# Patient Record
Sex: MALE | Race: WHITE | NOT HISPANIC OR LATINO | Employment: OTHER | URBAN - METROPOLITAN AREA
[De-identification: names, ages, dates, MRNs, and addresses within clinical notes are randomized per-mention and may not be internally consistent; named-entity substitution may affect disease eponyms.]

---

## 2017-01-02 ENCOUNTER — MYC REFILL (OUTPATIENT)
Dept: FAMILY MEDICINE | Facility: CLINIC | Age: 67
End: 2017-01-02

## 2017-01-02 DIAGNOSIS — I10 ESSENTIAL HYPERTENSION WITH GOAL BLOOD PRESSURE LESS THAN 140/90: ICD-10-CM

## 2017-01-02 DIAGNOSIS — I10 BENIGN ESSENTIAL HYPERTENSION: ICD-10-CM

## 2017-01-02 DIAGNOSIS — E78.5 HYPERLIPIDEMIA LDL GOAL <130: ICD-10-CM

## 2017-01-02 RX ORDER — AMLODIPINE BESYLATE 10 MG/1
10 TABLET ORAL DAILY
Qty: 90 TABLET | Refills: 1 | Status: SHIPPED | OUTPATIENT
Start: 2017-01-02 | End: 2017-08-20

## 2017-01-02 RX ORDER — TRIAMTERENE AND HYDROCHLOROTHIAZIDE 37.5; 25 MG/1; MG/1
1 CAPSULE ORAL DAILY
Qty: 90 CAPSULE | Refills: 1 | Status: SHIPPED | OUTPATIENT
Start: 2017-01-02 | End: 2017-06-29

## 2017-01-02 RX ORDER — SIMVASTATIN 40 MG
40 TABLET ORAL AT BEDTIME
Qty: 90 TABLET | Refills: 1 | Status: SHIPPED | OUTPATIENT
Start: 2017-01-02 | End: 2017-06-29

## 2017-01-02 RX ORDER — VALSARTAN 160 MG/1
160 TABLET ORAL DAILY
Qty: 90 TABLET | Refills: 1 | Status: SHIPPED | OUTPATIENT
Start: 2017-01-02 | End: 2017-06-29

## 2017-01-02 NOTE — TELEPHONE ENCOUNTER
Change of pharmacy.  Sent scripts to [facility] at patient's request.   Prescriptions approved per [facility] Refill Protocol.  Candis Mccormick RN

## 2017-01-02 NOTE — TELEPHONE ENCOUNTER
Message from MyChart:  Original authorizing provider: Angus Llanes MD, MD    Yoan Baltazar would like a refill of the following medications:  valsartan (DIOVAN) 160 MG tablet [Angus Llanes MD, MD]  amLODIPine (NORVASC) 10 MG tablet [Angus Llanes MD, MD]  triamterene-hydrochlorothiazide (DYAZIDE) 37.5-25 MG per capsule [Angus Llanes MD, MD]  simvastatin (ZOCOR) 40 MG tablet [Angus Llanes MD, MD]    Preferred pharmacy: [facility] - [place] [address] [facility]    Comment:  Refill through [facility] mail order ([phone])

## 2017-01-04 ENCOUNTER — TRANSFERRED RECORDS (OUTPATIENT)
Dept: HEALTH INFORMATION MANAGEMENT | Facility: CLINIC | Age: 67
End: 2017-01-04

## 2017-01-25 DIAGNOSIS — Z47.1 AFTERCARE FOLLOWING RIGHT KNEE JOINT REPLACEMENT SURGERY: Primary | ICD-10-CM

## 2017-01-25 DIAGNOSIS — Z96.651 AFTERCARE FOLLOWING RIGHT KNEE JOINT REPLACEMENT SURGERY: Primary | ICD-10-CM

## 2017-01-30 ENCOUNTER — OFFICE VISIT (OUTPATIENT)
Dept: ORTHOPEDICS | Facility: CLINIC | Age: 67
End: 2017-01-30

## 2017-01-30 ENCOUNTER — OFFICE VISIT (OUTPATIENT)
Dept: FAMILY MEDICINE | Facility: CLINIC | Age: 67
End: 2017-01-30
Payer: COMMERCIAL

## 2017-01-30 ENCOUNTER — RADIANT APPOINTMENT (OUTPATIENT)
Dept: GENERAL RADIOLOGY | Facility: CLINIC | Age: 67
End: 2017-01-30
Attending: INTERNAL MEDICINE
Payer: COMMERCIAL

## 2017-01-30 VITALS
HEART RATE: 72 BPM | BODY MASS INDEX: 29.47 KG/M2 | WEIGHT: 199 LBS | OXYGEN SATURATION: 97 % | DIASTOLIC BLOOD PRESSURE: 86 MMHG | TEMPERATURE: 97.4 F | SYSTOLIC BLOOD PRESSURE: 138 MMHG | HEIGHT: 69 IN

## 2017-01-30 DIAGNOSIS — R05.9 COUGH: Primary | ICD-10-CM

## 2017-01-30 DIAGNOSIS — J45.20 REACTIVE AIRWAY DISEASE, MILD INTERMITTENT, UNCOMPLICATED: ICD-10-CM

## 2017-01-30 DIAGNOSIS — R05.9 COUGH: ICD-10-CM

## 2017-01-30 DIAGNOSIS — M17.0 PRIMARY OSTEOARTHRITIS OF BOTH KNEES: Primary | ICD-10-CM

## 2017-01-30 DIAGNOSIS — I10 BENIGN ESSENTIAL HYPERTENSION: ICD-10-CM

## 2017-01-30 LAB
ANION GAP SERPL CALCULATED.3IONS-SCNC: 8 MMOL/L (ref 3–14)
BUN SERPL-MCNC: 19 MG/DL (ref 7–30)
CALCIUM SERPL-MCNC: 9.3 MG/DL (ref 8.5–10.1)
CHLORIDE SERPL-SCNC: 108 MMOL/L (ref 94–109)
CO2 SERPL-SCNC: 27 MMOL/L (ref 20–32)
CREAT SERPL-MCNC: 1.15 MG/DL (ref 0.66–1.25)
GFR SERPL CREATININE-BSD FRML MDRD: 64 ML/MIN/1.7M2
GLUCOSE SERPL-MCNC: 116 MG/DL (ref 70–99)
POTASSIUM SERPL-SCNC: 3.7 MMOL/L (ref 3.4–5.3)
SODIUM SERPL-SCNC: 143 MMOL/L (ref 133–144)

## 2017-01-30 PROCEDURE — 80048 BASIC METABOLIC PNL TOTAL CA: CPT | Performed by: INTERNAL MEDICINE

## 2017-01-30 PROCEDURE — 71020 XR CHEST 2 VW: CPT

## 2017-01-30 PROCEDURE — 36415 COLL VENOUS BLD VENIPUNCTURE: CPT | Performed by: INTERNAL MEDICINE

## 2017-01-30 PROCEDURE — 99213 OFFICE O/P EST LOW 20 MIN: CPT | Mod: 25 | Performed by: INTERNAL MEDICINE

## 2017-01-30 PROCEDURE — 90670 PCV13 VACCINE IM: CPT | Performed by: INTERNAL MEDICINE

## 2017-01-30 PROCEDURE — 90471 IMMUNIZATION ADMIN: CPT | Performed by: INTERNAL MEDICINE

## 2017-01-30 RX ORDER — PREDNISONE 20 MG/1
20 TABLET ORAL DAILY
Qty: 5 TABLET | Refills: 0 | Status: SHIPPED | OUTPATIENT
Start: 2017-01-30 | End: 2018-05-18

## 2017-01-30 RX ORDER — ALBUTEROL SULFATE 90 UG/1
2 AEROSOL, METERED RESPIRATORY (INHALATION) EVERY 6 HOURS PRN
Qty: 1 INHALER | Refills: 1 | Status: SHIPPED | OUTPATIENT
Start: 2017-01-30 | End: 2018-05-18

## 2017-01-30 RX ORDER — PREDNISONE 20 MG/1
20 TABLET ORAL 2 TIMES DAILY
Qty: 10 TABLET | Refills: 0 | Status: SHIPPED | OUTPATIENT
Start: 2017-01-30 | End: 2017-01-30

## 2017-01-30 NOTE — MR AVS SNAPSHOT
After Visit Summary   1/30/2017    Yoan Baltazar    MRN: [id number]           Patient Information     Date Of Birth          1950        Visit Information        Provider Department      1/30/2017 9:30 AM Angus Llanes MD [facility]        Today's Diagnoses     Cough    -  1     Reactive airway disease, mild intermittent, uncomplicated         Benign essential hypertension           Care Instructions    (R05) Cough  (primary encounter diagnosis)  Comment: chest x-ray today ordered.  I suspect that you have a viral respiratory infection and this should clear in time.  We will treat acute reactive airway disease with a burst of prednisone and also a trial of albuterol as needed.  OK for Prevnar today and recommend pnuemovax in 6 weeks.   Hold off on zoster vaccine until cough has resolved.  This can be given at any pharmacy.  Plan: XR Chest 2 Views, predniSONE (DELTASONE) 20 MG         tablet, PNEUMOCOCCAL CONJ VACCINE 13 VALENT IM         (PREVNAR 13)            (J45.20) Reactive airway disease, mild intermittent, uncomplicated  Comment: as above   Plan: albuterol (PROAIR HFA/PROVENTIL HFA/VENTOLIN         HFA) 108 (90 BASE) MCG/ACT Inhaler,         PNEUMOCOCCAL CONJ VACCINE 13 VALENT IM (PREVNAR        13)            (I10) Benign essential hypertension  Comment: check basic metabolic panel today   Plan: Basic metabolic panel                    Follow-ups after your visit        Your next 10 appointments already scheduled     Jan 30, 2017  3:30 PM   XR SIX FOOT STANDING EXTREMITIES with UCORTHXR1   [facility] ([facility])    [address] [place] [address] [phone]           Please bring a list of your current medicines to your exam. (Include vitamins, minerals and over-thecounter medicines.) Leave your valuables at home.  Tell your doctor if there is a chance you may be pregnant.  You do not need to do  anything special for this exam.            Jan 30, 2017  3:40 PM   XR KNEE BILATERAL 1/2 VIEWS with [id number]   [facility] ([facility])    [address] [id number] [phone]           Please bring a list of your current medicines to your exam. (Include vitamins, minerals and over-thecounter medicines.) Leave your valuables at home.  Tell your doctor if there is a chance you may be pregnant.  You do not need to do anything special for this exam.            Jan 30, 2017  3:45 PM   (Arrive by 3:30 PM)   RETURN KNEE with Yoan Oates MD   [facility] ([facility])    [address] [address] [phone]              Future tests that were ordered for you today     Open Future Orders        Priority Expected Expires Ordered    XR Chest 2 Views Routine 1/30/2017 1/30/2018 1/30/2017            Who to contact     If you have questions or need follow up information about today's clinic visit or your schedule please contact [facility] directly at [phone].  Normal or non-critical lab and imaging results will be communicated to you by MyChart, letter or phone within 4 business days after the clinic has received the results. If you do not hear from us within 7 days, please contact the clinic through [facility] or phone. If you have a critical or abnormal lab result, we will notify you by phone as soon as possible.  Submit refill requests through [email or web address] or call your pharmacy and they will forward the refill request to us. Please allow 3 business days for your refill to be completed.          Additional Information About Your Visit        [facility] Information     [email or web address] gives you secure access to your electronic health record. If you see a primary care provider, you can also send messages to your care team and make appointments. If you have questions,  "please call your primary care clinic.  If you do not have a primary care provider, please call [phone] and they will assist you.        Care EveryWhere ID     This is your Care EveryWhere ID. This could be used by other organizations to access your [place] medical records  [id number]        Your Vitals Were     Pulse Temperature Height BMI (Body Mass Index) Pulse Oximetry       72 97.4  F (36.3  C) (Tympanic) 5' 9\" (1.753 m) 29.37 kg/m2 97%        Blood Pressure from Last 3 Encounters:   01/30/17 138/86   08/12/16 138/86   08/14/15 138/82    Weight from Last 3 Encounters:   01/30/17 199 lb (90.266 kg)   08/12/16 200 lb (90.719 kg)   08/14/15 197 lb (89.359 kg)              We Performed the Following     Basic metabolic panel     PNEUMOCOCCAL CONJ VACCINE 13 VALENT IM (PREVNAR 13)          Today's Medication Changes          These changes are accurate as of: 1/30/17 10:08 AM.  If you have any questions, ask your nurse or doctor.               Start taking these medicines.        Dose/Directions    albuterol 108 (90 BASE) MCG/ACT Inhaler   Commonly known as:  PROAIR HFA/PROVENTIL HFA/VENTOLIN HFA   Used for:  Reactive airway disease, mild intermittent, uncomplicated   Started by:  Angus Llanes MD        Dose:  2 puff   Inhale 2 puffs into the lungs every 6 hours as needed for shortness of breath / dyspnea or wheezing   Quantity:  1 Inhaler   Refills:  1       predniSONE 20 MG tablet   Commonly known as:  DELTASONE   Used for:  Cough   Started by:  Angus Llanes MD        Dose:  20 mg   Take 1 tablet (20 mg) by mouth 2 times daily   Quantity:  10 tablet   Refills:  0            Where to get your medicines      These medications were sent to [facility] - [place], [place] - [address] [facility]  [address] [facility] [address]     Phone:  [phone]    - albuterol 108 (90 BASE) MCG/ACT Inhaler  - predniSONE 20 MG tablet             Primary Care Provider " Office Phone # Fax #    Angus Llanes [phone]       [facility] [address] [facility] [address]  [facility] [address]        Thank you!     Thank you for choosing [facility]  for your care. Our goal is always to provide you with excellent care. Hearing back from our patients is one way we can continue to improve our services. Please take a few minutes to complete the written survey that you may receive in the mail after your visit with us. Thank you!             Your Updated Medication List - Protect others around you: Learn how to safely use, store and throw away your medicines at www.disposemymeds.org.          This list is accurate as of: 1/30/17 10:08 AM.  Always use your most recent med list.                   Brand Name Dispense Instructions for use    albuterol 108 (90 BASE) MCG/ACT Inhaler    PROAIR HFA/PROVENTIL HFA/VENTOLIN HFA    1 Inhaler    Inhale 2 puffs into the lungs every 6 hours as needed for shortness of breath / dyspnea or wheezing       amLODIPine 10 MG tablet    NORVASC    90 tablet    Take 1 tablet (10 mg) by mouth daily       aspirin 81 MG tablet     30 tablet    Take 1 tablet (81 mg) by mouth daily       DAILY MULTIVITAMIN PO      Take 1 tablet by mouth daily.       GLUCOSAMINE CHONDR COMPLEX PO      Take 1 capsule by mouth 2 times daily. 1500 mg       omeprazole 20 MG tablet     90 tablet    Take 1 tablet (20 mg) by mouth daily Take 30-60 minutes before a meal.       predniSONE 20 MG tablet    DELTASONE    10 tablet    Take 1 tablet (20 mg) by mouth 2 times daily       simvastatin 40 MG tablet    ZOCOR    90 tablet    Take 1 tablet (40 mg) by mouth At Bedtime       tadalafil 10 MG tablet    CIALIS    12 tablet    Take 0.5-1 tablets (5-10 mg) by mouth daily as needed for erectile dysfunction Never use with nitroglycerin, terazosin or doxazosin.       triamterene-hydrochlorothiazide 37.5-25 MG per capsule    DYAZIDE    90 capsule    Take 1 capsule by  mouth daily       valsartan 160 MG tablet    DIOVAN    90 tablet    Take 1 tablet (160 mg) by mouth daily       VITAMIN B COMPLEX PO      Take 1 tablet by mouth daily.

## 2017-01-30 NOTE — PATIENT INSTRUCTIONS
(R05) Cough  (primary encounter diagnosis)  Comment: chest x-ray today ordered.  I suspect that you have a viral respiratory infection and this should clear in time.  We will treat acute reactive airway disease with a burst of prednisone and also a trial of albuterol as needed.  OK for Prevnar today and recommend pnuemovax in 6 weeks.   Hold off on zoster vaccine until cough has resolved.  This can be given at any pharmacy.  Plan: XR Chest 2 Views, predniSONE (DELTASONE) 20 MG         tablet, PNEUMOCOCCAL CONJ VACCINE 13 VALENT IM         (PREVNAR 13)            (J45.20) Reactive airway disease, mild intermittent, uncomplicated  Comment: as above   Plan: albuterol (PROAIR HFA/PROVENTIL HFA/VENTOLIN         HFA) 108 (90 BASE) MCG/ACT Inhaler,         PNEUMOCOCCAL CONJ VACCINE 13 VALENT IM (PREVNAR        13)            (I10) Benign essential hypertension  Comment: check basic metabolic panel today   Plan: Basic metabolic panel

## 2017-01-30 NOTE — LETTER
2017       RE: Carlos Baltazar  [address] [facility] [address]     Dear Colleague,    Thank you for referring your patient, Carlos Baltazar, to the [facility] at [facility]. Please see a copy of my visit note below.    HISTORY OF PRESENT ILLNESS:  Carlos is a year and a half status post right total knee arthroplasty with right ACL retaining design from [employer].  He reports marked improvement of the calf and hamstring discomfort but does note when he arises after seated for a long period of time, he gets some temporary symptoms behind the knee.  On the left side he notes a painless crepitus with deep knee flexion.  He is otherwise very pleased with his bilateral knee arthroplasties.      PHYSICAL EXAMINATION:  He is alert and oriented x3.  His affect is normal.  He walks with a normal gait pattern.  He has 0-120 degrees of flexion on the right and 0-130 on the left.  He has very slight valgus on the left side.  Standing AP x-ray of both lower extremities shows 3 degrees of valgus on the right and 0 degrees on the left.      ASSESSMENT:  Good progress following bilateral total knee arthroplasties.      TREATMENT:  He will progress his activities and continue his exercises and return for followup in 3 years or sooner if necessary.         CARLOS WASHBURN MD          D: 2017 17:19   T: 2017 16:57   MT: FRANKLYN      Name:     CARLOS BALTAZAR   MRN:      [id number]        Account:      [id number]   :      1950           Service Date: 2017      Document: [id number]

## 2017-01-30 NOTE — MR AVS SNAPSHOT
After Visit Summary   1/30/2017    Yoan Baltazar    MRN: [id number]           Patient Information     Date Of Birth          1950        Visit Information        Provider Department      1/30/2017 3:45 PM Yoan Oates MD [facility]        Today's Diagnoses     Primary osteoarthritis of both knees    -  1       Follow-ups after your visit        Who to contact     Please call your clinic at [phone] to:    Ask questions about your health    Make or cancel appointments    Discuss your medicines    Learn about your test results    Speak to your doctor   If you have compliments or concerns about an experience at your clinic, or if you wish to file a complaint, please contact [facility] Patient Relations at [phone] or email us at [email or web address]         Additional Information About Your Visit        MyChart Information     [employer] gives you secure access to your electronic health record. If you see a primary care provider, you can also send messages to your care team and make appointments. If you have questions, please call your primary care clinic.  If you do not have a primary care provider, please call [phone] and they will assist you.      [facility] is an electronic gateway that provides easy, online access to your medical records. With [facility], you can request a clinic appointment, read your test results, renew a prescription or communicate with your care team.     To access your existing account, please contact your [facility] or call [phone] for assistance.        Care EveryWhere ID     This is your Care EveryWhere ID. This could be used by other organizations to access your [place] medical records  [id number]         Blood Pressure from Last 3 Encounters:   01/30/17 138/86   08/12/16 138/86   08/14/15 138/82    Weight from Last 3 Encounters:   01/30/17 90.3 kg (199 lb)   08/12/16  90.7 kg (200 lb)   08/14/15 89.4 kg (197 lb)              Today, you had the following     No orders found for display         Today's Medication Changes          These changes are accurate as of: 1/30/17 11:59 PM.  If you have any questions, ask your nurse or doctor.               Start taking these medicines.        Dose/Directions    albuterol 108 (90 BASE) MCG/ACT Inhaler   Commonly known as:  PROAIR HFA/PROVENTIL HFA/VENTOLIN HFA   Used for:  Reactive airway disease, mild intermittent, uncomplicated   Started by:  Angus Llanes MD        Dose:  2 puff   Inhale 2 puffs into the lungs every 6 hours as needed for shortness of breath / dyspnea or wheezing   Quantity:  1 Inhaler   Refills:  1       predniSONE 20 MG tablet   Commonly known as:  DELTASONE   Used for:  Cough   Started by:  Angus Llanes MD        Dose:  20 mg   Take 1 tablet (20 mg) by mouth daily   Quantity:  5 tablet   Refills:  0            Where to get your medicines      These medications were sent to [facility] [address] [place] [address], [place] [address]  [address] [place] [address], [facility] [address]     Phone:  [phone]     predniSONE 20 MG tablet         [facility] [place] - [address] [place] [address] A  [address] [facility] [address]     Phone:  [phone]     albuterol 108 (90 BASE) MCG/ACT Inhaler                Primary Care Provider Office Phone # Fax #    Angus Llanes [phone]       [facility] [address] [address]        Thank you!     Thank you for choosing [facility]  for your care. Our goal is always to provide you with excellent care. Hearing back from our patients is one way we can continue to improve our services. Please take a few minutes to complete the written survey that you may receive in the mail after your visit with us. Thank  you!             Your Updated Medication List - Protect others around you: Learn how to safely use, store and throw away your medicines at www.disposemymeds.org.          This list is accurate as of: 1/30/17 11:59 PM.  Always use your most recent med list.                   Brand Name Dispense Instructions for use    albuterol 108 (90 BASE) MCG/ACT Inhaler    PROAIR HFA/PROVENTIL HFA/VENTOLIN HFA    1 Inhaler    Inhale 2 puffs into the lungs every 6 hours as needed for shortness of breath / dyspnea or wheezing       amLODIPine 10 MG tablet    NORVASC    90 tablet    Take 1 tablet (10 mg) by mouth daily       aspirin 81 MG tablet     30 tablet    Take 1 tablet (81 mg) by mouth daily       DAILY MULTIVITAMIN PO      Take 1 tablet by mouth daily.       GLUCOSAMINE CHONDR COMPLEX PO      Take 1 capsule by mouth 2 times daily. 1500 mg       omeprazole 20 MG tablet     90 tablet    Take 1 tablet (20 mg) by mouth daily Take 30-60 minutes before a meal.       predniSONE 20 MG tablet    DELTASONE    5 tablet    Take 1 tablet (20 mg) by mouth daily       simvastatin 40 MG tablet    ZOCOR    90 tablet    Take 1 tablet (40 mg) by mouth At Bedtime       tadalafil 10 MG tablet    CIALIS    12 tablet    Take 0.5-1 tablets (5-10 mg) by mouth daily as needed for erectile dysfunction Never use with nitroglycerin, terazosin or doxazosin.       triamterene-hydrochlorothiazide 37.5-25 MG per capsule    DYAZIDE    90 capsule    Take 1 capsule by mouth daily       valsartan 160 MG tablet    DIOVAN    90 tablet    Take 1 tablet (160 mg) by mouth daily       VITAMIN B COMPLEX PO      Take 1 tablet by mouth daily.

## 2017-01-30 NOTE — PROGRESS NOTES
"Chief Complaint   Patient presents with     Cough       Initial /86 mmHg  Pulse 72  Temp(Src) 97.4  F (36.3  C) (Tympanic)  Ht 5' 9\" (1.753 m)  Wt 199 lb (90.266 kg)  BMI 29.37 kg/m2  SpO2 97% Estimated body mass index is 29.37 kg/(m^2) as calculated from the following:    Height as of this encounter: 5' 9\" (1.753 m).    Weight as of this encounter: 199 lb (90.266 kg).  BP completed using cuff size: regular right arm   Debo Gardner- CMA    [facility]  CLINIC PROGRESS NOTE    Subjective:  Upper respiratory infection    Yoan Baltazar presents to the clinic today for a month long cough.  Has had some slight improvement.  He is noticing that the cough is present most days - may be triggered by food.  He is taking omeprazole 20 mg daily.  He is not taking lisinopril.  No sick contacts.  No fevers or chills.  Cough is more dry - non productive.  He notes that has had cough periodically in the early 2000's.      Past medical history, medications, allergies, social history, family history reviewed and updated in [facility] as of 1/30/2017 .    ROS  CONSTITUTIONAL: no fatigue, no unexpected change in weight  SKIN: no worrisome rashes, no worrisome moles, no worrisome lesions  EYES: no acute vision problems or changes  ENT: no ear problems, no mouth problems, no throat problems  RESP: as above   CV: no chest pain, no palpitations, no new or worsening peripheral edema  GI: no nausea, no vomiting, no constipation, no diarrhea  : no frequency, no dysuria, no hematuria  MS: no claudication, no myalgias, no joint aches  PSYCHIATRIC: no changes in mood or affect      Objective:  Vitals  /86 mmHg  Pulse 72  Temp(Src) 97.4  F (36.3  C) (Tympanic)  Ht 5' 9\" (1.753 m)  Wt 199 lb (90.266 kg)  BMI 29.37 kg/m2  SpO2 97%  GEN: Alert Oriented x3 NAD  HEENT: Atraumatic, normocephalic, neck supple, no thyromegaly, negative cervical adenopathy  TM: TM bilaterally pearly and grey with normal light reflex  CV: RRR " no murmurs or rubs  PULM: CTA no wheezes or crackles  ABD: Soft, nontender nondistended, no hepatosplenomegally  SKIN: No visible skin lesion or ulcerations  EXT:  , no edema bilateral lower extremities  NEURO: Gait and station deferred, No focal neurologic deficits  PSYCH: Mood good, affect mood congruent    No results found for this or any previous visit (from the past 24 hour(s)).    Assessment/Plan:  Patient Instructions   (R05) Cough  (primary encounter diagnosis)  Comment: chest x-ray today ordered.  I suspect that you have a viral respiratory infection and this should clear in time.  We will treat acute reactive airway disease with a burst of prednisone and also a trial of albuterol as needed.  OK for Prevnar today and recommend pnuemovax in 6 weeks.   Hold off on zoster vaccine until cough has resolved.  This can be given at any pharmacy.  Plan: XR Chest 2 Views, predniSONE (DELTASONE) 20 MG         tablet, PNEUMOCOCCAL CONJ VACCINE 13 VALENT IM         (PREVNAR 13)            (J45.20) Reactive airway disease, mild intermittent, uncomplicated  Comment: as above   Plan: albuterol (PROAIR HFA/PROVENTIL HFA/VENTOLIN         HFA) 108 (90 BASE) MCG/ACT Inhaler,         PNEUMOCOCCAL CONJ VACCINE 13 VALENT IM (PREVNAR        13)            (I10) Benign essential hypertension  Comment: check basic metabolic panel today   Plan: Basic metabolic panel                  Follow up in 2-4 weeks if not improving.    Disclaimer: This note consists of symbols derived from keyboarding, dictation and/or voice recognition software. As a result, there may be errors in the script that have gone undetected. Please consider this when interpreting information found in this chart.    Angus Llanes MD  [phone]

## 2017-01-30 NOTE — PROGRESS NOTES
Quick Note:    Conor Milton,    I have had the opportunity to review your recent results and an interpretation is as follows:  Your chest x-ray was clear - no acute infiltrate was seen     Sincerely,  Angus Llanes MD  ______

## 2017-01-31 NOTE — PROGRESS NOTES
HISTORY OF PRESENT ILLNESS:  Carlos is a year and a half status post right total knee arthroplasty with right ACL retaining design from [employer].  He reports marked improvement of the calf and hamstring discomfort but does note when he arises after seated for a long period of time, he gets some temporary symptoms behind the knee.  On the left side he notes a painless crepitus with deep knee flexion.  He is otherwise very pleased with his bilateral knee arthroplasties.      PHYSICAL EXAMINATION:  He is alert and oriented x3.  His affect is normal.  He walks with a normal gait pattern.  He has 0-120 degrees of flexion on the right and 0-130 on the left.  He has very slight valgus on the left side.  Standing AP x-ray of both lower extremities shows 3 degrees of valgus on the right and 0 degrees on the left.      ASSESSMENT:  Good progress following bilateral total knee arthroplasties.      TREATMENT:  He will progress his activities and continue his exercises and return for followup in 3 years or sooner if necessary.         CARLOS WASHBURN MD             D: 2017 17:19   T: 2017 16:57   MT: FRANKLYN      Name:     CARLOS STEVENSON   MRN:      [id number]        Account:      [id number]   :      1950           Service Date: 2017      Document: [id number]

## 2017-02-03 NOTE — PROGRESS NOTES
Quick Note:    Conor Milton,    I have had the opportunity to review your recent results and an interpretation is as follows:  Your follow up basic metabolic panel shows stable renal function and electrolytes     Sincerely,  Angus Llanes MD  ______

## 2017-03-02 ENCOUNTER — TELEPHONE (OUTPATIENT)
Dept: FAMILY MEDICINE | Facility: CLINIC | Age: 67
End: 2017-03-02

## 2017-03-02 DIAGNOSIS — E78.5 HYPERLIPIDEMIA LDL GOAL <130: Primary | ICD-10-CM

## 2017-03-10 ENCOUNTER — ALLIED HEALTH/NURSE VISIT (OUTPATIENT)
Dept: NURSING | Facility: CLINIC | Age: 67
End: 2017-03-10
Payer: COMMERCIAL

## 2017-03-10 DIAGNOSIS — Z11.59 NEED FOR HEPATITIS C SCREENING TEST: ICD-10-CM

## 2017-03-10 DIAGNOSIS — Z23 NEED FOR VACCINATION: Primary | ICD-10-CM

## 2017-03-10 DIAGNOSIS — E78.5 HYPERLIPIDEMIA LDL GOAL <130: ICD-10-CM

## 2017-03-10 DIAGNOSIS — I10 ESSENTIAL HYPERTENSION WITH GOAL BLOOD PRESSURE LESS THAN 140/90: ICD-10-CM

## 2017-03-10 LAB
CHOLEST SERPL-MCNC: 159 MG/DL
HCV AB SERPL QL IA: NORMAL
HDLC SERPL-MCNC: 36 MG/DL
LDLC SERPL CALC-MCNC: 87 MG/DL
NONHDLC SERPL-MCNC: 123 MG/DL
POTASSIUM SERPL-SCNC: 3.7 MMOL/L (ref 3.4–5.3)
TRIGL SERPL-MCNC: 178 MG/DL

## 2017-03-10 PROCEDURE — 36415 COLL VENOUS BLD VENIPUNCTURE: CPT | Performed by: INTERNAL MEDICINE

## 2017-03-10 PROCEDURE — 90471 IMMUNIZATION ADMIN: CPT

## 2017-03-10 PROCEDURE — 84132 ASSAY OF SERUM POTASSIUM: CPT | Performed by: INTERNAL MEDICINE

## 2017-03-10 PROCEDURE — 86803 HEPATITIS C AB TEST: CPT | Performed by: INTERNAL MEDICINE

## 2017-03-10 PROCEDURE — 99207 ZZC NO CHARGE NURSE ONLY: CPT | Mod: 25

## 2017-03-10 PROCEDURE — 80061 LIPID PANEL: CPT | Performed by: INTERNAL MEDICINE

## 2017-03-10 PROCEDURE — 90736 HZV VACCINE LIVE SUBQ: CPT

## 2017-03-10 NOTE — MR AVS SNAPSHOT
After Visit Summary   3/10/2017    Yoan Baltazar    MRN: [id number]           Patient Information     Date Of Birth          1950        Visit Information        Provider Department      3/10/2017 9:30 AM CS NURSE [facility]        Today's Diagnoses     Need for vaccination    -  1       Follow-ups after your visit        Your next 10 appointments already scheduled     Mar 10, 2017  9:15 AM CST   LAB with CS LAB   [facility]    [address] [id number] [phone]           Patient must bring picture ID.  Patient should be prepared to give a urine specimen  Please do not eat 10-12 hours before your appointment if you are coming in fasting for labs on lipids, cholesterol, or glucose (sugar).  Pregnant women should follow their Care Team instructions. Water with medications is okay. Do not drink coffee or other fluids.   If you have concerns about taking  your medications, please ask at office or if scheduling via Ostrov[facility], send a message by clicking on Secure Messaging, Message Your Care Team.            Mar 10, 2017  9:30 AM CST   Nurse Only with CS NURSE   [facility]    [address] [facility] [id number] [phone]              Who to contact     If you have questions or need follow up information about today's clinic visit or your schedule please contact [facility] directly at [phone].  Normal or non-critical lab and imaging results will be communicated to you by MyChart, letter or phone within 4 business days after the clinic has received the results. If you do not hear from us within 7 days, please contact the clinic through Beakerhart or phone. If you have a critical or abnormal lab result, we will notify you by phone as soon as possible.  Submit refill requests through Ostrovok or call your pharmacy and they will forward the refill request to us. Please allow 3  business days for your refill to be completed.          Additional Information About Your Visit        Twirl TVhart Information     M[employer] gives you secure access to your electronic health record. If you see a primary care provider, you can also send messages to your care team and make appointments. If you have questions, please call your primary care clinic.  If you do not have a primary care provider, please call [phone] and they will assist you.        Care EveryWhere ID     This is your Care EveryWhere ID. This could be used by other organizations to access your [place] medical records  [id number]         Blood Pressure from Last 3 Encounters:   01/30/17 138/86   08/12/16 138/86   08/14/15 138/82    Weight from Last 3 Encounters:   01/30/17 199 lb (90.3 kg)   08/12/16 200 lb (90.7 kg)   08/14/15 197 lb (89.4 kg)              We Performed the Following     1st  Administration  [[id number]]     ZOSTER VACC LIVE SUBQ NJX [[id number]]        Primary Care Provider Office Phone # Fax #    Angus Llanes [phone]       [facility] [address]  [place] [address]        Thank you!     Thank you for choosing [facility]  for your care. Our goal is always to provide you with excellent care. Hearing back from our patients is one way we can continue to improve our services. Please take a few minutes to complete the written survey that you may receive in the mail after your visit with us. Thank you!             Your Updated Medication List - Protect others around you: Learn how to safely use, store and throw away your medicines at www.disposemymeds.org.          This list is accurate as of: 3/10/17  9:10 AM.  Always use your most recent med list.                   Brand Name Dispense Instructions for use    albuterol 108 (90 BASE) MCG/ACT Inhaler    PROAIR HFA/PROVENTIL HFA/VENTOLIN HFA    1 Inhaler    Inhale 2 puffs into the lungs every 6 hours as needed for shortness  of breath / dyspnea or wheezing       amLODIPine 10 MG tablet    NORVASC    90 tablet    Take 1 tablet (10 mg) by mouth daily       aspirin 81 MG tablet     30 tablet    Take 1 tablet (81 mg) by mouth daily       DAILY MULTIVITAMIN PO      Take 1 tablet by mouth daily.       GLUCOSAMINE CHONDR COMPLEX PO      Take 1 capsule by mouth 2 times daily. 1500 mg       omeprazole 20 MG tablet     90 tablet    Take 1 tablet (20 mg) by mouth daily Take 30-60 minutes before a meal.       predniSONE 20 MG tablet    DELTASONE    5 tablet    Take 1 tablet (20 mg) by mouth daily       simvastatin 40 MG tablet    ZOCOR    90 tablet    Take 1 tablet (40 mg) by mouth At Bedtime       tadalafil 10 MG tablet    CIALIS    12 tablet    Take 0.5-1 tablets (5-10 mg) by mouth daily as needed for erectile dysfunction Never use with nitroglycerin, terazosin or doxazosin.       triamterene-hydrochlorothiazide 37.5-25 MG per capsule    DYAZIDE    90 capsule    Take 1 capsule by mouth daily       valsartan 160 MG tablet    DIOVAN    90 tablet    Take 1 tablet (160 mg) by mouth daily       VITAMIN B COMPLEX PO      Take 1 tablet by mouth daily.

## 2017-03-10 NOTE — NURSING NOTE
Screening Questionnaire for Adult Immunization    Are you sick today?   No   Do you have allergies to medications, food, a vaccine component or latex?   No   Have you ever had a serious reaction after receiving a vaccination?   No   Do you have a long-term health problem with heart disease, lung disease, asthma, kidney disease, metabolic disease (e.g. diabetes), anemia, or other blood disorder?   No   Do you have cancer, leukemia, HIV/AIDS, or any other immune system problem?   No   In the past 3 months, have you taken medications that affect  your immune system, such as prednisone, other steroids, or anticancer drugs; drugs for the treatment of rheumatoid arthritis, Crohn s disease, or psoriasis; or have you had radiation treatments?   No   Have you had a seizure, or a brain or other nervous system problem?   No   During the past year, have you received a transfusion of blood or blood     products, or been given immune (gamma) globulin or antiviral drug?   No   For women: Are you pregnant or is there a chance you could become        pregnant during the next month?   No   Have you received any vaccinations in the past 4 weeks?   No     Immunization questionnaire answers were all negative.      MNVFC doesn't apply on this patient    Per orders of Dr. Llanes, injection of zostavax given by Nakia Ramos. Patient instructed to remain in clinic for 20 minutes afterwards, and to report any adverse reaction to me immediately.       Screening performed by Nakia Ramos on 3/10/2017 at 9:10 AM.

## 2017-03-12 NOTE — PROGRESS NOTES
Conor Milton,    I had the opportunity to review your recent labs and a summary of your labs reads as follows:    Your potassium level returned back to within normal limits  Your hepatitis C level returned negative  Your fasting lipid panel show  - low HDL (good) cholesterol -as your goal is greater than 40  - low LDL (bad) cholesterol as your goal is less than 130  - elevated triglyceride levels      Sincerely,  Angus Llanes MD

## 2017-06-06 ENCOUNTER — TRANSFERRED RECORDS (OUTPATIENT)
Dept: HEALTH INFORMATION MANAGEMENT | Facility: CLINIC | Age: 67
End: 2017-06-06

## 2017-06-29 DIAGNOSIS — I10 ESSENTIAL HYPERTENSION WITH GOAL BLOOD PRESSURE LESS THAN 140/90: ICD-10-CM

## 2017-06-29 DIAGNOSIS — I10 BENIGN ESSENTIAL HYPERTENSION: ICD-10-CM

## 2017-06-29 DIAGNOSIS — E78.5 HYPERLIPIDEMIA LDL GOAL <130: ICD-10-CM

## 2017-06-29 RX ORDER — SIMVASTATIN 40 MG
40 TABLET ORAL AT BEDTIME
Qty: 90 TABLET | Refills: 1 | Status: SHIPPED | OUTPATIENT
Start: 2017-06-29 | End: 2017-12-31

## 2017-06-29 RX ORDER — VALSARTAN 160 MG/1
160 TABLET ORAL DAILY
Qty: 90 TABLET | Refills: 1 | Status: SHIPPED | OUTPATIENT
Start: 2017-06-29 | End: 2017-12-31

## 2017-06-29 RX ORDER — TRIAMTERENE AND HYDROCHLOROTHIAZIDE 37.5; 25 MG/1; MG/1
1 CAPSULE ORAL DAILY
Qty: 90 CAPSULE | Refills: 1 | Status: SHIPPED | OUTPATIENT
Start: 2017-06-29 | End: 2017-11-12

## 2017-08-20 DIAGNOSIS — I10 ESSENTIAL HYPERTENSION WITH GOAL BLOOD PRESSURE LESS THAN 140/90: ICD-10-CM

## 2017-08-21 NOTE — TELEPHONE ENCOUNTER
amLODIPine (NORVASC) 10 MG tablet        Last Written Prescription Date: 1/2/2017  Last Fill Quantity: 90, # refills: 1  Last Office Visit with G, [facility] or [facility] prescribing provider: 1/30/2017       Potassium   Date Value Ref Range Status   03/10/2017 3.7 3.4 - 5.3 mmol/L Final     Creatinine   Date Value Ref Range Status   01/30/2017 1.15 0.66 - 1.25 mg/dL Final     BP Readings from Last 3 Encounters:   01/30/17 138/86   08/12/16 138/86   08/14/15 138/82

## 2017-08-22 RX ORDER — AMLODIPINE BESYLATE 10 MG/1
TABLET ORAL
Qty: 90 TABLET | Refills: 1 | Status: SHIPPED | OUTPATIENT
Start: 2017-08-22 | End: 2018-03-04

## 2017-08-22 NOTE — TELEPHONE ENCOUNTER
Prescription approved per [facility] Refill Protocol.    Sue Fernández RN   [facility]

## 2017-10-20 ENCOUNTER — DOCUMENTATION ONLY (OUTPATIENT)
Dept: LAB | Facility: CLINIC | Age: 67
End: 2017-10-20

## 2017-10-20 DIAGNOSIS — Z00.00 ROUTINE GENERAL MEDICAL EXAMINATION AT A HEALTH CARE FACILITY: Primary | ICD-10-CM

## 2017-10-24 DIAGNOSIS — Z00.00 ROUTINE GENERAL MEDICAL EXAMINATION AT A HEALTH CARE FACILITY: ICD-10-CM

## 2017-10-24 LAB
ALBUMIN SERPL-MCNC: 4 G/DL (ref 3.4–5)
ALP SERPL-CCNC: 94 U/L (ref 40–150)
ALT SERPL W P-5'-P-CCNC: 32 U/L (ref 0–70)
ANION GAP SERPL CALCULATED.3IONS-SCNC: 10 MMOL/L (ref 3–14)
AST SERPL W P-5'-P-CCNC: 17 U/L (ref 0–45)
BILIRUB SERPL-MCNC: 0.4 MG/DL (ref 0.2–1.3)
BUN SERPL-MCNC: 17 MG/DL (ref 7–30)
CALCIUM SERPL-MCNC: 9.1 MG/DL (ref 8.5–10.1)
CHLORIDE SERPL-SCNC: 108 MMOL/L (ref 94–109)
CHOLEST SERPL-MCNC: 139 MG/DL
CO2 SERPL-SCNC: 24 MMOL/L (ref 20–32)
CREAT SERPL-MCNC: 1.27 MG/DL (ref 0.66–1.25)
ERYTHROCYTE [DISTWIDTH] IN BLOOD BY AUTOMATED COUNT: 13.9 % (ref 10–15)
GFR SERPL CREATININE-BSD FRML MDRD: 57 ML/MIN/1.7M2
GLUCOSE SERPL-MCNC: 108 MG/DL (ref 70–99)
HCT VFR BLD AUTO: 42.9 % (ref 40–53)
HDLC SERPL-MCNC: 37 MG/DL
HGB BLD-MCNC: 15 G/DL (ref 13.3–17.7)
LDLC SERPL CALC-MCNC: 68 MG/DL
MCH RBC QN AUTO: 30.4 PG (ref 26.5–33)
MCHC RBC AUTO-ENTMCNC: 35 G/DL (ref 31.5–36.5)
MCV RBC AUTO: 87 FL (ref 78–100)
NONHDLC SERPL-MCNC: 102 MG/DL
PLATELET # BLD AUTO: 244 10E9/L (ref 150–450)
POTASSIUM SERPL-SCNC: 3.7 MMOL/L (ref 3.4–5.3)
PROT SERPL-MCNC: 7.5 G/DL (ref 6.8–8.8)
RBC # BLD AUTO: 4.94 10E12/L (ref 4.4–5.9)
SODIUM SERPL-SCNC: 142 MMOL/L (ref 133–144)
TRIGL SERPL-MCNC: 170 MG/DL
WBC # BLD AUTO: 5.8 10E9/L (ref 4–11)

## 2017-10-24 PROCEDURE — 36415 COLL VENOUS BLD VENIPUNCTURE: CPT | Performed by: NURSE PRACTITIONER

## 2017-10-24 PROCEDURE — 85027 COMPLETE CBC AUTOMATED: CPT | Performed by: NURSE PRACTITIONER

## 2017-10-24 PROCEDURE — 80061 LIPID PANEL: CPT | Performed by: NURSE PRACTITIONER

## 2017-10-24 PROCEDURE — 80053 COMPREHEN METABOLIC PANEL: CPT | Performed by: NURSE PRACTITIONER

## 2017-10-30 ENCOUNTER — OFFICE VISIT (OUTPATIENT)
Dept: FAMILY MEDICINE | Facility: CLINIC | Age: 67
End: 2017-10-30
Payer: COMMERCIAL

## 2017-10-30 VITALS
SYSTOLIC BLOOD PRESSURE: 132 MMHG | DIASTOLIC BLOOD PRESSURE: 78 MMHG | HEART RATE: 72 BPM | HEIGHT: 69 IN | TEMPERATURE: 97.3 F | WEIGHT: 202 LBS | OXYGEN SATURATION: 97 % | BODY MASS INDEX: 29.92 KG/M2

## 2017-10-30 DIAGNOSIS — Z23 NEED FOR VACCINATION: ICD-10-CM

## 2017-10-30 DIAGNOSIS — Z91.81 AT RISK FOR FALLING: ICD-10-CM

## 2017-10-30 DIAGNOSIS — K22.70 BARRETT'S ESOPHAGUS WITHOUT DYSPLASIA: ICD-10-CM

## 2017-10-30 DIAGNOSIS — K21.9 GASTROESOPHAGEAL REFLUX DISEASE WITHOUT ESOPHAGITIS: ICD-10-CM

## 2017-10-30 DIAGNOSIS — Z80.0 FAMILY HISTORY OF PANCREATIC CANCER: ICD-10-CM

## 2017-10-30 DIAGNOSIS — I10 ESSENTIAL HYPERTENSION: ICD-10-CM

## 2017-10-30 DIAGNOSIS — Z23 NEED FOR PROPHYLACTIC VACCINATION AND INOCULATION AGAINST INFLUENZA: ICD-10-CM

## 2017-10-30 DIAGNOSIS — Z00.00 ROUTINE GENERAL MEDICAL EXAMINATION AT A HEALTH CARE FACILITY: Primary | ICD-10-CM

## 2017-10-30 DIAGNOSIS — N18.30 CKD (CHRONIC KIDNEY DISEASE) STAGE 3, GFR 30-59 ML/MIN (H): ICD-10-CM

## 2017-10-30 PROCEDURE — G0439 PPPS, SUBSEQ VISIT: HCPCS | Performed by: INTERNAL MEDICINE

## 2017-10-30 PROCEDURE — G0008 ADMIN INFLUENZA VIRUS VAC: HCPCS | Performed by: INTERNAL MEDICINE

## 2017-10-30 PROCEDURE — G0009 ADMIN PNEUMOCOCCAL VACCINE: HCPCS | Performed by: INTERNAL MEDICINE

## 2017-10-30 PROCEDURE — 90662 IIV NO PRSV INCREASED AG IM: CPT | Performed by: INTERNAL MEDICINE

## 2017-10-30 PROCEDURE — 90732 PPSV23 VACC 2 YRS+ SUBQ/IM: CPT | Performed by: INTERNAL MEDICINE

## 2017-10-30 NOTE — NURSING NOTE
Screening Questionnaire for Adult Immunization    Are you sick today?   No   Do you have allergies to medications, food, a vaccine component or latex?   No   Have you ever had a serious reaction after receiving a vaccination?   No   Do you have a long-term health problem with heart disease, lung disease, asthma, kidney disease, metabolic disease (e.g. diabetes), anemia, or other blood disorder?   No   Do you have cancer, leukemia, HIV/AIDS, or any other immune system problem?   No   In the past 3 months, have you taken medications that affect  your immune system, such as prednisone, other steroids, or anticancer drugs; drugs for the treatment of rheumatoid arthritis, Crohn s disease, or psoriasis; or have you had radiation treatments?   No   Have you had a seizure, or a brain or other nervous system problem?   No   During the past year, have you received a transfusion of blood or blood     products, or been given immune (gamma) globulin or antiviral drug?   No   For women: Are you pregnant or is there a chance you could become        pregnant during the next month?   No   Have you received any vaccinations in the past 4 weeks?   No     Immunization questionnaire answers were all negative.        Per orders of Dr. Llanes, injection of Pneumovax 23 given by Arleen Gruber. Patient instructed to remain in clinic for 15 minutes afterwards, and to report any adverse reaction to me immediately.  Prior to injection verified patient identity using patient's name and date of birth.       Screening performed by Arleen Gruber on 10/30/2017 at 10:42 AM.

## 2017-10-30 NOTE — PROGRESS NOTES
SUBJECTIVE:   Yoan Baltazar is a 66 year old male who presents for Preventive Visit.      Are you in the first 12 months of your Medicare Part B coverage?  Maybe   Vision    L: 20/20  R: 20/25  B:20/20    Healthy Habits:    Do you get at least three servings of calcium containing foods daily (dairy, green leafy vegetables, etc.)? yes    Amount of exercise or daily activities, outside of work: 4-5 day(s) per week    Problems taking medications regularly No    Medication side effects: No    Have you had an eye exam in the past two years? yes    Do you see a dentist twice per year? yes    Do you have sleep apnea, excessive snoring or daytime drowsiness?no    COGNITIVE SCREEN  1) Repeat 3 items (Banana, Sunrise, Chair)    2) Clock draw: NORMAL  3) 3 item recall: Recalls 3 objects  Results: 3 items recalled: COGNITIVE IMPAIRMENT LESS LIKELY    Mini-CogTM Copyright S Yfn. Licensed by the author for use in [facility]; reprinted with permission (zofia@[facility]). All rights reserved.      Low back pain    Has been drinking more fluids and stretching regularly and strengthening.  Occasionally will take ibuprofen twice per day for low back pain and playing more tennis.    Chronic kidney disease   Noted recent elevation of creatinine.      Reviewed and updated as needed this visit by clinical staff         Reviewed and updated as needed this visit by Provider      Social History   Substance Use Topics     Smoking status: Never Smoker     Smokeless tobacco: Never Used     Alcohol use 0.0 oz/week     0 Standard drinks or equivalent per week      Comment: maybe 1 glass of wine a month       The patient does not drink >3 drinks per day nor >7 drinks per week.    Today's PHQ-2 Score:   PHQ-2 ( 1999 Pfizer) 8/12/2016 3/3/2015   Q1: Little interest or pleasure in doing things 0 0   Q2: Feeling down, depressed or hopeless 0 0   PHQ-2 Score 0 0         Do you feel safe in your environment - Yes    Do you have a Health  "Care Directive?: No: Advance care planning reviewed with patient; information given to patient to review.      Current providers sharing in care for this patient include: Patient Care Team:  Angus Llanes MD as PCP - General (Internal Medicine)  Yoan Oates MD as MD (Orthopedics)      Hearing impairment: Yes, possible wax right ear    Ability to successfully perform activities of daily living: Yes, no assistance needed     Fall risk:  Fallen 2 or more times in the past year?: No  Any fall with injury in the past year?: No    Home safety:  none identified careful with ladder & stairs        The following health maintenance items are reviewed in Epic and correct as of today:  Health Maintenance   Topic Date Due     FALL RISK ASSESSMENT  12/03/2016     AORTIC ANEURYSM SCREENING (SYSTEM ASSIGNED)  12/03/2016     ADVANCE DIRECTIVE PLANNING Q5 YRS  07/30/2017     INFLUENZA VACCINE (SYSTEM ASSIGNED)  09/01/2017     PNEUMOCOCCAL (2 of 2 - PPSV23) 01/31/2018     BMP Q6 MOS  04/24/2018     LIPID MONITORING Q6 MO  04/24/2018     CMP Q1 YR  10/24/2018     CBC Q1 YR  10/24/2018     TETANUS IMMUNIZATION (SYSTEM ASSIGNED)  08/29/2022     COLON CANCER SCREEN (SYSTEM ASSIGNED)  12/01/2025     HEPATITIS C SCREENING  Completed     ROS:  Constitutional, HEENT, cardiovascular, pulmonary, GI, , musculoskeletal, neuro, skin, endocrine and psych systems are negative, except as otherwise noted.      OBJECTIVE:   /78 (Cuff Size: Adult Large)  Pulse 72  Temp 97.3  F (36.3  C) (Oral)  Ht 5' 9\" (1.753 m)  Wt 202 lb (91.6 kg)  SpO2 97%  BMI 29.83 kg/m2 Estimated body mass index is 29.83 kg/(m^2) as calculated from the following:    Height as of this encounter: 5' 9\" (1.753 m).    Weight as of this encounter: 202 lb (91.6 kg).  EXAM:   GENERAL: healthy, alert and no distress  EYES: Eyes grossly normal to inspection, PERRL and conjunctivae and sclerae normal  HENT: ear canals and TM's normal, nose and mouth without " ulcers or lesions  NECK: no adenopathy, no asymmetry, masses, or scars and thyroid normal to palpation  RESP: lungs clear to auscultation - no rales, rhonchi or wheezes  CV: regular rate and rhythm, normal S1 S2, no S3 or S4, no murmur, click or rub, no peripheral edema and peripheral pulses strong  ABDOMEN: soft, nontender, no hepatosplenomegaly, no masses and bowel sounds normal  MS: no gross musculoskeletal defects noted, no edema  SKIN: no suspicious lesions or rashes  NEURO: Normal strength and tone, mentation intact and speech normal  PSYCH: mentation appears normal, affect normal/bright    ASSESSMENT / PLAN:   (Z00.00) Routine general medical examination at a health care facility  (primary encounter diagnosis)  Comment: For routine exam, we reviewed labs as ordered, cholesterol, diabetes mellitus check, liver function, renal function, PSA.  We will also update vaccination history.  Plan:     (Z23) Need for prophylactic vaccination and inoculation against influenza  Comment: flu shot given  Plan:     (I10) Essential hypertension  Comment: blood pressure is stable on current blood pressure medications   Plan: US Renal Complete, FLU VACCINE, INCREASED         ANTIGEN, PRESV FREE            (N18.3) CKD (chronic kidney disease) stage 3, GFR 30-59 ml/min  Comment: We will request a renal ultrasound -[facility] phone #[phone] and continue current blood pressure medications  I would advise holding ibuprofen and all NSAIDs, but treat pain with tylenol as needed   Plan: US Renal Complete, FLU VACCINE, INCREASED         ANTIGEN, PRESV FREE            (K21.9) Gastroesophageal reflux disease without esophagitis  Comment: OK to use omeprazole daily  Plan:     (K22.70) Boogie's esophagus without dysplasia  Comment: as above - follow up upper endoscopy in January  Plan:            End of Life Planning:  Patient currently has an advanced directive: Yes.  Practitioner is supportive of  "decision.    COUNSELING:        Estimated body mass index is 29.39 kg/(m^2) as calculated from the following:    Height as of 1/30/17: 5' 9\" (1.753 m).    Weight as of 1/30/17: 199 lb (90.3 kg).     reports that he has never smoked. He has never used smokeless tobacco.        Appropriate preventive services were discussed with this patient, including applicable screening as appropriate for cardiovascular disease, diabetes, osteopenia/osteoporosis, and glaucoma.  As appropriate for age/gender, discussed screening for colorectal cancer, prostate cancer, breast cancer, and cervical cancer. Checklist reviewing preventive services available has been given to the patient.    Reviewed patients plan of care and provided an AVS. The Basic Care Plan (routine screening as documented in Health Maintenance) for Yoan meets the Care Plan requirement. This Care Plan has been established and reviewed with the Patient.    Counseling Resources:  ATP IV Guidelines  Pooled Cohorts Equation Calculator  Breast Cancer Risk Calculator  FRAX Risk Assessment  ICSI Preventive Guidelines  Dietary Guidelines for Americans, 2010  USDA's MyPlate  ASA Prophylaxis  Lung CA Screening    Angus Llanes MD, MD  [facility]  "

## 2017-10-30 NOTE — MR AVS SNAPSHOT
After Visit Summary   10/30/2017    Yoan Baltazar    MRN: [id number]           Patient Information     Date Of Birth          1950        Visit Information        Provider Department      10/30/2017 8:30 AM Angus Llanes MD [facility]        Today's Diagnoses     Routine general medical examination at a health care facility    -  1    At risk for falling        Need for prophylactic vaccination and inoculation against influenza        Essential hypertension        CKD (chronic kidney disease) stage 3, GFR 30-59 ml/min        Gastroesophageal reflux disease without esophagitis        Boogie's esophagus without dysplasia          Care Instructions      Preventive Health Recommendations:       Male Ages 65 and over    Yearly exam:             See your health care provider every year in order to  o   Review health changes.   o   Discuss preventive care.    o   Review your medicines if your doctor has prescribed any.    Talk with your health care provider about whether you should have a test to screen for prostate cancer (PSA).    Every 3 years, have a diabetes test (fasting glucose). If you are at risk for diabetes, you should have this test more often.    Every 5 years, have a cholesterol test. Have this test more often if you are at risk for high cholesterol or heart disease.     Every 10 years, have a colonoscopy. Or, have a yearly FIT test (stool test). These exams will check for colon cancer.    Talk to with your health care provider about screening for Abdominal Aortic Aneurysm if you have a family history of AAA or have a history of smoking.  Shots:     Get a flu shot each year.     Get a tetanus shot every 10 years.     Talk to your doctor about your pneumonia vaccines. There are now two you should receive - Pneumovax (PPSV 23) and Prevnar (PCV 13).    Talk to your doctor about a shingles vaccine.     Talk to your doctor about the hepatitis B vaccine.  Nutrition:     Eat  at least 5 servings of fruits and vegetables each day.     Eat whole-grain bread, whole-wheat pasta and brown rice instead of white grains and rice.     Talk to your doctor about Calcium and Vitamin D.   Lifestyle    Exercise for at least 150 minutes a week (30 minutes a day, 5 days a week). This will help you control your weight and prevent disease.     Limit alcohol to one drink per day.     No smoking.     Wear sunscreen to prevent skin cancer.     See your dentist every six months for an exam and cleaning.     See your eye doctor every 1 to 2 years to screen for conditions such as glaucoma, macular degeneration and cataracts.    (Z00.00) Routine general medical examination at a health care facility  (primary encounter diagnosis)  Comment: For routine exam, we reviewed labs as ordered, cholesterol, diabetes mellitus check, liver function, renal function, PSA.  We will also update vaccination history.  Plan:     (Z23) Need for prophylactic vaccination and inoculation against influenza  Comment: flu shot given  Plan:     (I10) Essential hypertension  Comment: blood pressure is stable on current blood pressure medications   Plan: US Renal Complete, FLU VACCINE, INCREASED         ANTIGEN, PRESV FREE            (N18.3) CKD (chronic kidney disease) stage 3, GFR 30-59 ml/min  Comment: We will request a renal ultrasound -[facility] phone #[phone] and continue current blood pressure medications  I would advise holding ibuprofen and all NSAIDs, but treat pain with tylenol as needed   Plan: US Renal Complete, FLU VACCINE, INCREASED         ANTIGEN, PRESV FREE            (K21.9) Gastroesophageal reflux disease without esophagitis  Comment: OK to use omeprazole daily  Plan:     (K22.70) Boogie's esophagus without dysplasia  Comment: as above - follow up upper endoscopy in January  Plan:              Follow-ups after your visit        Future tests that were ordered for you today     Open Future Orders         "Priority Expected Expires Ordered    US Renal Complete Routine  10/30/2018 10/30/2017            Who to contact     If you have questions or need follow up information about today's clinic visit or your schedule please contact [facility] directly at [phone].  Normal or non-critical lab and imaging results will be communicated to you by MyChart, letter or phone within 4 business days after the clinic has received the results. If you do not hear from us within 7 days, please contact the clinic through [email or web address] or phone. If you have a critical or abnormal lab result, we will notify you by phone as soon as possible.  Submit refill requests through [employer] or call your pharmacy and they will forward the refill request to us. Please allow 3 business days for your refill to be completed.          Additional Information About Your Visit        [email or web address] Information     [employer] gives you secure access to your electronic health record. If you see a primary care provider, you can also send messages to your care team and make appointments. If you have questions, please call your primary care clinic.  If you do not have a primary care provider, please call [phone] and they will assist you.        Care EveryWhere ID     This is your Care EveryWhere ID. This could be used by other organizations to access your [facility] medical records  [id number]        Your Vitals Were     Pulse Temperature Height Pulse Oximetry BMI (Body Mass Index)       72 97.3  F (36.3  C) (Oral) 5' 9\" (1.753 m) 97% 29.83 kg/m2        Blood Pressure from Last 3 Encounters:   10/30/17 132/78   01/30/17 138/86   08/12/16 138/86    Weight from Last 3 Encounters:   10/30/17 202 lb (91.6 kg)   01/30/17 199 lb (90.3 kg)   08/12/16 200 lb (90.7 kg)              We Performed the Following     FLU VACCINE, INCREASED ANTIGEN, PRESV FREE        Primary Care Provider Office Phone # Fax #    Angus Llanes [phone]       " [address]  [place] [address]        Equal Access to Services     ZOILA CARROLL : Hadii aad ku hadyvetteo Soibrahimaali, waaxda luqadaha, qaybta kabelleda barbararobelsandra, luzmaria pretty isaiahayden hirschmaudelester amos. So [facility] [phone].    ATENCIÓN: Si habla español, tiene a lee disposición servicios gratuitos de asistencia lingüística. Llame al [phone].    We comply with applicable federal civil rights laws and [place] laws. We do not discriminate on the basis of race, color, national origin, age, disability, sex, sexual orientation, or gender identity.            Thank you!     Thank you for choosing [facility]  for your care. Our goal is always to provide you with excellent care. Hearing back from our patients is one way we can continue to improve our services. Please take a few minutes to complete the written survey that you may receive in the mail after your visit with us. Thank you!             Your Updated Medication List - Protect others around you: Learn how to safely use, store and throw away your medicines at www.disposemymeds.org.          This list is accurate as of: 10/30/17  9:09 AM.  Always use your most recent med list.                   Brand Name Dispense Instructions for use Diagnosis    albuterol 108 (90 BASE) MCG/ACT Inhaler    PROAIR HFA/PROVENTIL HFA/VENTOLIN HFA    1 Inhaler    Inhale 2 puffs into the lungs every 6 hours as needed for shortness of breath / dyspnea or wheezing    Reactive airway disease, mild intermittent, uncomplicated       amLODIPine 10 MG tablet    NORVASC    90 tablet    TAKE ONE TABLET BY MOUTH DAILY    Essential hypertension with goal blood pressure less than 140/90       aspirin 81 MG tablet     30 tablet    Take 1 tablet (81 mg) by mouth daily        DAILY MULTIVITAMIN PO      Take 1 tablet by mouth daily.        GLUCOSAMINE CHONDR COMPLEX PO      Take 1 capsule by mouth 2 times daily. 1500 mg        omeprazole 20 MG tablet     90 tablet    Take 1  tablet (20 mg) by mouth daily Take 30-60 minutes before a meal.    GERD (gastroesophageal reflux disease)       predniSONE 20 MG tablet    DELTASONE    5 tablet    Take 1 tablet (20 mg) by mouth daily    Cough       simvastatin 40 MG tablet    ZOCOR    90 tablet    Take 1 tablet (40 mg) by mouth At Bedtime    Hyperlipidemia LDL goal <130       tadalafil 10 MG tablet    CIALIS    12 tablet    Take 0.5-1 tablets (5-10 mg) by mouth daily as needed for erectile dysfunction Never use with nitroglycerin, terazosin or doxazosin.    Erectile dysfunction, unspecified erectile dysfunction type       triamterene-hydrochlorothiazide 37.5-25 MG per capsule    DYAZIDE    90 capsule    Take 1 capsule by mouth daily    Essential hypertension with goal blood pressure less than 140/90       valsartan 160 MG tablet    DIOVAN    90 tablet    Take 1 tablet (160 mg) by mouth daily    Benign essential hypertension       VITAMIN B COMPLEX PO      Take 1 tablet by mouth daily.

## 2017-10-30 NOTE — PROGRESS NOTES
Conor Milton,    I had the opportunity to review your recent labs and a summary of your labs reads as follows:    Your complete blood counts show no sign of anemia, normal white blood cell count and platelets.  Your comprehensive metabolic panel showed slightly impaired renal function, normal liver function, and stable fasting blood glucose indicating no evidence of diabetes mellitus.  Your fasting lipid panel show  - low HDL (good) cholesterol -as your goal is greater than 40  - low LDL (bad) cholesterol as your goal is less than 130  - stable triglyceride levels    We can review your labs in more detail tomorrow      Sincerely,  Angus Llanes MD

## 2017-10-30 NOTE — PROGRESS NOTES
Injectable Influenza Immunization Documentation    1.  Is the person to be vaccinated sick today?   No    2. Does the person to be vaccinated have an allergy to a component   of the vaccine?   No  Egg Allergy Algorithm Link    3. Has the person to be vaccinated ever had a serious reaction   to influenza vaccine in the past?   No    4. Has the person to be vaccinated ever had Guillain-Barré syndrome?   No    Form completed by patient  Arleen Gruber MA  Prior to injection verified patient identity using patient's name and date of birth.

## 2017-10-30 NOTE — PATIENT INSTRUCTIONS
Preventive Health Recommendations:       Male Ages 65 and over    Yearly exam:             See your health care provider every year in order to  o   Review health changes.   o   Discuss preventive care.    o   Review your medicines if your doctor has prescribed any.    Talk with your health care provider about whether you should have a test to screen for prostate cancer (PSA).    Every 3 years, have a diabetes test (fasting glucose). If you are at risk for diabetes, you should have this test more often.    Every 5 years, have a cholesterol test. Have this test more often if you are at risk for high cholesterol or heart disease.     Every 10 years, have a colonoscopy. Or, have a yearly FIT test (stool test). These exams will check for colon cancer.    Talk to with your health care provider about screening for Abdominal Aortic Aneurysm if you have a family history of AAA or have a history of smoking.  Shots:     Get a flu shot each year.     Get a tetanus shot every 10 years.     Talk to your doctor about your pneumonia vaccines. There are now two you should receive - Pneumovax (PPSV 23) and Prevnar (PCV 13).    Talk to your doctor about a shingles vaccine.     Talk to your doctor about the hepatitis B vaccine.  Nutrition:     Eat at least 5 servings of fruits and vegetables each day.     Eat whole-grain bread, whole-wheat pasta and brown rice instead of white grains and rice.     Talk to your doctor about Calcium and Vitamin D.   Lifestyle    Exercise for at least 150 minutes a week (30 minutes a day, 5 days a week). This will help you control your weight and prevent disease.     Limit alcohol to one drink per day.     No smoking.     Wear sunscreen to prevent skin cancer.     See your dentist every six months for an exam and cleaning.     See your eye doctor every 1 to 2 years to screen for conditions such as glaucoma, macular degeneration and cataracts.    (Z00.00) Routine general medical examination at a health  care facility  (primary encounter diagnosis)  Comment: For routine exam, we reviewed labs as ordered, cholesterol, diabetes mellitus check, liver function, renal function, PSA.  We will also update vaccination history.  Plan:     (Z23) Need for prophylactic vaccination and inoculation against influenza  Comment: flu shot given  Plan:     (I10) Essential hypertension  Comment: blood pressure is stable on current blood pressure medications   Plan: US Renal Complete, FLU VACCINE, INCREASED         ANTIGEN, PRESV FREE            (N18.3) CKD (chronic kidney disease) stage 3, GFR 30-59 ml/min  Comment: We will request a renal ultrasound -[facility] phone #[phone] and continue current blood pressure medications  I would advise holding ibuprofen and all NSAIDs, but treat pain with tylenol as needed   Plan: US Renal Complete, FLU VACCINE, INCREASED         ANTIGEN, PRESV FREE            (K21.9) Gastroesophageal reflux disease without esophagitis  Comment: OK to use omeprazole daily  Plan:     (K22.70) Boogie's esophagus without dysplasia  Comment: as above - follow up upper endoscopy in January  Plan:

## 2017-10-30 NOTE — NURSING NOTE
"Chief Complaint   Patient presents with     Wellness Visit        Initial /86 (Cuff Size: Adult Large)  Pulse 72  Temp 97.3  F (36.3  C) (Oral)  Ht 5' 9\" (1.753 m)  Wt 202 lb (91.6 kg)  SpO2 97%  BMI 29.83 kg/m2 Estimated body mass index is 29.83 kg/(m^2) as calculated from the following:    Height as of this encounter: 5' 9\" (1.753 m).    Weight as of this encounter: 202 lb (91.6 kg)..    BP completed using cuff size: regular  MEDICATIONS REVIEWED  SOCIAL AND FAMILY HX REVIEWED  Janette Wade CMA  "

## 2017-11-01 ENCOUNTER — HOSPITAL ENCOUNTER (OUTPATIENT)
Dept: ULTRASOUND IMAGING | Facility: CLINIC | Age: 67
Discharge: HOME | End: 2017-11-01
Attending: INTERNAL MEDICINE | Admitting: INTERNAL MEDICINE
Payer: MEDICARE

## 2017-11-01 DIAGNOSIS — N18.30 CKD (CHRONIC KIDNEY DISEASE) STAGE 3, GFR 30-59 ML/MIN (H): ICD-10-CM

## 2017-11-01 DIAGNOSIS — I10 ESSENTIAL HYPERTENSION: ICD-10-CM

## 2017-11-01 PROCEDURE — 76770 US EXAM ABDO BACK WALL COMP: CPT

## 2017-11-02 ENCOUNTER — TELEPHONE (OUTPATIENT)
Dept: FAMILY MEDICINE | Facility: CLINIC | Age: 67
End: 2017-11-02

## 2017-11-02 DIAGNOSIS — N28.1 RENAL CYST: Primary | ICD-10-CM

## 2017-11-03 NOTE — PROGRESS NOTES
Conor Milton,    I have had the opportunity to review your recent results and an interpretation is as follows:  Your renal ultrasound shows no acute concerning finding and evidence of two small cysts in the left kidney which we see often and are usually of little clinical concern - we can repeat the ultrasound next year to follow them.     Sincerely,  Angus Llanes MD

## 2017-11-12 DIAGNOSIS — I10 ESSENTIAL HYPERTENSION WITH GOAL BLOOD PRESSURE LESS THAN 140/90: ICD-10-CM

## 2017-11-14 RX ORDER — TRIAMTERENE AND HYDROCHLOROTHIAZIDE 37.5; 25 MG/1; MG/1
CAPSULE ORAL
Qty: 90 CAPSULE | Refills: 1 | Status: SHIPPED | OUTPATIENT
Start: 2017-11-14 | End: 2018-07-29

## 2017-12-08 ENCOUNTER — OFFICE VISIT (OUTPATIENT)
Dept: ONCOLOGY | Facility: CLINIC | Age: 67
End: 2017-12-08
Attending: GENETIC COUNSELOR, MS
Payer: MEDICARE

## 2017-12-08 DIAGNOSIS — Z80.0 FAMILY HISTORY OF PANCREATIC CANCER: Primary | ICD-10-CM

## 2017-12-08 PROCEDURE — 96040 ZZH GENETIC COUNSELING, EACH 30 MINUTES: CPT | Mod: ZF | Performed by: GENETIC COUNSELOR, MS

## 2017-12-08 NOTE — LETTER
"12/8/2017       RE: Yoan Baltazar  [address]  [place] [address]     Dear Colleague,    Thank you for referring your patient, Yoan Baltazar, to the [facility]. Please see a copy of my visit note below.    12/8/2017    Referring Provider: Angus Llanes MD    Presenting Information: I met with Yoan Baltazar today for genetic counseling at the Cancer Risk Management Program at the [facility] to discuss his family history of pancreatic cancer.      Personal History:  Yoan is a 67 year old male. He has not had a cancer diagnosis himself. He does have a personal history of Boogie's esophagus, for which he takes omeprazole and has upper endoscopies. Dysplasia has not been noted. He has had two colonoscopies, one in 2010 which showed two hyperplastic rectal polyps. There were \"several, small\" rectal polyps noted on the colonoscopy report, but no exact number was given. He then had another colonoscopy in 2015 that demonstrated an 8mm tubular adenoma of the ascending colon. A 4mm transverse colon polyp and a 5 mm sigmoid polyp were also noted, but were not analyzed by pathology. Followup was recommended in 3 years.     Yoan has also had a renal ultrasound, and two cysts were found on his left kidney. Per his note, Dr. Llanes did not feel these cysts were concerning. It is also noted in his chart that Yoan has \"kidney disease\", but Yoan was not aware of this. He should discuss this with Dr. Llanes. Yoan sees a dermatologist in [place], and has not had any concerning skin findings or pre-cancerous lesions. Yoan reports that he does not smoke, describes his alcohol use as moderate, and also reports exposures related to his work as a . 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 Due to this, he had some radioactive isotope testing at [place]. He reports these tests all came back normal.     Family History: (Please see scanned " pedigree for detailed family history information)    Sister passed away at age 69 from pancreatic cancer, diagnosed at 68 or 69. She did drink alcohol, but was not a heavy drinker. She did not smoke or have diabetes.     Yoan's parents passed away in their 80s; neither had cancer.    No other known family history of cancer.    His maternal ethnicity is [place]. His paternal ethnicity is [facility].  Ashkenazi Faith ancestry was denied. No reported consanguinity.    Discussion:    Based upon his personal and family history, Yoan is at low risk for a hereditary cancer syndrome.     We discussed the features commonly associated with hereditary cancer syndromes, and a handout regarding this was provided to Yoan today.  As discussed in our session, his family history is absent for the following features typically seen in high risk families:     Several people with the same or related types of cancer    Cancers diagnosed at a young age (before age 50)    Individuals with more than one primary cancer    Multiple generations of the family affected with cancer     Because of the absence of these features, it is unlikely that there is a currently identifiable hereditary cancer syndrome present in Yoan's family.      We discussed the importance for Yoan to contact me if his personal or family history changes. This may modify our assessment regarding risk for a hereditary cancer syndrome and/or genetic testing options.     Screening recommendations based upon personal/family history:    Yoan's risk (and his siblings' risk) for pancreatic cancer is likely somewhat higher than individuals with no family history of pancreatic cancer, but the family history is not strong enough to recommend additional screening at this time.     Continue with colonoscopies, as directed by his physicians. He should keep an eye on number of polyps found, type, and size. We discussed that he should let me know if he has more polyps identified, or  if he has larger (>10mm) or advanced polyps identified.    Continue to follow up with his physicians as needed regarding Boogie's esophagus. He is aware that having this condition puts him at higher risk for esophageal cancer.    Other population cancer screening, such as recommendations by the American Cancer Society, are also appropriate for Yoan and his family.  These screening recommendations may change if there are changes to Yoan's personal and/or family history.  Final screening recommendations should be made by each individual's managing physician.    Plan:  1) Yoan agreed with my assessment and elected to have no genetic testing at this time.   2) Information regarding recommended screening, based upon the family history, was reviewed for Yoan.  3) Yoan will contact me if his family or personal history changes. My contact information was provided.     Face to face time: 25 mins    Malika Neville MS, [facility]  Certified Genetic Counselor  P. [phone]  F. [phone]

## 2017-12-08 NOTE — PROGRESS NOTES
"12/8/2017    Referring Provider: Angus Llanes MD    Presenting Information: I met with Yoan Baltazar today for genetic counseling at the Cancer Risk Management Program at the [facility] to discuss his family history of pancreatic cancer.      Personal History:  Yoan is a 67 year old male. He has not had a cancer diagnosis himself. He does have a personal history of Boogie's esophagus, for which he takes omeprazole and has upper endoscopies. Dysplasia has not been noted. He has had two colonoscopies, one in 2010 which showed two hyperplastic rectal polyps. There were \"several, small\" rectal polyps noted on the colonoscopy report, but no exact number was given. He then had another colonoscopy in 2015 that demonstrated an 8mm tubular adenoma of the ascending colon. A 4mm transverse colon polyp and a 5 mm sigmoid polyp were also noted, but were not analyzed by pathology. Followup was recommended in 3 years.     Yoan has also had a renal ultrasound, and two cysts were found on his left kidney. Per his note, Dr. Llanes did not feel these cysts were concerning. It is also noted in his chart that Yoan has \"kidney disease\", but Yoan was not aware of this. He should discuss this with Dr. Llanes. Yoan sees a dermatologist in [place], and has not had any concerning skin findings or pre-cancerous lesions. Yoan reports that he does not smoke, describes his alcohol use as moderate, and also reports exposures related to his work as a . 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 Due to this, he had some radioactive isotope testing at [place]. He reports these tests all came back normal.     Family History: (Please see scanned pedigree for detailed family history information)    Sister passed away at age 69 from pancreatic cancer, diagnosed at 68 or 69. She did drink alcohol, but was not a heavy drinker. She did not smoke or have diabetes.     Yoan's parents passed " away in their 80s; neither had cancer.    No other known family history of cancer.    His maternal ethnicity is Spanish. His paternal ethnicity is [facility].  Ashkenazi Sabianist ancestry was denied. No reported consanguinity.    Discussion:    Based upon his personal and family history, Yoan is at low risk for a hereditary cancer syndrome.     We discussed the features commonly associated with hereditary cancer syndromes, and a handout regarding this was provided to Yoan today.  As discussed in our session, his family history is absent for the following features typically seen in high risk families:     Several people with the same or related types of cancer    Cancers diagnosed at a young age (before age 50)    Individuals with more than one primary cancer    Multiple generations of the family affected with cancer     Because of the absence of these features, it is unlikely that there is a currently identifiable hereditary cancer syndrome present in Yoan's family.      We discussed the importance for Yoan to contact me if his personal or family history changes. This may modify our assessment regarding risk for a hereditary cancer syndrome and/or genetic testing options.     Screening recommendations based upon personal/family history:    Yoan's risk (and his siblings' risk) for pancreatic cancer is likely somewhat higher than individuals with no family history of pancreatic cancer, but the family history is not strong enough to recommend additional screening at this time.     Continue with colonoscopies, as directed by his physicians. He should keep an eye on number of polyps found, type, and size. We discussed that he should let me know if he has more polyps identified, or if he has larger (>10mm) or advanced polyps identified.    Continue to follow up with his physicians as needed regarding Boogie's esophagus. He is aware that having this condition puts him at higher risk for esophageal cancer.    Other  population cancer screening, such as recommendations by the American Cancer Society, are also appropriate for Yoan and his family.  These screening recommendations may change if there are changes to Yoan's personal and/or family history.  Final screening recommendations should be made by each individual's managing physician.    Plan:  1) Yoan agreed with my assessment and elected to have no genetic testing at this time.   2) Information regarding recommended screening, based upon the family history, was reviewed for Yoan.  3) Yoan will contact me if his family or personal history changes. My contact information was provided.     Face to face time: 25 mins    Malika Neville MS, [facility]  Certified Genetic Counselor  P. [phone]  F. [phone]

## 2017-12-08 NOTE — LETTER
"    Cancer Risk Management  Program Locations    [facility]  [facility]  Mailing Address  Cancer Risk Management Program  [facility]  [address]  [place] [address]    New patient appointments  [phone]  December 8, 2017    Yoan Baltazar  [address] [address]      Dear Yoan,    It was a pleasure meeting with you at the [facility] on 12/8/2017.  Here is a copy of the progress note from your recent genetic counseling visit to the Cancer Risk Management Program.  If you have any additional questions, please feel free to call.    Referring Provider: Angus Llanes MD    Presenting Information: I met with Yoan Baltazar today for genetic counseling at the Cancer Risk Management Program at the [facility] to discuss his family history of pancreatic cancer.      Personal History:  Yoan is a 67 year old male. He has not had a cancer diagnosis himself. He does have a personal history of Boogie's esophagus, for which he takes omeprazole and has upper endoscopies. Dysplasia has not been noted. He has had two colonoscopies, one in 2010 which showed two hyperplastic rectal polyps. There were \"several, small\" rectal polyps noted on the colonoscopy report, but no exact number was given. He then had another colonoscopy in 2015 that demonstrated an 8mm tubular adenoma of the ascending colon. A 4mm transverse colon polyp and a 5 mm sigmoid polyp were also noted, but were not analyzed by pathology. Followup was recommended in 3 years.     Yoan has also had a renal ultrasound, and two cysts were found on his left kidney. Per his note, Dr. Llanes did not feel these cysts were concerning. It is also noted in his chart that Yoan has \"kidney disease\", but Yoan was not aware of this. He should " discuss this with Dr. Llanes. Yoan sees a dermatologist in [place], and has not had any concerning skin findings or pre-cancerous lesions. Yoan reports that he does not smoke, describes his alcohol use as moderate, and also reports exposures related to his work as a . 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 Due to this, he had some radioactive isotope testing at [place]. He reports these tests all came back normal.     Family History: (Please see scanned pedigree for detailed family history information)    Sister passed away at age 69 from pancreatic cancer, diagnosed at 68 or 69. She did drink alcohol, but was not a heavy drinker. She did not smoke or have diabetes.     Yoan's parents passed away in their 80s; neither had cancer.    No other known family history of cancer.    His maternal ethnicity is [place]. His paternal ethnicity is [facility].  Ashkenazi Taoist ancestry was denied. No reported consanguinity.    Discussion:    Based upon his personal and family history, Yoan is at low risk for a hereditary cancer syndrome.     We discussed the features commonly associated with hereditary cancer syndromes, and a handout regarding this was provided to Yoan today.  As discussed in our session, his family history is absent for the following features typically seen in high risk families:     Several people with the same or related types of cancer    Cancers diagnosed at a young age (before age 50)    Individuals with more than one primary cancer    Multiple generations of the family affected with cancer    Because of the absence of these features, it is unlikely that there is a currently identifiable hereditary cancer syndrome present in Yoan's family.      We discussed the importance for Yoan to contact me if his personal or family history changes. This may modify our assessment regarding risk for a hereditary cancer syndrome and/or genetic testing options.      Screening recommendations based upon personal/family history:    Yoan's risk (and his siblings' risk) for pancreatic cancer is likely somewhat higher than individuals with no family history of pancreatic cancer, but the family history is not strong enough to recommend additional screening at this time.     Continue with colonoscopies, as directed by his physicians. He should keep an eye on number of polyps found, type, and size. We discussed that he should let me know if he has more polyps identified, or if he has larger (>10mm) or advanced polyps identified.    Continue to follow up with his physicians as needed regarding Boogie's esophagus. He is aware that having this condition puts him at higher risk for esophageal cancer.    Other population cancer screening, such as recommendations by the American Cancer Society, are also appropriate for Yoan and his family.  These screening recommendations may change if there are changes to Yoan's personal and/or family history.  Final screening recommendations should be made by each individual's managing physician.    Plan:  1) Yoan agreed with my assessment and elected to have no genetic testing at this time.   2) Information regarding recommended screening, based upon the family history, was reviewed for Yoan.  3) Yoan will contact me if his family or personal history changes. My contact information was provided.     Face to face time: 25 mins    Malika Neville MS, [facility]  Certified Genetic Counselor  P. [phone]  F. [phone]

## 2017-12-08 NOTE — MR AVS SNAPSHOT
After Visit Summary   12/8/2017    Yoan Baltazar    MRN: [id number]           Patient Information     Date Of Birth          1950        Visit Information        Provider Department      12/8/2017 10:15 AM Malika Neville GC;  [address] [facility]        Today's Diagnoses     Family history of pancreatic cancer    -  1       Follow-ups after your visit        Who to contact     If you have questions or need follow up information about today's clinic visit or your schedule please contact [facility] directly at [phone].  Normal or non-critical lab and imaging results will be communicated to you by [employer], letter or phone within 4 business days after the clinic has received the results. If you do not hear from us within 7 days, please contact the clinic through icomplyt or phone. If you have a critical or abnormal lab result, we will notify you by phone as soon as possible.  Submit refill requests through [employer] or call your pharmacy and they will forward the refill request to us. Please allow 3 business days for your refill to be completed.          Additional Information About Your Visit        MyChart Information     [employer] gives you secure access to your electronic health record. If you see a primary care provider, you can also send messages to your care team and make appointments. If you have questions, please call your primary care clinic.  If you do not have a primary care provider, please call [phone] and they will assist you.        Care EveryWhere ID     This is your Care EveryWhere ID. This could be used by other organizations to access your [facility] medical records  [id number]         Blood Pressure from Last 3 Encounters:   10/30/17 132/78   01/30/17 138/86   08/12/16 138/86    Weight from Last 3 Encounters:   10/30/17 91.6 kg (202 lb)   01/30/17 90.3 kg (199 lb)   08/12/16 90.7 kg (200 lb)              Today, you had the  following     No orders found for display       Primary Care Provider Office Phone # Fax #    Angus Llanes [phone] [address] [address]        Equal Access to Services     ZOILA CARROLL : Hadii aad ku hadyvetteo Soomaali, waaxda luqadaha, qaybta kaalmada adeegyada, luzmaria colonn pattiedinorah bryan laJoeljoanie amos. So [facility] [phone].    ATENCIÓN: Si habla español, tiene a lee disposición servicios gratuitos de asistencia lingüística. Llame al [phone].    We comply with applicable federal civil rights laws and [place] laws. We do not discriminate on the basis of race, color, national origin, age, disability, sex, sexual orientation, or gender identity.            Thank you!     Thank you for choosing [facility]  for your care. Our goal is always to provide you with excellent care. Hearing back from our patients is one way we can continue to improve our services. Please take a few minutes to complete the written survey that you may receive in the mail after your visit with us. Thank you!             Your Updated Medication List - Protect others around you: Learn how to safely use, store and throw away your medicines at www.disposemymeds.org.          This list is accurate as of: 12/8/17  1:57 PM.  Always use your most recent med list.                   Brand Name Dispense Instructions for use Diagnosis    albuterol 108 (90 BASE) MCG/ACT Inhaler    PROAIR HFA/PROVENTIL HFA/VENTOLIN HFA    1 Inhaler    Inhale 2 puffs into the lungs every 6 hours as needed for shortness of breath / dyspnea or wheezing    Reactive airway disease, mild intermittent, uncomplicated       amLODIPine 10 MG tablet    NORVASC    90 tablet    TAKE ONE TABLET BY MOUTH DAILY    Essential hypertension with goal blood pressure less than 140/90       aspirin 81 MG tablet     30 tablet    Take 1 tablet (81 mg) by mouth daily        DAILY MULTIVITAMIN PO      Take 1 tablet by  mouth daily.        GLUCOSAMINE CHONDR COMPLEX PO      Take 1 capsule by mouth 2 times daily. 1500 mg        omeprazole 20 MG tablet     90 tablet    Take 1 tablet (20 mg) by mouth daily Take 30-60 minutes before a meal.    GERD (gastroesophageal reflux disease)       predniSONE 20 MG tablet    DELTASONE    5 tablet    Take 1 tablet (20 mg) by mouth daily    Cough       simvastatin 40 MG tablet    ZOCOR    90 tablet    Take 1 tablet (40 mg) by mouth At Bedtime    Hyperlipidemia LDL goal <130       tadalafil 10 MG tablet    CIALIS    12 tablet    Take 0.5-1 tablets (5-10 mg) by mouth daily as needed for erectile dysfunction Never use with nitroglycerin, terazosin or doxazosin.    Erectile dysfunction, unspecified erectile dysfunction type       triamterene-hydrochlorothiazide 37.5-25 MG per capsule    DYAZIDE    90 capsule    TAKE ONE CAPSULE BY MOUTH DAILY    Essential hypertension with goal blood pressure less than 140/90       valsartan 160 MG tablet    DIOVAN    90 tablet    Take 1 tablet (160 mg) by mouth daily    Benign essential hypertension       VITAMIN B COMPLEX PO      Take 1 tablet by mouth daily.

## 2017-12-31 DIAGNOSIS — E78.5 HYPERLIPIDEMIA LDL GOAL <130: ICD-10-CM

## 2017-12-31 DIAGNOSIS — I10 BENIGN ESSENTIAL HYPERTENSION: ICD-10-CM

## 2018-01-02 RX ORDER — SIMVASTATIN 40 MG
TABLET ORAL
Qty: 90 TABLET | Refills: 1 | Status: SHIPPED | OUTPATIENT
Start: 2018-01-02 | End: 2018-06-17

## 2018-01-02 RX ORDER — VALSARTAN 160 MG/1
TABLET ORAL
Qty: 90 TABLET | Refills: 1 | Status: SHIPPED | OUTPATIENT
Start: 2018-01-02 | End: 2018-06-17

## 2018-02-21 ENCOUNTER — TRANSFERRED RECORDS (OUTPATIENT)
Dept: HEALTH INFORMATION MANAGEMENT | Facility: CLINIC | Age: 68
End: 2018-02-21

## 2018-03-29 ENCOUNTER — TRANSFERRED RECORDS (OUTPATIENT)
Dept: HEALTH INFORMATION MANAGEMENT | Facility: CLINIC | Age: 68
End: 2018-03-29

## 2018-05-09 DIAGNOSIS — Z00.00 ROUTINE GENERAL MEDICAL EXAMINATION AT A HEALTH CARE FACILITY: ICD-10-CM

## 2018-05-09 DIAGNOSIS — N18.30 CKD (CHRONIC KIDNEY DISEASE) STAGE 3, GFR 30-59 ML/MIN (H): ICD-10-CM

## 2018-05-09 LAB
ANION GAP SERPL CALCULATED.3IONS-SCNC: 8 MMOL/L (ref 3–14)
BUN SERPL-MCNC: 14 MG/DL (ref 7–30)
CALCIUM SERPL-MCNC: 9.1 MG/DL (ref 8.5–10.1)
CHLORIDE SERPL-SCNC: 111 MMOL/L (ref 94–109)
CO2 SERPL-SCNC: 26 MMOL/L (ref 20–32)
CREAT SERPL-MCNC: 1.08 MG/DL (ref 0.66–1.25)
GFR SERPL CREATININE-BSD FRML MDRD: 68 ML/MIN/1.7M2
GLUCOSE SERPL-MCNC: 112 MG/DL (ref 70–99)
POTASSIUM SERPL-SCNC: 3.6 MMOL/L (ref 3.4–5.3)
SODIUM SERPL-SCNC: 145 MMOL/L (ref 133–144)

## 2018-05-09 PROCEDURE — 36415 COLL VENOUS BLD VENIPUNCTURE: CPT | Performed by: INTERNAL MEDICINE

## 2018-05-09 PROCEDURE — 80048 BASIC METABOLIC PNL TOTAL CA: CPT | Performed by: INTERNAL MEDICINE

## 2018-05-09 NOTE — PROGRESS NOTES
Conor Milton,    I have had the opportunity to review your recent results and an interpretation is as follows:  Labs are stable     Sincerely,  Angus Llanes MD

## 2018-05-18 ENCOUNTER — OFFICE VISIT (OUTPATIENT)
Dept: FAMILY MEDICINE | Facility: CLINIC | Age: 68
End: 2018-05-18
Payer: COMMERCIAL

## 2018-05-18 VITALS
DIASTOLIC BLOOD PRESSURE: 85 MMHG | TEMPERATURE: 97.6 F | OXYGEN SATURATION: 97 % | BODY MASS INDEX: 29.79 KG/M2 | WEIGHT: 201.1 LBS | SYSTOLIC BLOOD PRESSURE: 135 MMHG | HEIGHT: 69 IN | HEART RATE: 64 BPM

## 2018-05-18 DIAGNOSIS — R05.9 COUGH: ICD-10-CM

## 2018-05-18 DIAGNOSIS — K22.70 BARRETT'S ESOPHAGUS WITHOUT DYSPLASIA: Primary | ICD-10-CM

## 2018-05-18 PROCEDURE — 99213 OFFICE O/P EST LOW 20 MIN: CPT | Performed by: NURSE PRACTITIONER

## 2018-05-18 RX ORDER — PANTOPRAZOLE SODIUM 40 MG/1
40 TABLET, DELAYED RELEASE ORAL DAILY
Qty: 60 TABLET | Refills: 1 | Status: SHIPPED | OUTPATIENT
Start: 2018-05-18 | End: 2018-07-13

## 2018-05-18 NOTE — MR AVS SNAPSHOT
"              After Visit Summary   5/18/2018    Yoan Baltazar    MRN: [id number]           Patient Information     Date Of Birth          1950        Visit Information        Provider Department      5/18/2018 8:00 AM Nida Allen APRN CNP [facility]        Today's Diagnoses     Boogie's esophagus without dysplasia    -  1    Cough           Follow-ups after your visit        Follow-up notes from your care team     Return if symptoms worsen or fail to improve.      Who to contact     If you have questions or need follow up information about today's clinic visit or your schedule please contact [facility] directly at [phone].  Normal or non-critical lab and imaging results will be communicated to you by MyChart, letter or phone within 4 business days after the clinic has received the results. If you do not hear from us within 7 days, please contact the clinic through [employer] or phone. If you have a critical or abnormal lab result, we will notify you by phone as soon as possible.  Submit refill requests through "[employer] or call your pharmacy and they will forward the refill request to us. Please allow 3 business days for your refill to be completed.          Additional Information About Your Visit        MyChart Information     "[employer] gives you secure access to your electronic health record. If you see a primary care provider, you can also send messages to your care team and make appointments. If you have questions, please call your primary care clinic.  If you do not have a primary care provider, please call [phone] and they will assist you.        Care EveryWhere ID     This is your Care EveryWhere ID. This could be used by other organizations to access your [facility] medical records  [id number]        Your Vitals Were     Pulse Temperature Height Pulse Oximetry BMI (Body Mass Index)       64 97.6  F (36.4  C) (Oral) 5' 9\" (1.753 m) 97% 29.7 kg/m2        Blood Pressure " from Last 3 Encounters:   05/18/18 135/85   10/30/17 132/78   01/30/17 138/86    Weight from Last 3 Encounters:   05/18/18 201 lb 1.6 oz (91.2 kg)   10/30/17 202 lb (91.6 kg)   01/30/17 199 lb (90.3 kg)              Today, you had the following     No orders found for display         Today's Medication Changes          These changes are accurate as of 5/18/18  4:01 PM.  If you have any questions, ask your nurse or doctor.               Start taking these medicines.        Dose/Directions    pantoprazole 40 MG EC tablet   Commonly known as:  PROTONIX   Used for:  Boogie's esophagus without dysplasia   Started by:  Nida Allen APRN CNP        Dose:  40 mg   Take 1 tablet (40 mg) by mouth daily Take 30-60 minutes before a meal.   Quantity:  60 tablet   Refills:  1         Stop taking these medicines if you haven't already. Please contact your care team if you have questions.     albuterol 108 (90 Base) MCG/ACT Inhaler   Commonly known as:  PROAIR HFA/PROVENTIL HFA/VENTOLIN HFA   Stopped by:  Nida Allen APRN CNP           predniSONE 20 MG tablet   Commonly known as:  DELTASONE   Stopped by:  Nida Allen APRN CNP                Where to get your medicines      These medications were sent to [id number] - [place], [place] - [address]  [address] [facility] [address]     Phone:  [phone]     pantoprazole 40 MG EC tablet                Primary Care Provider Office Phone # Fax #    Angus Llanes [phone] [address] [facility]  [facility] [address]        Equal Access to Services     HOANG CARROLL AH: Hadii yaneli Nunez, melissa vitale, qaybdimitri kaalmasandra brooks, luzmaria amos. So [facility] [phone].    ATENCIÓN: Si habla español, tiene a lee disposición servicios gratuitos de asistencia lingüística. Llame al [phone].    We comply with applicable federal civil rights laws and [place] laws.  We do not discriminate on the basis of race, color, national origin, age, disability, sex, sexual orientation, or gender identity.            Thank you!     Thank you for choosing [facility]  for your care. Our goal is always to provide you with excellent care. Hearing back from our patients is one way we can continue to improve our services. Please take a few minutes to complete the written survey that you may receive in the mail after your visit with us. Thank you!             Your Updated Medication List - Protect others around you: Learn how to safely use, store and throw away your medicines at www.disposemymeds.org.          This list is accurate as of 5/18/18  4:01 PM.  Always use your most recent med list.                   Brand Name Dispense Instructions for use Diagnosis    amLODIPine 10 MG tablet    NORVASC    90 tablet    TAKE ONE TABLET BY MOUTH DAILY    Essential hypertension with goal blood pressure less than 140/90       aspirin 81 MG tablet     30 tablet    Take 1 tablet (81 mg) by mouth daily        DAILY MULTIVITAMIN PO      Take 1 tablet by mouth daily.        GLUCOSAMINE CHONDR COMPLEX PO      Take 1 capsule by mouth 2 times daily. 1500 mg        omeprazole 20 MG tablet     90 tablet    Take 1 tablet (20 mg) by mouth daily Take 30-60 minutes before a meal.    GERD (gastroesophageal reflux disease)       pantoprazole 40 MG EC tablet    PROTONIX    60 tablet    Take 1 tablet (40 mg) by mouth daily Take 30-60 minutes before a meal.    Boogie's esophagus without dysplasia       simvastatin 40 MG tablet    ZOCOR    90 tablet    TAKE ONE TABLET BY MOUTH AT BEDTIME    Hyperlipidemia LDL goal <130       tadalafil 10 MG tablet    CIALIS    12 tablet    Take 0.5-1 tablets (5-10 mg) by mouth daily as needed for erectile dysfunction Never use with nitroglycerin, terazosin or doxazosin.    Erectile dysfunction, unspecified erectile dysfunction type       triamterene-hydrochlorothiazide 37.5-25  MG per capsule    DYAZIDE    90 capsule    TAKE ONE CAPSULE BY MOUTH DAILY    Essential hypertension with goal blood pressure less than 140/90       valsartan 160 MG tablet    DIOVAN    90 tablet    TAKE ONE TABLET BY MOUTH DAILY    Benign essential hypertension       VITAMIN B COMPLEX PO      Take 1 tablet by mouth daily.

## 2018-05-18 NOTE — PROGRESS NOTES
SUBJECTIVE:   Yoan Baltazar is a 67 year old male who presents to clinic today for the following health issues:      Dry Cough      Duration: for about 2 months has had a dry cough, no recent viral uri, no allergies or post nasal drainage     Description  persistant cough, no dysphagia, choking, wweight loss, chest pain or SOB or wheezing    Severity: mild    Accompanying signs and symptoms: does get occasional reflux     History (predisposing factors):  Boogie's esophagus on omeprazole 20mg but having break through symptoms    Precipitating or alleviating factors: None    Therapies tried and outcome:  Omeprazole, occasional tums    Never smoker, no alcohol use      Problem list and histories reviewed & adjusted, as indicated.  Additional history: as documented    Patient Active Problem List   Diagnosis     Dyslipidemia     Hyperlipidemia LDL goal <130     HTN (hypertension)     Advanced directives, counseling/discussion     Obesity     GERD (gastroesophageal reflux disease)     Glucose intolerance (pre-diabetes)     Overweight (BMI 25.0-29.9)     ED (erectile dysfunction)     OA (osteoarthritis) of knee     S/P total knee arthroplasty     Triggering of digit     S/P knee replacement     Knee pain     Aftercare following joint replacement     Knee joint replacement by other means     Boogie's esophagus without dysplasia     CKD (chronic kidney disease) stage 3, GFR 30-59 ml/min     Family history of pancreatic cancer     Renal cyst     Past Surgical History:   Procedure Laterality Date     ARTHROPLASTY KNEE Right 3/5/2015    Procedure: ARTHROPLASTY KNEE;  Surgeon: Yoan Oates MD;  [facility]     ARTHROSCOPY KNEE      bialt     EXCISE GANGLION WRIST      R wrsit     OPTICAL TRACKING SYSTEM ARTHROPLASTY KNEE Left 1/12/2015    Procedure: OPTICAL TRACKING SYSTEM ARTHROPLASTY KNEE;  Surgeon: Yoan Oates MD;  [facility]     SHOULDER SURGERY      bilat      tibial ostoetomy      2 on Left , R x1  -Dr Tita Isaac     TONSILLECTOMY         Social History   Substance Use Topics     Smoking status: Never Smoker     Smokeless tobacco: Never Used     Alcohol use 0.0 oz/week     0 Standard drinks or equivalent per week      Comment: maybe 1 glass of wine a month     Family History   Problem Relation Age of Onset     Hypertension Mother      Arthritis Mother      knee replacement     Arthritis Father      knee replacement     Pancreatic Cancer Sister      Prostate Cancer No family hx of          Current Outpatient Prescriptions   Medication Sig Dispense Refill     amLODIPine (NORVASC) 10 MG tablet TAKE ONE TABLET BY MOUTH DAILY 90 tablet 3     aspirin 81 MG tablet Take 1 tablet (81 mg) by mouth daily 30 tablet      B Complex Vitamins (VITAMIN B COMPLEX PO) Take 1 tablet by mouth daily.       Glucosamine-Chondroitin (GLUCOSAMINE CHONDR COMPLEX PO) Take 1 capsule by mouth 2 times daily. 1500 mg       Multiple Vitamin (DAILY MULTIVITAMIN PO) Take 1 tablet by mouth daily.       omeprazole 20 MG tablet Take 1 tablet (20 mg) by mouth daily Take 30-60 minutes before a meal. 90 tablet 3     pantoprazole (PROTONIX) 40 MG EC tablet Take 1 tablet (40 mg) by mouth daily Take 30-60 minutes before a meal. 60 tablet 1     simvastatin (ZOCOR) 40 MG tablet TAKE ONE TABLET BY MOUTH AT BEDTIME 90 tablet 1     tadalafil (CIALIS) 10 MG tablet Take 0.5-1 tablets (5-10 mg) by mouth daily as needed for erectile dysfunction Never use with nitroglycerin, terazosin or doxazosin. 12 tablet 11     triamterene-hydrochlorothiazide (DYAZIDE) 37.5-25 MG per capsule TAKE ONE CAPSULE BY MOUTH DAILY 90 capsule 1     valsartan (DIOVAN) 160 MG tablet TAKE ONE TABLET BY MOUTH DAILY 90 tablet 1     Allergies   Allergen Reactions     No Known Drug Allergy        Reviewed and updated as needed this visit by clinical staff  Tobacco  Allergies  Meds  Soc Hx      Reviewed and updated as needed this visit by Provider         ROS:  Constitutional, HEENT,  "cardiovascular, pulmonary, gi and gu systems are negative, except as otherwise noted.    OBJECTIVE:     /85 (BP Location: Left arm, Patient Position: Chair, Cuff Size: Adult Large)  Pulse 64  Temp 97.6  F (36.4  C) (Oral)  Ht 5' 9\" (1.753 m)  Wt 201 lb 1.6 oz (91.2 kg)  SpO2 97%  BMI 29.7 kg/m2  Body mass index is 29.7 kg/(m^2).  GENERAL: healthy, alert and no distress  EYES: Eyes grossly normal to inspection, PERRL and conjunctivae and sclerae normal  HENT: ear canals and TM's normal, nose and mouth without ulcers or lesions  NECK: no adenopathy, no asymmetry, masses, or scars and thyroid normal to palpation  RESP: lungs clear to auscultation - no rales, rhonchi or wheezes  CV: regular rate and rhythm, normal S1 S2,   ABDOMEN: soft, nontender, no hepatosplenomegaly, no masses and bowel sounds normal    Diagnostic Test Results:  none     ASSESSMENT/PLAN:       ICD-10-CM    1. Boogie's esophagus without dysplasia K22.70 pantoprazole (PROTONIX) 40 MG EC tablet   2. Cough R05    he will begin higher dose of protonix for one month then wean for a 4-6 weeks then resume omeprazole.     If symptoms not controlled in 2 weeks he will RTC for recheck       MARCIE Chavira [facility]  "

## 2018-05-18 NOTE — PROGRESS NOTES
"  SUBJECTIVE:   Yoan Baltazar is a 67 year old male who presents to clinic today for the following health issues:  {Provider please address medication reconciliation discrepancies--rooming staff please delete if no med/rec issues}    {ACUTE Problem  - brief histories:158507}    {additional problems for provider to add:411160}    Problem list and histories reviewed & adjusted, as indicated.  Additional history: {NONE - AS DOCUMENTED:478674::\"as documented\"}    {HIST REVIEW/ LINKS 2:254247}    Reviewed and updated as needed this visit by clinical staff       Reviewed and updated as needed this visit by Provider         {PROVIDER CHARTING PREFERENCE:241538}    "

## 2018-06-15 ENCOUNTER — MEDICAL CORRESPONDENCE (OUTPATIENT)
Dept: HEALTH INFORMATION MANAGEMENT | Facility: CLINIC | Age: 68
End: 2018-06-15

## 2018-06-15 ENCOUNTER — TRANSFERRED RECORDS (OUTPATIENT)
Dept: HEALTH INFORMATION MANAGEMENT | Facility: CLINIC | Age: 68
End: 2018-06-15

## 2018-06-15 NOTE — TELEPHONE ENCOUNTER
FUTURE VISIT INFORMATION      FUTURE VISIT INFORMATION:    Date: 07/20/2018    Time: 11:20    [place] [facility] INFORMATION:    Referring provider:  Juanita Soliman     Referring providers clinic:  [facility]    Reason for visit/diagnosis  Cough, Boogie's esophagues w/u dysplasia    RECORDS REQUESTED FROM:       Clinic name Comments Records Status Imaging Status   [facility] OFFICE VISIT: 06/15/2018  UPPER GI ENDOSCOPY 01/04/2017 EXTERNAL NONE   [facility] OFFICE VISIT: 05/18/2018, 10/30/2017,01/30/2017 AND 08/12/2016  IMAGE: XR CHEST 01/30/2017 INTERNAL YES                             RECORDS STATUS    PUT RECORDS IN HIM BAG TO GET SCANNED

## 2018-06-17 DIAGNOSIS — E78.5 HYPERLIPIDEMIA LDL GOAL <130: ICD-10-CM

## 2018-06-17 DIAGNOSIS — I10 BENIGN ESSENTIAL HYPERTENSION: ICD-10-CM

## 2018-06-19 NOTE — TELEPHONE ENCOUNTER
"Requested Prescriptions   Pending Prescriptions Disp Refills     valsartan (DIOVAN) 160 MG tablet [Pharmacy Med Name: VALSARTAN 160MG TABS] 90 tablet 1    Last Written Prescription Date:  1/2/18  Last Fill Quantity: 90,  # refills: 1   Last office visit: 5/18/2018 with prescribing provider:     Future Office Visit:     Sig: TAKE ONE TABLET BY MOUTH DAILY    Angiotensin-II Receptors Passed    6/17/2018  8:17 AM       Passed - Blood pressure under 140/90 in past 12 months    BP Readings from Last 3 Encounters:   05/18/18 135/85   10/30/17 132/78   01/30/17 138/86                Passed - Recent (12 mo) or future (30 days) visit within the authorizing provider's specialty    Patient had office visit in the last 12 months or has a visit in the next 30 days with authorizing provider or within the authorizing provider's specialty.  See \"Patient Info\" tab in inbasket, or \"Choose Columns\" in Meds & Orders section of the refill encounter.           Passed - Patient is age 18 or older       Passed - Normal serum creatinine on file in past 12 months    Recent Labs   Lab Test  05/09/18   0758   CR  1.08            Passed - Normal serum potassium on file in past 12 months    Recent Labs   Lab Test  05/09/18   0758   POTASSIUM  3.6                    simvastatin (ZOCOR) 40 MG tablet [Pharmacy Med Name: SIMVASTATIN 40MG TABS] 90 tablet 1    Last Written Prescription Date:  1/2/18  Last Fill Quantity: 90,  # refills: 1   Last office visit: 5/18/2018 with prescribing provider:     Future Office Visit:     Sig: TAKE ONE TABLET BY MOUTH AT BEDTIME    Statins Protocol Passed    6/17/2018  8:17 AM       Passed - LDL on file in past 12 months    Recent Labs   Lab Test  10/24/17   0758   LDL  68            Passed - No abnormal creatine kinase in past 12 months    No lab results found.            Passed - Recent (12 mo) or future (30 days) visit within the authorizing provider's specialty    Patient had office visit in the last 12 months or " "has a visit in the next 30 days with authorizing provider or within the authorizing provider's specialty.  See \"Patient Info\" tab in inbasket, or \"Choose Columns\" in Meds & Orders section of the refill encounter.           Passed - Patient is age 18 or older          "

## 2018-06-20 RX ORDER — VALSARTAN 160 MG/1
TABLET ORAL
Qty: 90 TABLET | Refills: 0 | Status: SHIPPED | OUTPATIENT
Start: 2018-06-20 | End: 2018-07-23

## 2018-06-20 RX ORDER — SIMVASTATIN 40 MG
TABLET ORAL
Qty: 90 TABLET | Refills: 0 | Status: SHIPPED | OUTPATIENT
Start: 2018-06-20 | End: 2018-09-23

## 2018-06-20 NOTE — TELEPHONE ENCOUNTER
Prescriptions approved per [facility] Refill Protocol.  Edwige SMILEY RN

## 2018-07-13 ENCOUNTER — OFFICE VISIT (OUTPATIENT)
Dept: FAMILY MEDICINE | Facility: CLINIC | Age: 68
End: 2018-07-13
Payer: COMMERCIAL

## 2018-07-13 VITALS
BODY MASS INDEX: 28.88 KG/M2 | HEIGHT: 69 IN | HEART RATE: 71 BPM | TEMPERATURE: 97.5 F | SYSTOLIC BLOOD PRESSURE: 132 MMHG | DIASTOLIC BLOOD PRESSURE: 82 MMHG | OXYGEN SATURATION: 98 % | WEIGHT: 195 LBS

## 2018-07-13 DIAGNOSIS — R82.90 NONSPECIFIC FINDING ON EXAMINATION OF URINE: ICD-10-CM

## 2018-07-13 DIAGNOSIS — R30.0 DYSURIA: ICD-10-CM

## 2018-07-13 DIAGNOSIS — K22.70 BARRETT'S ESOPHAGUS WITHOUT DYSPLASIA: ICD-10-CM

## 2018-07-13 DIAGNOSIS — N39.0 URINARY TRACT INFECTION WITHOUT HEMATURIA, SITE UNSPECIFIED: Primary | ICD-10-CM

## 2018-07-13 LAB
ALBUMIN UR-MCNC: 30 MG/DL
APPEARANCE UR: ABNORMAL
BACTERIA #/AREA URNS HPF: ABNORMAL /HPF
BILIRUB UR QL STRIP: NEGATIVE
COLOR UR AUTO: YELLOW
GLUCOSE UR STRIP-MCNC: NEGATIVE MG/DL
HGB UR QL STRIP: ABNORMAL
KETONES UR STRIP-MCNC: NEGATIVE MG/DL
LEUKOCYTE ESTERASE UR QL STRIP: ABNORMAL
NITRATE UR QL: POSITIVE
PH UR STRIP: 6 PH (ref 5–7)
RBC #/AREA URNS AUTO: >100 /HPF
SOURCE: ABNORMAL
SP GR UR STRIP: 1.02 (ref 1–1.03)
UROBILINOGEN UR STRIP-ACNC: 0.2 EU/DL (ref 0.2–1)
WBC #/AREA URNS AUTO: >100 /HPF

## 2018-07-13 PROCEDURE — 87186 SC STD MICRODIL/AGAR DIL: CPT | Performed by: NURSE PRACTITIONER

## 2018-07-13 PROCEDURE — 99213 OFFICE O/P EST LOW 20 MIN: CPT | Performed by: NURSE PRACTITIONER

## 2018-07-13 PROCEDURE — 87088 URINE BACTERIA CULTURE: CPT | Performed by: NURSE PRACTITIONER

## 2018-07-13 PROCEDURE — 81001 URINALYSIS AUTO W/SCOPE: CPT | Performed by: NURSE PRACTITIONER

## 2018-07-13 PROCEDURE — 87086 URINE CULTURE/COLONY COUNT: CPT | Performed by: NURSE PRACTITIONER

## 2018-07-13 RX ORDER — CIPROFLOXACIN 500 MG/1
500 TABLET, FILM COATED ORAL 2 TIMES DAILY
Qty: 14 TABLET | Refills: 0 | Status: SHIPPED | OUTPATIENT
Start: 2018-07-13 | End: 2018-07-16

## 2018-07-13 RX ORDER — PANTOPRAZOLE SODIUM 40 MG/1
40 TABLET, DELAYED RELEASE ORAL DAILY
Qty: 60 TABLET | Refills: 1 | Status: SHIPPED | OUTPATIENT
Start: 2018-07-13 | End: 2018-11-21

## 2018-07-13 NOTE — PROGRESS NOTES
"HPI      SUBJECTIVE:   Yoan Baltazar is a 67 year old male who presents to clinic today for the following health issues:      Genitourinary symptoms      Duration: Since yesterday    Description:  dysuria and frequency    Intensity:  moderate    Accompanying signs and symptoms (fever/discharge/nausea/vomiting/back or abdominal pain):  None    History (frequent UTI's/kidney stones/prostate problems): None  Sexually active: YES    Precipitating or alleviating factors: None    Therapies tried and outcome: none   Outcome: NA    Yesterday noticed increased frequency  Needing to go every 30 min  Not a lot \"volume wise\" all the time  No episodes of incontience  Burning at the end of the penis  No history of infection  Have been to a urologist to retention years ago  Urine light yellow  Does not appear to be cloudy  No changes in odor  No fevers  No increase in fluid intake  Today still feeling the urgency  No abdominal pain or tenderness    Past Medical History:   Diagnosis Date     Glucose intolerance (pre-diabetes)      HTN (hypertension)      Hyperlipidaemia      Overweight (BMI 25.0-29.9)      Seasonal allergies     s/p allergy shots     Family History   Problem Relation Age of Onset     Hypertension Mother      Arthritis Mother      knee replacement     Arthritis Father      knee replacement     Pancreatic Cancer Sister      Prostate Cancer No family hx of      Past Surgical History:   Procedure Laterality Date     ARTHROPLASTY KNEE Right 3/5/2015    Procedure: ARTHROPLASTY KNEE;  Surgeon: Yoan Otaes MD;  [facility]     ARTHROSCOPY KNEE      bialt     EXCISE GANGLION WRIST      R wrsit     OPTICAL TRACKING SYSTEM ARTHROPLASTY KNEE Left 1/12/2015    Procedure: OPTICAL TRACKING SYSTEM ARTHROPLASTY KNEE;  Surgeon: Yoan Oates MD;  [facility]     SHOULDER SURGERY      bilat      tibial ostoetomy      2 on Left , R x1 -Dr Tita Isaac     TONSILLECTOMY       Social History   Substance Use Topics " "    Smoking status: Never Smoker     Smokeless tobacco: Never Used     Alcohol use 0.0 oz/week     0 Standard drinks or equivalent per week      Comment: maybe 1 glass of wine a month     Current Outpatient Prescriptions   Medication Sig Dispense Refill     amLODIPine (NORVASC) 10 MG tablet TAKE ONE TABLET BY MOUTH DAILY 90 tablet 3     aspirin 81 MG tablet Take 1 tablet (81 mg) by mouth daily 30 tablet      B Complex Vitamins (VITAMIN B COMPLEX PO) Take 1 tablet by mouth daily.       Glucosamine-Chondroitin (GLUCOSAMINE CHONDR COMPLEX PO) Take 1 capsule by mouth 2 times daily. 1500 mg       Multiple Vitamin (DAILY MULTIVITAMIN PO) Take 1 tablet by mouth daily.       pantoprazole (PROTONIX) 40 MG EC tablet Take 1 tablet (40 mg) by mouth daily Take 30-60 minutes before a meal. 60 tablet 1     simvastatin (ZOCOR) 40 MG tablet TAKE ONE TABLET BY MOUTH AT BEDTIME 90 tablet 0     tadalafil (CIALIS) 10 MG tablet Take 0.5-1 tablets (5-10 mg) by mouth daily as needed for erectile dysfunction Never use with nitroglycerin, terazosin or doxazosin. 12 tablet 11     triamterene-hydrochlorothiazide (DYAZIDE) 37.5-25 MG per capsule TAKE ONE CAPSULE BY MOUTH DAILY 90 capsule 1     valsartan (DIOVAN) 160 MG tablet TAKE ONE TABLET BY MOUTH DAILY 90 tablet 0     Allergies   Allergen Reactions     No Known Drug Allergy        Reviewed and updated as needed this visit by clinical staff and provider     ROS  Detailed as above      /82 (BP Location: Right arm, Cuff Size: Adult Regular)  Pulse 71  Temp 97.5  F (36.4  C) (Oral)  Ht 5' 9\" (1.753 m)  Wt 195 lb (88.5 kg)  SpO2 98%  BMI 28.8 kg/m2      Physical Exam   Constitutional: He is oriented to person, place, and time and well-developed, well-nourished, and in no distress.   Neurological: He is alert and oriented to person, place, and time.   Skin: Skin is warm and dry.   Psychiatric: Mood, affect and judgment normal.   Vitals reviewed.    Assessment and Plan:       ICD-10-CM "    1. Urinary tract infection without hematuria, site unspecified N39.0 DISCONTINUED: ciprofloxacin (CIPRO) 500 MG tablet   2. Dysuria R30.0 UA reflex to Microscopic and Culture     Urine Microscopic     Urine Culture Aerobic Bacterial   3. Boogie's esophagus without dysplasia K22.70 pantoprazole (PROTONIX) 40 MG EC tablet   4. Nonspecific finding on examination of urine R82.90 Urine Culture Aerobic Bacterial       Positive UA. Will treat. Call or rtc prn      Refilled Pantoprazole      MARCIE Allen, CNP  [facility]

## 2018-07-13 NOTE — MR AVS SNAPSHOT
"              After Visit Summary   7/13/2018    Yoan Baltazar    MRN: [id number]           Patient Information     Date Of Birth          1950        Visit Information        Provider Department      7/13/2018 10:00 AM Dulce Rosa APRN CNP [facility]        Today's Diagnoses     Urinary tract infection without hematuria, site unspecified    -  1    Nonspecific finding on examination of urine        Dysuria        Boogie's esophagus without dysplasia           Follow-ups after your visit        Who to contact     If you have questions or need follow up information about today's clinic visit or your schedule please contact [facility] directly at [phone].  Normal or non-critical lab and imaging results will be communicated to you by [employer], letter or phone within 4 business days after the clinic has received the results. If you do not hear from us within 7 days, please contact the clinic through [employer] or phone. If you have a critical or abnormal lab result, we will notify you by phone as soon as possible.  Submit refill requests through [employer] or call your pharmacy and they will forward the refill request to us. Please allow 3 business days for your refill to be completed.          Additional Information About Your Visit        MyChart Information     [employer] gives you secure access to your electronic health record. If you see a primary care provider, you can also send messages to your care team and make appointments. If you have questions, please call your primary care clinic.  If you do not have a primary care provider, please call [phone] and they will assist you.        Care EveryWhere ID     This is your Care EveryWhere ID. This could be used by other organizations to access your [facility] medical records  [id number]        Your Vitals Were     Pulse Temperature Height Pulse Oximetry BMI (Body Mass Index)       71 97.5  F (36.4  C) (Oral) 5' 9\" (1.753 m) 98% " 28.8 kg/m2        Blood Pressure from Last 3 Encounters:   07/13/18 132/82   05/18/18 135/85   10/30/17 132/78    Weight from Last 3 Encounters:   07/20/18 195 lb (88.5 kg)   07/13/18 195 lb (88.5 kg)   05/18/18 201 lb 1.6 oz (91.2 kg)              We Performed the Following     UA reflex to Microscopic and Culture     Urine Culture Aerobic Bacterial     Urine Microscopic          Today's Medication Changes          These changes are accurate as of 7/13/18 11:59 PM.  If you have any questions, ask your nurse or doctor.               Start taking these medicines.        Dose/Directions    ciprofloxacin 500 MG tablet   Commonly known as:  CIPRO   Used for:  Urinary tract infection without hematuria, site unspecified   Started by:  Dulce Rosa APRN CNP        Dose:  500 mg   Take 1 tablet (500 mg) by mouth 2 times daily   Quantity:  14 tablet   Refills:  0         Stop taking these medicines if you haven't already. Please contact your care team if you have questions.     omeprazole 20 MG tablet   Stopped by:  Dulce Rosa APRN CNP                Where to get your medicines      These medications were sent to [id number] - [place], [place] - [address]  [address] [place] [address]     Phone:  [phone]     ciprofloxacin 500 MG tablet    pantoprazole 40 MG EC tablet                Primary Care Provider Office Phone # Fax #    Angus Llanes [phone] [address] [facility] [address]  [facility] [address]        Equal Access to Services     [facility] AH: Hadii aad ku hadasho Soomaali, waaxda luqadaha, qaybta kaalmada adeegyada, luzmaria amos. So [facility] [phone].    ATENCIÓN: Si habla español, tiene a lee disposición servicios gratuitos de asistencia lingüística. Llame al [phone].    We comply with applicable federal civil rights laws and [place] laws. We do not discriminate on the basis of race, color,  national origin, age, disability, sex, sexual orientation, or gender identity.            Thank you!     Thank you for choosing [facility]  for your care. Our goal is always to provide you with excellent care. Hearing back from our patients is one way we can continue to improve our services. Please take a few minutes to complete the written survey that you may receive in the mail after your visit with us. Thank you!             Your Updated Medication List - Protect others around you: Learn how to safely use, store and throw away your medicines at www.disposemymeds.org.          This list is accurate as of 7/13/18 11:59 PM.  Always use your most recent med list.                   Brand Name Dispense Instructions for use Diagnosis    amLODIPine 10 MG tablet    NORVASC    90 tablet    TAKE ONE TABLET BY MOUTH DAILY    Essential hypertension with goal blood pressure less than 140/90       aspirin 81 MG tablet     30 tablet    Take 1 tablet (81 mg) by mouth daily        ciprofloxacin 500 MG tablet    CIPRO    14 tablet    Take 1 tablet (500 mg) by mouth 2 times daily    Urinary tract infection without hematuria, site unspecified       DAILY MULTIVITAMIN PO      Take 1 tablet by mouth daily.        GLUCOSAMINE CHONDR COMPLEX PO      Take 1 capsule by mouth 2 times daily. 1500 mg        pantoprazole 40 MG EC tablet    PROTONIX    60 tablet    Take 1 tablet (40 mg) by mouth daily Take 30-60 minutes before a meal.    Boogie's esophagus without dysplasia       simvastatin 40 MG tablet    ZOCOR    90 tablet    TAKE ONE TABLET BY MOUTH AT BEDTIME    Hyperlipidemia LDL goal <130       tadalafil 10 MG tablet    CIALIS    12 tablet    Take 0.5-1 tablets (5-10 mg) by mouth daily as needed for erectile dysfunction Never use with nitroglycerin, terazosin or doxazosin.    Erectile dysfunction, unspecified erectile dysfunction type       triamterene-hydrochlorothiazide 37.5-25 MG per capsule    DYAZIDE    90 capsule     TAKE ONE CAPSULE BY MOUTH DAILY    Essential hypertension with goal blood pressure less than 140/90       valsartan 160 MG tablet    DIOVAN    90 tablet    TAKE ONE TABLET BY MOUTH DAILY    Benign essential hypertension       VITAMIN B COMPLEX PO      Take 1 tablet by mouth daily.

## 2018-07-15 LAB
BACTERIA SPEC CULT: ABNORMAL
BACTERIA SPEC CULT: ABNORMAL
SPECIMEN SOURCE: ABNORMAL

## 2018-07-16 ENCOUNTER — TELEPHONE (OUTPATIENT)
Dept: FAMILY MEDICINE | Facility: CLINIC | Age: 68
End: 2018-07-16

## 2018-07-16 DIAGNOSIS — N39.0 URINARY TRACT INFECTION WITHOUT HEMATURIA, SITE UNSPECIFIED: ICD-10-CM

## 2018-07-16 RX ORDER — SULFAMETHOXAZOLE/TRIMETHOPRIM 800-160 MG
1 TABLET ORAL 2 TIMES DAILY
Qty: 14 TABLET | Refills: 0 | Status: SHIPPED | OUTPATIENT
Start: 2018-07-16 | End: 2019-02-19

## 2018-07-16 NOTE — TELEPHONE ENCOUNTER
To PCP:     Please review message below and advise. Pharmacy is pended.     Thank you,   Annel MICHAEL RN

## 2018-07-16 NOTE — TELEPHONE ENCOUNTER
Reason for Call:  Pt requesting Med Change     Detailed comments: PT saw NP Mundojulieta and was diagnosed with a UTI and was given ciprofloxacin (CIPRO) 500 MG tablet pt states that after reading the side effects he decided that he cannot take this  He has not taken any. He is requesting a new antibiotic  He is requesting Bactrim or something that has less side effects         [id number] - [place], [place] - [address]    Phone Number Patient can be reached at: Home number on file [phone] (home)    Best Time: any    Can we leave a detailed message on this number? YES    Call taken on 7/16/2018 at 7:24 AM by Nakia Chu

## 2018-07-16 NOTE — TELEPHONE ENCOUNTER
Patient is calling back regarding his rx. Would like a new one today please. [phone] ok to leave a detailed message.

## 2018-07-16 NOTE — TELEPHONE ENCOUNTER
Placed call.  Information given to patient from PCP.  States understood and agreed with advise.  Martina Huffman RN

## 2018-07-20 ENCOUNTER — PRE VISIT (OUTPATIENT)
Dept: OTOLARYNGOLOGY | Facility: CLINIC | Age: 68
End: 2018-07-20

## 2018-07-20 ENCOUNTER — OFFICE VISIT (OUTPATIENT)
Dept: OTOLARYNGOLOGY | Facility: CLINIC | Age: 68
End: 2018-07-20
Payer: COMMERCIAL

## 2018-07-20 VITALS — HEIGHT: 69 IN | BODY MASS INDEX: 28.88 KG/M2 | WEIGHT: 195 LBS

## 2018-07-20 DIAGNOSIS — R05.3 CHRONIC COUGH: Primary | ICD-10-CM

## 2018-07-20 ASSESSMENT — PAIN SCALES - GENERAL: PAINLEVEL: NO PAIN (0)

## 2018-07-20 NOTE — PROGRESS NOTES
Mr Delaney presented today, but indicated that he is entirely asymptomatic from his cough at this point, which was his referring symptom.  He indicated that he has no voice, swallow, or breathing problems either.  Therefore we agreed not to have an official visit or endoscopy today. He was understandably frustrated as he had waited a while because the clinic was running behind, but was comfortable with this plan. I invited him to return as needed.

## 2018-07-20 NOTE — PATIENT INSTRUCTIONS
1.  You were seen in the ENT Clinic today by Dr. Dotson.  If you have any questions or concerns after your appointment, please call [phone].  Press option #1 for scheduling related needs.  Press option #3 for Nurse advice.  2.  Plan is to return to clinic as needed.     Malika ADKINSN, RN  [facility]   Head & Neck Surgery

## 2018-07-20 NOTE — LETTER
7/20/2018       RE: Yoan Baltazar  [address] [address] [address]     Dear Colleague,    Thank you for referring your patient, Yoan Baltazar, to the [facility] EAR NOSE AND THROAT at [facility]. Please see a copy of my visit note below.    Mr Baltazar presented today, but indicated that he is entirely asymptomatic from his cough at this point, which was his referring symptom.  He indicated that he has no voice, swallow, or breathing problems either.  Therefore we agreed not to have an official visit or endoscopy today. He was understandably frustrated as he had waited a while because the clinic was running behind, but was comfortable with this plan. I invited him to return as needed.    Again, thank you for allowing me to participate in the care of your patient.      Sincerely,    Kristen Dotson MD

## 2018-07-20 NOTE — NURSING NOTE
"Chief Complaint   Patient presents with     Consult     battetts esophagues     Height 1.753 m (5' 9\"), weight 88.5 kg (195 lb).    Zbigniew Brody    "

## 2018-07-20 NOTE — MR AVS SNAPSHOT
"              After Visit Summary   7/20/2018    Yoan Baltazar    MRN: [id number]           Patient Information     Date Of Birth          1950        Visit Information        Provider Department      7/20/2018 11:20 AM Kristen Dotson MD [facility]        Today's Diagnoses     Chronic cough    -  1       Follow-ups after your visit        Who to contact     Please call your clinic at [phone] to:    Ask questions about your health    Make or cancel appointments    Discuss your medicines    Learn about your test results    Speak to your doctor            Additional Information About Your Visit        MyChart Information     "[employer] gives you secure access to your electronic health record. If you see a primary care provider, you can also send messages to your care team and make appointments. If you have questions, please call your primary care clinic.  If you do not have a primary care provider, please call [phone] and they will assist you.      "[employer] is an electronic gateway that provides easy, online access to your medical records. With "[employer] you can request a clinic appointment, read your test results, renew a prescription or communicate with your care team.     To access your existing account, please contact your [facility] or call [phone] for assistance.        Care EveryWhere ID     This is your Care EveryWhere ID. This could be used by other organizations to access your [place] medical records  [id number]        Your Vitals Were     Height BMI (Body Mass Index)                1.753 m (5' 9\") 28.8 kg/m2           Blood Pressure from Last 3 Encounters:   07/13/18 132/82   05/18/18 135/85   10/30/17 132/78    Weight from Last 3 Encounters:   07/20/18 88.5 kg (195 lb)   07/13/18 88.5 kg (195 lb)   05/18/18 91.2 kg (201 lb 1.6 oz)              We Performed the Following     IMAGESTREAM RECORDING ORDER        Primary Care Provider " Office Phone # Fax #    Angus Llanes [phone] [address] [address]        Equal Access to Services     ZOILA CARROLL : Hadii yaneli ku josé miguelo Soibrahimaali, waaxda luqadaha, qaybta kaalmada adeprosper, luzmaria bryan laJoeljoanie amos. So [facility] [phone].    ATENCIÓN: Si habla español, tiene a lee disposición servicios gratuitos de asistencia lingüística. Llame al [phone].    We comply with applicable federal civil rights laws and [place] laws. We do not discriminate on the basis of race, color, national origin, age, disability, sex, sexual orientation, or gender identity.            Thank you!     Thank you for choosing [facility]  for your care. Our goal is always to provide you with excellent care. Hearing back from our patients is one way we can continue to improve our services. Please take a few minutes to complete the written survey that you may receive in the mail after your visit with us. Thank you!             Your Updated Medication List - Protect others around you: Learn how to safely use, store and throw away your medicines at www.disposemymeds.org.          This list is accurate as of 7/20/18  1:11 PM.  Always use your most recent med list.                   Brand Name Dispense Instructions for use Diagnosis    amLODIPine 10 MG tablet    NORVASC    90 tablet    TAKE ONE TABLET BY MOUTH DAILY    Essential hypertension with goal blood pressure less than 140/90       aspirin 81 MG tablet     30 tablet    Take 1 tablet (81 mg) by mouth daily        DAILY MULTIVITAMIN PO      Take 1 tablet by mouth daily.        GLUCOSAMINE CHONDR COMPLEX PO      Take 1 capsule by mouth 2 times daily. 1500 mg        pantoprazole 40 MG EC tablet    PROTONIX    60 tablet    Take 1 tablet (40 mg) by mouth daily Take 30-60 minutes before a meal.    Boogie's esophagus without dysplasia       simvastatin 40 MG tablet    ZOCOR    90 tablet    TAKE  ONE TABLET BY MOUTH AT BEDTIME    Hyperlipidemia LDL goal <130       sulfamethoxazole-trimethoprim 800-160 MG per tablet    BACTRIM DS/SEPTRA DS    14 tablet    Take 1 tablet by mouth 2 times daily    Urinary tract infection without hematuria, site unspecified       tadalafil 10 MG tablet    CIALIS    12 tablet    Take 0.5-1 tablets (5-10 mg) by mouth daily as needed for erectile dysfunction Never use with nitroglycerin, terazosin or doxazosin.    Erectile dysfunction, unspecified erectile dysfunction type       triamterene-hydrochlorothiazide 37.5-25 MG per capsule    DYAZIDE    90 capsule    TAKE ONE CAPSULE BY MOUTH DAILY    Essential hypertension with goal blood pressure less than 140/90       valsartan 160 MG tablet    DIOVAN    90 tablet    TAKE ONE TABLET BY MOUTH DAILY    Benign essential hypertension       VITAMIN B COMPLEX PO      Take 1 tablet by mouth daily.

## 2018-07-23 ENCOUNTER — TELEPHONE (OUTPATIENT)
Dept: FAMILY MEDICINE | Facility: CLINIC | Age: 68
End: 2018-07-23

## 2018-07-23 DIAGNOSIS — I10 BENIGN ESSENTIAL HYPERTENSION: ICD-10-CM

## 2018-07-23 RX ORDER — LOSARTAN POTASSIUM 50 MG/1
50 TABLET ORAL DAILY
Qty: 90 TABLET | Refills: 3 | Status: SHIPPED | OUTPATIENT
Start: 2018-07-23 | End: 2019-02-07

## 2018-07-29 DIAGNOSIS — I10 ESSENTIAL HYPERTENSION WITH GOAL BLOOD PRESSURE LESS THAN 140/90: ICD-10-CM

## 2018-07-30 NOTE — TELEPHONE ENCOUNTER
"triamterene-hydrochlorothiazide (DYAZIDE) 37.5-25 MG per capsule 90 capsule 1 11/14/2017           Last Written Prescription Date:  11/14/2017  Last Fill Quantity: 90,  # refills: 1   Last office visit: 7/13/2018 with prescribing provider:  [place]   Future Office Visit:  Unknown  Requested Prescriptions   Pending Prescriptions Disp Refills     triamterene-hydrochlorothiazide (DYAZIDE) 37.5-25 MG per capsule [Pharmacy Med Name: TRIAMTERENE-HCTZ 37.5-25MG CAPS] 90 capsule 1     Sig: TAKE ONE CAPSULE BY MOUTH DAILY    Diuretics (Including Combos) Protocol Failed    7/29/2018  8:14 AM       Failed - Normal serum sodium on file in past 12 months    Recent Labs   Lab Test  05/09/18   0758   NA  145*             Passed - Blood pressure under 140/90 in past 12 months    BP Readings from Last 3 Encounters:   07/13/18 132/82   05/18/18 135/85   10/30/17 132/78                Passed - Recent (12 mo) or future (30 days) visit within the authorizing provider's specialty    Patient had office visit in the last 12 months or has a visit in the next 30 days with authorizing provider or within the authorizing provider's specialty.  See \"Patient Info\" tab in inbasket, or \"Choose Columns\" in Meds & Orders section of the refill encounter.           Passed - Patient is age 18 or older       Passed - Normal serum creatinine on file in past 12 months    Recent Labs   Lab Test  05/09/18   0758   CR  1.08             Passed - Normal serum potassium on file in past 12 months    Recent Labs   Lab Test  05/09/18   0758   POTASSIUM  3.6                      "

## 2018-07-31 RX ORDER — TRIAMTERENE AND HYDROCHLOROTHIAZIDE 37.5; 25 MG/1; MG/1
CAPSULE ORAL
Qty: 90 CAPSULE | Refills: 0 | Status: SHIPPED | OUTPATIENT
Start: 2018-07-31 | End: 2018-10-21

## 2018-07-31 NOTE — TELEPHONE ENCOUNTER
Last OV with PCP 10/30/17  Lab stable per PCP's notes from 5/9/18 labs   Prescription approved per [facility] Refill Protocol.    Edwige SMILEY RN

## 2018-11-03 ENCOUNTER — TRANSFERRED RECORDS (OUTPATIENT)
Dept: HEALTH INFORMATION MANAGEMENT | Facility: CLINIC | Age: 68
End: 2018-11-03

## 2018-11-21 DIAGNOSIS — K22.70 BARRETT'S ESOPHAGUS WITHOUT DYSPLASIA: ICD-10-CM

## 2018-11-21 NOTE — TELEPHONE ENCOUNTER
"Last Written Prescription Date:  7/13/2018  Last Fill Quantity: 60,  # refills: 1   Last office visit: 7/13/2018 with prescribing provider:     Future Office Visit:    Requested Prescriptions   Pending Prescriptions Disp Refills     pantoprazole (PROTONIX) 40 MG EC tablet [Pharmacy Med Name: Pantoprazole Sodium Oral Tablet Delayed Release 40 MG] 60 tablet 0     Sig: TAKE ONE TABLET BY MOUTH ONE TIME DAILY 30-60 minutes before a meal    PPI Protocol Passed    11/21/2018 10:21 AM       Passed - Not on Clopidogrel (unless Pantoprazole ordered)       Passed - No diagnosis of osteoporosis on record       Passed - Recent (12 mo) or future (30 days) visit within the authorizing provider's specialty    Patient had office visit in the last 12 months or has a visit in the next 30 days with authorizing provider or within the authorizing provider's specialty.  See \"Patient Info\" tab in inbasket, or \"Choose Columns\" in Meds & Orders section of the refill encounter.             Passed - Patient is age 18 or older          "

## 2018-11-23 RX ORDER — PANTOPRAZOLE SODIUM 40 MG/1
TABLET, DELAYED RELEASE ORAL
Qty: 60 TABLET | Refills: 0 | Status: SHIPPED | OUTPATIENT
Start: 2018-11-23 | End: 2019-02-19

## 2018-11-23 NOTE — TELEPHONE ENCOUNTER
Routing refill request to provider for review/approval because:  Cannot fill for DX of:     Boogie's esophagus without dysplasia        Due for Routine Physical. Note made in Pharmacy Notes advising pt to schedule.     Please review and authorize if appropriate,     Thank you,   Annel CADE RN

## 2018-12-03 NOTE — TELEPHONE ENCOUNTER
Prescription approved per [facility] Refill Protocol.  
denies pain/discomfort

## 2018-12-12 ENCOUNTER — TRANSFERRED RECORDS (OUTPATIENT)
Dept: HEALTH INFORMATION MANAGEMENT | Facility: CLINIC | Age: 68
End: 2018-12-12

## 2019-02-07 DIAGNOSIS — I10 BENIGN ESSENTIAL HYPERTENSION: ICD-10-CM

## 2019-02-07 DIAGNOSIS — E78.5 HYPERLIPIDEMIA LDL GOAL <130: ICD-10-CM

## 2019-02-07 DIAGNOSIS — I10 ESSENTIAL HYPERTENSION WITH GOAL BLOOD PRESSURE LESS THAN 140/90: ICD-10-CM

## 2019-02-07 RX ORDER — AMLODIPINE BESYLATE 10 MG/1
10 TABLET ORAL DAILY
Qty: 90 TABLET | Refills: 1 | Status: SHIPPED | OUTPATIENT
Start: 2019-02-07 | End: 2019-02-19

## 2019-02-07 RX ORDER — LOSARTAN POTASSIUM 50 MG/1
50 TABLET ORAL DAILY
Qty: 90 TABLET | Refills: 1 | Status: SHIPPED | OUTPATIENT
Start: 2019-02-07 | End: 2019-02-19

## 2019-02-07 NOTE — TELEPHONE ENCOUNTER
"triamterene-HCTZ (DYAZIDE) 37.5-25 MG capsule  Last Written Prescription Date:  10/22/18  Last Fill Quantity: 90 capsule,  # refills: 0   Last office visit: 10/30/2017 with prescribing provider:  Idel[place]ope   Future Office Visit:      simvastatin (ZOCOR) 40 MG tablet  Last Written Prescription Date:  12/31/18  Last Fill Quantity: 90 tablet,  # refills: 3   Last office visit: 10/30/2017 with prescribing provider:  Idelkope   Future Office Visit:      losartan (COZAAR) 50 MG tablet  Last Written Prescription Date:  7/23/18  Last Fill Quantity: 90 tablet,  # refills: 3   Last office visit: 10/30/2017 with prescribing provider:  Idelkope   Future Office Visit:      amLODIPine (NORVASC) 10 MG tablet  Last Written Prescription Date:  3/05/18  Last Fill Quantity: 90 tablet,  # refills: 3   Last office visit: 10/30/2017 with prescribing provider:  Idelkope   Future Office Visit:        Requested Prescriptions   Pending Prescriptions Disp Refills     triamterene-HCTZ (DYAZIDE) 37.5-25 MG capsule 90 capsule 0     Sig: Take 1 capsule by mouth daily    Diuretics (Including Combos) Protocol Failed - 2/7/2019  9:06 AM       Failed - Normal serum sodium on file in past 12 months    Recent Labs   Lab Test 05/09/18  0758   *             Passed - Blood pressure under 140/90 in past 12 months    BP Readings from Last 3 Encounters:   07/13/18 132/82   05/18/18 135/85   10/30/17 132/78                Passed - Recent (12 mo) or future (30 days) visit within the authorizing provider's specialty    Patient had office visit in the last 12 months or has a visit in the next 30 days with authorizing provider or within the authorizing provider's specialty.  See \"Patient Info\" tab in inbasket, or \"Choose Columns\" in Meds & Orders section of the refill encounter.             Passed - Medication is active on med list       Passed - Patient is age 18 or older       Passed - Normal serum creatinine on file in past 12 months    Recent Labs   Lab " "Test 05/09/18  0758   CR 1.08             Passed - Normal serum potassium on file in past 12 months    Recent Labs   Lab Test 05/09/18  0758   POTASSIUM 3.6                    simvastatin (ZOCOR) 40 MG tablet 90 tablet 3     Sig: TAKE ONE TABLET BY MOUTH AT BEDTIME    Statins Protocol Failed - 2/7/2019  9:06 AM       Failed - LDL on file in past 12 months    Recent Labs   Lab Test 10/24/17  0758   LDL 68            Passed - No abnormal creatine kinase in past 12 months    No lab results found.            Passed - Recent (12 mo) or future (30 days) visit within the authorizing provider's specialty    Patient had office visit in the last 12 months or has a visit in the next 30 days with authorizing provider or within the authorizing provider's specialty.  See \"Patient Info\" tab in inbasket, or \"Choose Columns\" in Meds & Orders section of the refill encounter.             Passed - Medication is active on med list       Passed - Patient is age 18 or older        losartan (COZAAR) 50 MG tablet 90 tablet 3     Sig: Take 1 tablet (50 mg) by mouth daily    Angiotensin-II Receptors Passed - 2/7/2019  9:06 AM       Passed - Blood pressure under 140/90 in past 12 months    BP Readings from Last 3 Encounters:   07/13/18 132/82   05/18/18 135/85   10/30/17 132/78                Passed - Recent (12 mo) or future (30 days) visit within the authorizing provider's specialty    Patient had office visit in the last 12 months or has a visit in the next 30 days with authorizing provider or within the authorizing provider's specialty.  See \"Patient Info\" tab in inbasket, or \"Choose Columns\" in Meds & Orders section of the refill encounter.             Passed - Medication is active on med list       Passed - Patient is age 18 or older       Passed - Normal serum creatinine on file in past 12 months    Recent Labs   Lab Test 05/09/18  0758   CR 1.08            Passed - Normal serum potassium on file in past 12 months    Recent Labs   Lab " "Test 05/09/18  0758   POTASSIUM 3.6                    amLODIPine (NORVASC) 10 MG tablet 90 tablet 3     Sig: Take 1 tablet (10 mg) by mouth daily    Calcium Channel Blockers Protocol  Passed - 2/7/2019  9:06 AM       Passed - Blood pressure under 140/90 in past 12 months    BP Readings from Last 3 Encounters:   07/13/18 132/82   05/18/18 135/85   10/30/17 132/78                Passed - Recent (12 mo) or future (30 days) visit within the authorizing provider's specialty    Patient had office visit in the last 12 months or has a visit in the next 30 days with authorizing provider or within the authorizing provider's specialty.  See \"Patient Info\" tab in inbasket, or \"Choose Columns\" in Meds & Orders section of the refill encounter.             Passed - Medication is active on med list       Passed - Patient is age 18 or older       Passed - Normal serum creatinine on file in past 12 months    Recent Labs   Lab Test 05/09/18  0758   CR 1.08               "

## 2019-02-07 NOTE — TELEPHONE ENCOUNTER
"Routing refill request to provider for review/approval because:  Labs out of range:  Na  Labs not current:  LDL      Requested Prescriptions   Pending Prescriptions Disp Refills     triamterene-HCTZ (DYAZIDE) 37.5-25 MG capsule 90 capsule 0    Last Written Prescription Date:  10/22/2018  Last Fill Quantity: 90,  # refills: 0   Last office visit: 7/13/2018 with prescribing provider:  Shelley Roblero Office Visit:     Sig: Take 1 capsule by mouth daily    Diuretics (Including Combos) Protocol Failed - 2/7/2019  9:31 AM       Failed - Normal serum sodium on file in past 12 months    Recent Labs   Lab Test 05/09/18  0758   *             Passed - Blood pressure under 140/90 in past 12 months    BP Readings from Last 3 Encounters:   07/13/18 132/82   05/18/18 135/85   10/30/17 132/78                Passed - Recent (12 mo) or future (30 days) visit within the authorizing provider's specialty    Patient had office visit in the last 12 months or has a visit in the next 30 days with authorizing provider or within the authorizing provider's specialty.  See \"Patient Info\" tab in inbasket, or \"Choose Columns\" in Meds & Orders section of the refill encounter.             Passed - Medication is active on med list       Passed - Patient is age 18 or older       Passed - Normal serum creatinine on file in past 12 months    Recent Labs   Lab Test 05/09/18  0758   CR 1.08             Passed - Normal serum potassium on file in past 12 months    Recent Labs   Lab Test 05/09/18  0758   POTASSIUM 3.6                    simvastatin (ZOCOR) 40 MG tablet 90 tablet 3    Last Written Prescription Date:  12/31/2018  Last Fill Quantity: 90,  # refills: 3   Last office visit: 7/13/2018 with prescribing provider:  Shelley Roblreo Office Visit:     Sig: TAKE ONE TABLET BY MOUTH AT BEDTIME    Statins Protocol Failed - 2/7/2019  9:31 AM       Failed - LDL on file in past 12 months    Recent Labs   Lab Test 10/24/17  0758   LDL 68            " "Passed - No abnormal creatine kinase in past 12 months    No lab results found.            Passed - Recent (12 mo) or future (30 days) visit within the authorizing provider's specialty    Patient had office visit in the last 12 months or has a visit in the next 30 days with authorizing provider or within the authorizing provider's specialty.  See \"Patient Info\" tab in inbasket, or \"Choose Columns\" in Meds & Orders section of the refill encounter.             Passed - Medication is active on med list       Passed - Patient is age 18 or older        losartan (COZAAR) 50 MG tablet 90 tablet 3    Last Written Prescription Date:  7/23/2018  Last Fill Quantity: 90,  # refills: 3   Last office visit: 7/13/2018 with prescribing provider:  Shelley   Future Office Visit:     Sig: Take 1 tablet (50 mg) by mouth daily    Angiotensin-II Receptors Passed - 2/7/2019  9:31 AM       Passed - Blood pressure under 140/90 in past 12 months    BP Readings from Last 3 Encounters:   07/13/18 132/82   05/18/18 135/85   10/30/17 132/78                Passed - Recent (12 mo) or future (30 days) visit within the authorizing provider's specialty    Patient had office visit in the last 12 months or has a visit in the next 30 days with authorizing provider or within the authorizing provider's specialty.  See \"Patient Info\" tab in inbasket, or \"Choose Columns\" in Meds & Orders section of the refill encounter.             Passed - Medication is active on med list       Passed - Patient is age 18 or older       Passed - Normal serum creatinine on file in past 12 months    Recent Labs   Lab Test 05/09/18  0758   CR 1.08            Passed - Normal serum potassium on file in past 12 months    Recent Labs   Lab Test 05/09/18  0758   POTASSIUM 3.6                    amLODIPine (NORVASC) 10 MG tablet 90 tablet 3    Last Written Prescription Date:  3/5/2018  Last Fill Quantity: 90,  # refills: 3   Last office visit: 7/13/2018 with prescribing provider:  " "[place]   Future Office Visit:     Sig: Take 1 tablet (10 mg) by mouth daily    Calcium Channel Blockers Protocol  Passed - 2/7/2019  9:31 AM       Passed - Blood pressure under 140/90 in past 12 months    BP Readings from Last 3 Encounters:   07/13/18 132/82   05/18/18 135/85   10/30/17 132/78                Passed - Recent (12 mo) or future (30 days) visit within the authorizing provider's specialty    Patient had office visit in the last 12 months or has a visit in the next 30 days with authorizing provider or within the authorizing provider's specialty.  See \"Patient Info\" tab in inbasket, or \"Choose Columns\" in Meds & Orders section of the refill encounter.             Passed - Medication is active on med list       Passed - Patient is age 18 or older       Passed - Normal serum creatinine on file in past 12 months    Recent Labs   Lab Test 05/09/18  0758   CR 1.08               Filled per [facility] protocol-amlodipine, losartan    MARYCRUZ Coppola, RN  [employer] [facility]            "

## 2019-02-08 RX ORDER — SIMVASTATIN 40 MG
TABLET ORAL
Qty: 90 TABLET | Refills: 1 | Status: SHIPPED | OUTPATIENT
Start: 2019-02-08 | End: 2019-02-19

## 2019-02-08 RX ORDER — TRIAMTERENE AND HYDROCHLOROTHIAZIDE 37.5; 25 MG/1; MG/1
1 CAPSULE ORAL DAILY
Qty: 90 CAPSULE | Refills: 1 | Status: SHIPPED | OUTPATIENT
Start: 2019-02-08 | End: 2019-02-19

## 2019-02-19 ENCOUNTER — OFFICE VISIT (OUTPATIENT)
Dept: FAMILY MEDICINE | Facility: CLINIC | Age: 69
End: 2019-02-19
Payer: COMMERCIAL

## 2019-02-19 VITALS
SYSTOLIC BLOOD PRESSURE: 135 MMHG | DIASTOLIC BLOOD PRESSURE: 78 MMHG | TEMPERATURE: 97.3 F | HEART RATE: 62 BPM | OXYGEN SATURATION: 97 % | HEIGHT: 69 IN | WEIGHT: 205 LBS | BODY MASS INDEX: 30.36 KG/M2

## 2019-02-19 DIAGNOSIS — I10 ESSENTIAL HYPERTENSION WITH GOAL BLOOD PRESSURE LESS THAN 140/90: ICD-10-CM

## 2019-02-19 DIAGNOSIS — Z00.00 ROUTINE GENERAL MEDICAL EXAMINATION AT A HEALTH CARE FACILITY: Primary | ICD-10-CM

## 2019-02-19 DIAGNOSIS — K22.70 BARRETT'S ESOPHAGUS WITHOUT DYSPLASIA: ICD-10-CM

## 2019-02-19 DIAGNOSIS — I10 BENIGN ESSENTIAL HYPERTENSION: ICD-10-CM

## 2019-02-19 DIAGNOSIS — E78.5 HYPERLIPIDEMIA LDL GOAL <130: ICD-10-CM

## 2019-02-19 DIAGNOSIS — K21.00 GASTROESOPHAGEAL REFLUX DISEASE WITH ESOPHAGITIS: ICD-10-CM

## 2019-02-19 DIAGNOSIS — R07.9 CHEST PAIN, UNSPECIFIED TYPE: ICD-10-CM

## 2019-02-19 DIAGNOSIS — I10 ESSENTIAL HYPERTENSION: ICD-10-CM

## 2019-02-19 LAB
ALBUMIN SERPL-MCNC: 4.3 G/DL (ref 3.4–5)
ALP SERPL-CCNC: 98 U/L (ref 40–150)
ALT SERPL W P-5'-P-CCNC: 38 U/L (ref 0–70)
ANION GAP SERPL CALCULATED.3IONS-SCNC: 7 MMOL/L (ref 3–14)
AST SERPL W P-5'-P-CCNC: 27 U/L (ref 0–45)
BILIRUB SERPL-MCNC: 0.6 MG/DL (ref 0.2–1.3)
BUN SERPL-MCNC: 15 MG/DL (ref 7–30)
CALCIUM SERPL-MCNC: 9.3 MG/DL (ref 8.5–10.1)
CHLORIDE SERPL-SCNC: 106 MMOL/L (ref 94–109)
CHOLEST SERPL-MCNC: 162 MG/DL
CO2 SERPL-SCNC: 27 MMOL/L (ref 20–32)
CREAT SERPL-MCNC: 1.19 MG/DL (ref 0.66–1.25)
ERYTHROCYTE [DISTWIDTH] IN BLOOD BY AUTOMATED COUNT: 13.9 % (ref 10–15)
GFR SERPL CREATININE-BSD FRML MDRD: 63 ML/MIN/{1.73_M2}
GLUCOSE SERPL-MCNC: 114 MG/DL (ref 70–99)
HBA1C MFR BLD: 5.7 % (ref 0–5.6)
HCT VFR BLD AUTO: 46.4 % (ref 40–53)
HDLC SERPL-MCNC: 37 MG/DL
HGB BLD-MCNC: 16 G/DL (ref 13.3–17.7)
LDLC SERPL CALC-MCNC: 94 MG/DL
MCH RBC QN AUTO: 29.8 PG (ref 26.5–33)
MCHC RBC AUTO-ENTMCNC: 34.5 G/DL (ref 31.5–36.5)
MCV RBC AUTO: 86 FL (ref 78–100)
NONHDLC SERPL-MCNC: 125 MG/DL
PLATELET # BLD AUTO: 247 10E9/L (ref 150–450)
POTASSIUM SERPL-SCNC: 3.5 MMOL/L (ref 3.4–5.3)
PROT SERPL-MCNC: 7.8 G/DL (ref 6.8–8.8)
PSA SERPL-ACNC: 3.46 UG/L (ref 0–4)
RBC # BLD AUTO: 5.37 10E12/L (ref 4.4–5.9)
SODIUM SERPL-SCNC: 140 MMOL/L (ref 133–144)
TRIGL SERPL-MCNC: 157 MG/DL
WBC # BLD AUTO: 5.9 10E9/L (ref 4–11)

## 2019-02-19 PROCEDURE — 80053 COMPREHEN METABOLIC PANEL: CPT | Performed by: INTERNAL MEDICINE

## 2019-02-19 PROCEDURE — 99397 PER PM REEVAL EST PAT 65+ YR: CPT | Performed by: INTERNAL MEDICINE

## 2019-02-19 PROCEDURE — 80061 LIPID PANEL: CPT | Performed by: INTERNAL MEDICINE

## 2019-02-19 PROCEDURE — 99213 OFFICE O/P EST LOW 20 MIN: CPT | Mod: 25 | Performed by: INTERNAL MEDICINE

## 2019-02-19 PROCEDURE — 36415 COLL VENOUS BLD VENIPUNCTURE: CPT | Performed by: INTERNAL MEDICINE

## 2019-02-19 PROCEDURE — 85027 COMPLETE CBC AUTOMATED: CPT | Performed by: INTERNAL MEDICINE

## 2019-02-19 PROCEDURE — G0103 PSA SCREENING: HCPCS | Performed by: INTERNAL MEDICINE

## 2019-02-19 PROCEDURE — 83036 HEMOGLOBIN GLYCOSYLATED A1C: CPT | Performed by: INTERNAL MEDICINE

## 2019-02-19 RX ORDER — TRIAMTERENE AND HYDROCHLOROTHIAZIDE 37.5; 25 MG/1; MG/1
1 CAPSULE ORAL DAILY
Qty: 90 CAPSULE | Refills: 3 | Status: SHIPPED | OUTPATIENT
Start: 2019-02-19 | End: 2020-02-21

## 2019-02-19 RX ORDER — TADALAFIL 10 MG/1
5-10 TABLET ORAL DAILY PRN
Qty: 12 TABLET | Refills: 11 | Status: CANCELLED | OUTPATIENT
Start: 2019-02-19

## 2019-02-19 RX ORDER — LOSARTAN POTASSIUM 50 MG/1
50 TABLET ORAL DAILY
Qty: 90 TABLET | Refills: 3 | Status: SHIPPED | OUTPATIENT
Start: 2019-02-19 | End: 2020-02-21

## 2019-02-19 RX ORDER — AMLODIPINE BESYLATE 10 MG/1
10 TABLET ORAL DAILY
Qty: 90 TABLET | Refills: 3 | Status: SHIPPED | OUTPATIENT
Start: 2019-02-19 | End: 2020-02-21

## 2019-02-19 RX ORDER — SIMVASTATIN 40 MG
TABLET ORAL
Qty: 90 TABLET | Refills: 3 | Status: SHIPPED | OUTPATIENT
Start: 2019-02-19 | End: 2020-02-21

## 2019-02-19 RX ORDER — PANTOPRAZOLE SODIUM 40 MG/1
40 TABLET, DELAYED RELEASE ORAL DAILY
Qty: 90 TABLET | Refills: 3 | Status: CANCELLED | OUTPATIENT
Start: 2019-02-19

## 2019-02-19 ASSESSMENT — MIFFLIN-ST. JEOR: SCORE: 1695.25

## 2019-02-19 NOTE — PATIENT INSTRUCTIONS
Preventive Health Recommendations:     See your health care provider every year to    Review health changes.     Discuss preventive care.      Review your medicines if your doctor has prescribed any.    Talk with your health care provider about whether you should have a test to screen for prostate cancer (PSA).    Every 3 years, have a diabetes test (fasting glucose). If you are at risk for diabetes, you should have this test more often.    Every 5 years, have a cholesterol test. Have this test more often if you are at risk for high cholesterol or heart disease.     Every 10 years, have a colonoscopy. Or, have a yearly FIT test (stool test). These exams will check for colon cancer.    Talk to with your health care provider about screening for Abdominal Aortic Aneurysm if you have a family history of AAA or have a history of smoking.  Shots:     Get a flu shot each year.     Get a tetanus shot every 10 years.     Talk to your doctor about your pneumonia vaccines. There are now two you should receive - Pneumovax (PPSV 23) and Prevnar (PCV 13).    Talk to your pharmacist about a shingles vaccine.     Talk to your doctor about the hepatitis B vaccine.  Nutrition:     Eat at least 5 servings of fruits and vegetables each day.     Eat whole-grain bread, whole-wheat pasta and brown rice instead of white grains and rice.     Get adequate Calcium and Vitamin D.   Lifestyle    Exercise for at least 150 minutes a week (30 minutes a day, 5 days a week). This will help you control your weight and prevent disease.     Limit alcohol to one drink per day.     No smoking.     Wear sunscreen to prevent skin cancer.     See your dentist every six months for an exam and cleaning.     See your eye doctor every 1 to 2 years to screen for conditions such as glaucoma, macular degeneration and cataracts.    Personalized Prevention Plan  You are due for the preventive services outlined below.  Your care team is available to assist you in  scheduling these services.  If you have already completed any of these items, please share that information with your care team to update in your medical record.    Health Maintenance Due   Topic Date Due     AORTIC ANEURYSM SCREENING (SYSTEM ASSIGNED)  12/03/2016     Zoster (Shingles) Vaccine (2 of 3) 05/05/2017     Discuss Advance Directive Planning  07/30/2017     Cholesterol Lab - every 6 months  04/24/2018     Comprehensive Metabolic Lab - yearly  10/24/2018     Complete Blood Count Every Year  10/24/2018     Basic Metabolic Lab - every 6 months  11/09/2018     (Z00.00) Routine general medical examination at a health care facility  (primary encounter diagnosis)  Comment: For routine exam, we will draw labs as ordered, cholesterol, diabetes mellitus check, liver function, renal function, PSA and refer for colonoscopy.  We will also update vaccination history.  Shingrix vaccine is now available.  I would call your insurance to see if a shingles vaccine is covered and get this at your pharmacy   Plan: CBC with platelets, Lipid panel reflex to         direct LDL Fasting, Comprehensive metabolic         panel, Prostate spec antigen screen, Hemoglobin        A1c            (K22.70) Boogie's esophagus without dysplasia  Comment: repeat upper endoscopy in 1 year  Plan:             (K21.0) Gastroesophageal reflux disease with esophagitis  Comment: Doing well with omeprazole 20 mg daily  Plan:     (E78.5) Hyperlipidemia LDL goal <130  Comment:  Check fasting lipid panel today  Plan: simvastatin (ZOCOR) 40 MG tablet, Lipid panel         reflex to direct LDL Fasting            (I10) Benign essential hypertension  Comment: blood pressure is excellent today  Plan: losartan (COZAAR) 50 MG tablet            (I10) Essential hypertension with goal blood pressure less than 140/90  Comment: as above   Plan: amLODIPine (NORVASC) 10 MG tablet,         triamterene-HCTZ (DYAZIDE) 37.5-25 MG capsule        Chest pain  Comment;  recommend a repeat exercise stress echocardiogram - [place] [facility] - [phone]

## 2019-02-19 NOTE — PROGRESS NOTES
"  SUBJECTIVE:   Yoan Baltazar is a 68 year old male who presents for Preventive Visit.      Are you in the first 12 months of your Medicare Part B coverage?  No    Physical Health:    In general, how would you rate your overall physical health? excellent    Outside of work, how many days during the week do you exercise? 4-5 days/week    Outside of work, approximately how many minutes a day do you exercise?45-60 minutes    If you drink alcohol do you typically have >3 drinks per day or >7 drinks per week? Not Applicable    Do you usually eat at least 4 servings of fruit and vegetables a day, include whole grains & fiber and avoid regularly eating high fat or \"junk\" foods? NO, 2-3 servings    Do you have any problems taking medications regularly?  No    Do you have any side effects from medications? none    Needs assistance for the following daily activities: no assistance needed    Which of the following safety concerns are present in your home?  lack of grab bars in the bathroom     Hearing impairment: No    In the past 6 months, have you been bothered by leaking of urine? no    Mental Health:    In general, how would you rate your overall mental or emotional health? excellent  PHQ-2 Score:      Do you feel safe in your environment? Yes    Do you have a Health Care Directive? Yes: Advance Directive has been received and scanned.    Additional concerns to address?  No    Fall risk:  Fallen 2 or more times in the past year?: No  Any fall with injury in the past year?: No  click delete button to remove this line now  Cognitive Screenin) Repeat 3 items (Leader, Season, Table)    2) Clock draw: NORMAL  3) 3 item recall: Recalls 3 objects  Results: 3 items recalled: COGNITIVE IMPAIRMENT LESS LIKELY    Mini-CogTM Copyright SAYDA Coulter. Licensed by the author for use in [facility]; reprinted with permission (zofia@.[email or web address]). All rights reserved.      Do you have sleep apnea, excessive snoring or daytime " drowsiness?: no      Reviewed and updated as needed this visit by clinical staff         Reviewed and updated as needed this visit by Provider        Social History     Tobacco Use     Smoking status: Never Smoker     Smokeless tobacco: Never Used   Substance Use Topics     Alcohol use: Yes     Alcohol/week: 0.0 oz     Comment: maybe 1 glass of wine a month                           Current providers sharing in care for this patient include:   Patient Care Team:  Angus Llanes MD as PCP - General (Internal Medicine)  Angus Llanes MD as PCP - Assigned PCP  Yoan Oates MD as MD (Orthopedics)    The following health maintenance items are reviewed in Epic and correct as of today:  Health Maintenance   Topic Date Due     AORTIC ANEURYSM SCREENING (SYSTEM ASSIGNED)  12/03/2016     ZOSTER IMMUNIZATION (2 of 3) 05/05/2017     ADVANCE DIRECTIVE PLANNING Q5 YRS  07/30/2017     LIPID MONITORING Q6 MO  04/24/2018     CMP Q1 YR  10/24/2018     CBC Q1 YR  10/24/2018     BMP Q6 MOS  11/09/2018     FALL RISK ASSESSMENT  07/13/2019     PHQ-2 Q1 YR  07/13/2019     DTAP/TDAP/TD IMMUNIZATION (3 - Td) 08/29/2022     COLONOSCOPY Q5 YR  12/12/2023     INFLUENZA VACCINE  Completed     PNEUMOCOCCAL IMMUNIZATION 65+ LOW/MEDIUM RISK  Completed     HEPATITIS C SCREENING  Completed     IPV IMMUNIZATION  Aged Out     MENINGITIS IMMUNIZATION  Aged Out     Labs reviewed in EPIC    Chest pain   He has had occasional episodes of left-sided chest pain, nonradiating that occurred at rest.  He has been playing tennis up to 2 hours/day without symptoms, but has occasional concerns for chest pain with exertion.    ROS:  Constitutional, HEENT, cardiovascular, pulmonary, GI, , musculoskeletal, neuro, skin, endocrine and psych systems are negative, except as otherwise noted.    OBJECTIVE:   There were no vitals taken for this visit. Estimated body mass index is 28.8 kg/m  as calculated from the following:    Height as of  "7/20/18: 1.753 m (5' 9\").    Weight as of 7/20/18: 88.5 kg (195 lb).  EXAM:   GENERAL: healthy, alert and no distress  EYES: Eyes grossly normal to inspection, PERRL and conjunctivae and sclerae normal  HENT: ear canals and TM's normal, nose and mouth without ulcers or lesions  NECK: no adenopathy, no asymmetry, masses, or scars and thyroid normal to palpation  RESP: lungs clear to auscultation - no rales, rhonchi or wheezes  CV: regular rate and rhythm, normal S1 S2, no S3 or S4, no murmur, click or rub, no peripheral edema and peripheral pulses strong  : prostate normal size, no nodules  ABDOMEN: soft, nontender, no hepatosplenomegaly, no masses and bowel sounds normal  MS: no gross musculoskeletal defects noted, no edema  SKIN: no suspicious lesions or rashes  NEURO: Normal strength and tone, mentation intact and speech normal  PSYCH: mentation appears normal, affect normal/bright    Diagnostic Test Results:  none     ASSESSMENT / PLAN:   (Z00.00) Routine general medical examination at a health care facility  (primary encounter diagnosis)  Comment: For routine exam, we will draw labs as ordered, cholesterol, diabetes mellitus check, liver function, renal function, PSA and refer for colonoscopy.  We will also update vaccination history.  Shingrix vaccine is now available.  I would call your insurance to see if a shingles vaccine is covered and get this at your pharmacy   Plan: CBC with platelets, Lipid panel reflex to         direct LDL Fasting, Comprehensive metabolic         panel, Prostate spec antigen screen, Hemoglobin        A1c            (K22.70) Boogie's esophagus without dysplasia  Comment: repeat upper endoscopy in 1 year  Plan:             (K21.0) Gastroesophageal reflux disease with esophagitis  Comment: Doing well with omeprazole 20 mg daily  Plan:     (E78.5) Hyperlipidemia LDL goal <130  Comment:  Check fasting lipid panel today  Plan: simvastatin (ZOCOR) 40 MG tablet, Lipid panel         reflex " "to direct LDL Fasting            (I10) Benign essential hypertension  Comment: blood pressure is excellent today  Plan: losartan (COZAAR) 50 MG tablet            (I10) Essential hypertension with goal blood pressure less than 140/90  Comment: as above   Plan: amLODIPine (NORVASC) 10 MG tablet,         triamterene-HCTZ (DYAZIDE) 37.5-25 MG capsule        Chest pain  Comment; recommend a repeat exercise stress echocardiogram - [facility] - [phone]        End of Life Planning:  Patient currently has an advanced directive: Yes.  Practitioner is supportive of decision.    COUNSELING:  Reviewed preventive health counseling, as reflected in patient instructions    BP Readings from Last 1 Encounters:   07/13/18 132/82     Estimated body mass index is 28.8 kg/m  as calculated from the following:    Height as of 7/20/18: 1.753 m (5' 9\").    Weight as of 7/20/18: 88.5 kg (195 lb).         reports that  has never smoked. he has never used smokeless tobacco.      Appropriate preventive services were discussed with this patient, including applicable screening as appropriate for cardiovascular disease, diabetes, osteopenia/osteoporosis, and glaucoma.  As appropriate for age/gender, discussed screening for colorectal cancer, prostate cancer, breast cancer, and cervical cancer. Checklist reviewing preventive services available has been given to the patient.    Reviewed patients plan of care and provided an AVS. The Basic Care Plan (routine screening as documented in Health Maintenance) for Yoan meets the Care Plan requirement. This Care Plan has been established and reviewed with the Patient.    Counseling Resources:  ATP IV Guidelines  Pooled Cohorts Equation Calculator  Breast Cancer Risk Calculator  FRAX Risk Assessment  ICSI Preventive Guidelines  Dietary Guidelines for Americans, 2010  [employer]'s MyPlate  ASA Prophylaxis  Lung CA Screening    Angus Llanes MD, MD  [facility]  "

## 2019-02-20 NOTE — RESULT ENCOUNTER NOTE
Conor Milton,    I had the opportunity to review your recent labs and a summary of your labs reads as follows:    Your complete blood counts show no sign of anemia, normal white blood cell count and platelets.  Your comprehensive metabolic panel showed normal renal function, normal liver function, and stable fasting blood glucose indicating no evidence of diabetes mellitus.  Your A1c also does not show any evidence of diabetes  Your fasting lipid panel show  - stable HDL (good) cholesterol -as your goal is greater than 40  - low LDL (bad) cholesterol as your goal is less than 130  - stable triglyceride levels  Your PSA level is stable indicating no evidence of prostate cancer       Sincerely,  Angus Llanes MD

## 2019-03-12 ENCOUNTER — HOSPITAL ENCOUNTER (OUTPATIENT)
Dept: CARDIOLOGY | Facility: CLINIC | Age: 69
Discharge: HOME | End: 2019-03-12
Attending: INTERNAL MEDICINE | Admitting: INTERNAL MEDICINE
Payer: COMMERCIAL

## 2019-03-12 DIAGNOSIS — R07.9 CHEST PAIN, UNSPECIFIED TYPE: ICD-10-CM

## 2019-03-12 PROCEDURE — 40000264 ECHO STRESS ECHOCARDIOGRAM

## 2019-03-12 PROCEDURE — 93016 CV STRESS TEST SUPVJ ONLY: CPT | Performed by: INTERNAL MEDICINE

## 2019-03-12 PROCEDURE — 93325 DOPPLER ECHO COLOR FLOW MAPG: CPT | Mod: 26 | Performed by: INTERNAL MEDICINE

## 2019-03-12 PROCEDURE — 93321 DOPPLER ECHO F-UP/LMTD STD: CPT | Mod: 26 | Performed by: INTERNAL MEDICINE

## 2019-03-12 PROCEDURE — 93018 CV STRESS TEST I&R ONLY: CPT | Performed by: INTERNAL MEDICINE

## 2019-03-12 PROCEDURE — 25500064 ZZH RX 255 OP 636: Performed by: INTERNAL MEDICINE

## 2019-03-12 PROCEDURE — 93350 STRESS TTE ONLY: CPT | Mod: 26 | Performed by: INTERNAL MEDICINE

## 2019-03-12 RX ADMIN — HUMAN ALBUMIN MICROSPHERES AND PERFLUTREN 9 ML: 10; .22 INJECTION, SOLUTION INTRAVENOUS at 10:45

## 2019-03-15 NOTE — RESULT ENCOUNTER NOTE
Conor Milton,    I have had the opportunity to review your recent results and an interpretation is as follows:  Congratulaions on your excellent results      Sincerely,  Angus Llanes MD

## 2019-07-15 ENCOUNTER — OFFICE VISIT (OUTPATIENT)
Dept: FAMILY MEDICINE | Facility: CLINIC | Age: 69
End: 2019-07-15
Payer: COMMERCIAL

## 2019-07-15 VITALS — SYSTOLIC BLOOD PRESSURE: 133 MMHG | DIASTOLIC BLOOD PRESSURE: 89 MMHG

## 2019-07-15 DIAGNOSIS — M79.675 PAIN OF TOE OF LEFT FOOT: ICD-10-CM

## 2019-07-15 DIAGNOSIS — M20.41 HAMMERTOE OF RIGHT FOOT: Primary | ICD-10-CM

## 2019-07-15 PROCEDURE — 99213 OFFICE O/P EST LOW 20 MIN: CPT | Performed by: INTERNAL MEDICINE

## 2019-07-15 NOTE — PROGRESS NOTES
Subjective     Yoan Baltazar is a 68 year old male who presents to clinic today for the following health issues:    HPI   Wart    Patient present today for removal of wart on bottom of his left foot near great toe. He started to notice it within the past week. It is painful. He has a hx of warts but has not had any in years. Notes that he does not go barefoot outside but does go barefoot at the gym locker room.     Yoan also wonders if he is developing a callus on his second toe.     Patient Active Problem List   Diagnosis     Hyperlipidemia LDL goal <130     HTN (hypertension)     Advanced directives, counseling/discussion     Obesity     GERD (gastroesophageal reflux disease)     Glucose intolerance (pre-diabetes)     Overweight (BMI 25.0-29.9)     ED (erectile dysfunction)     OA (osteoarthritis) of knee     S/P total knee arthroplasty     Triggering of digit     S/P knee replacement     Knee pain     Aftercare following joint replacement     Knee joint replacement by other means     Boogie's esophagus without dysplasia     CKD (chronic kidney disease) stage 3, GFR 30-59 ml/min (H)     Family history of pancreatic cancer     Renal cyst     Past Surgical History:   Procedure Laterality Date     ARTHROPLASTY KNEE Right 3/5/2015    Procedure: ARTHROPLASTY KNEE;  Surgeon: Yoan Oates MD;  Location: [facility]     ARTHROSCOPY KNEE      bialt     EXCISE GANGLION WRIST      R wrsit     OPTICAL TRACKING SYSTEM ARTHROPLASTY KNEE Left 1/12/2015    Procedure: OPTICAL TRACKING SYSTEM ARTHROPLASTY KNEE;  Surgeon: Yoan Oates MD;  Location: [facility]     SHOULDER SURGERY      bilat      tibial ostoetomy      2 on Left , R x1 -Dr Tita Isaac     TONSILLECTOMY         Social History     Tobacco Use     Smoking status: Never Smoker     Smokeless tobacco: Never Used   Substance Use Topics     Alcohol use: Yes     Alcohol/week: 0.0 oz     Comment: maybe 1 glass of wine a month     Family History   Problem Relation Age of  Onset     Hypertension Mother      Arthritis Mother         knee replacement     Arthritis Father         knee replacement     Pancreatic Cancer Sister      Prostate Cancer No family hx of          Current Outpatient Medications   Medication Sig Dispense Refill     amLODIPine (NORVASC) 10 MG tablet Take 1 tablet (10 mg) by mouth daily 90 tablet 3     aspirin 81 MG tablet Take 1 tablet (81 mg) by mouth daily 30 tablet      B Complex Vitamins (VITAMIN B COMPLEX PO) Take 1 tablet by mouth daily.       Glucosamine-Chondroitin (GLUCOSAMINE CHONDR COMPLEX PO) Take 1 capsule by mouth 2 times daily. 1500 mg       losartan (COZAAR) 50 MG tablet Take 1 tablet (50 mg) by mouth daily 90 tablet 3     Multiple Vitamin (DAILY MULTIVITAMIN PO) Take 1 tablet by mouth daily.       simvastatin (ZOCOR) 40 MG tablet TAKE ONE TABLET BY MOUTH AT BEDTIME 90 tablet 3     tadalafil (CIALIS) 10 MG tablet Take 0.5-1 tablets (5-10 mg) by mouth daily as needed for erectile dysfunction Never use with nitroglycerin, terazosin or doxazosin. 12 tablet 11     triamterene-HCTZ (DYAZIDE) 37.5-25 MG capsule Take 1 capsule by mouth daily 90 capsule 3     Allergies   Allergen Reactions     No Known Drug Allergy        Reviewed and updated as needed this visit by Provider         Review of Systems   ROS COMP: Constitutional, HEENT, cardiovascular, pulmonary, gi and gu systems are negative, except as otherwise noted.    This document serves as a record of the services and decisions personally performed and made by Shayan Ellis MD. It was created on his behalf by Allison Crockett, a trained medical scribe. The creation of this document is based on the provider's statements to the medical scribe.  Allison Crockett July 15, 2019 11:08 AM          Objective    /89   There is no height or weight on file to calculate BMI.     Physical Exam     Neck was supple without adenopathy or thyromegaly his carotids were normal without bruits  Chest clear to auscultation  "and percussion  Cardiovascular S1 and S2 are physiologic without murmurs or gallops  Abdomen bowel sounds were normal.  There is no palpable mass or organomegaly  Extremities nontender without any edema  Pulses pedal pulses are as described otherwise his pulses are bilaterally symmetrical throughout without bruits  Skin without significant abnormality  Left great toe area of tenderness overlying the PIP joint of his first toe with the appearance of scar tissue. The scar tissue was shaved and bump was removed. No sign of a wart.   Right second toe: appears that he is developing a hammer toe and there is an area on medial second toe that appears to be rubbing on great toe nail creating a callus, that was shaved.     Diagnostic Test Results:  Labs reviewed in Epic        Assessment & Plan     There are no diagnoses linked to this encounter.  1.  Painful left great toe  Upon exam, skin lesion appeared unlikely to be a wart but rather built up scare tissue from callus. Recommended using non-medicated corn pads and a band-aid to prevent further callus formation. Also recommended bracing toes   2 hammitzele  Recommended using a nonmedicated corn cushion with a Band-Aid splint on the DIP     BMI:   Estimated body mass index is 30.27 kg/m  as calculated from the following:    Height as of 2/19/19: 1.753 m (5' 9\").    Weight as of 2/19/19: 93 kg (205 lb).   Weight management plan: Discussed healthy diet and exercise guidelines    FUTURE APPOINTMENTS:       - Follow-up visit in if symptoms worsen or do not improve    The information in this document, created by the medical scribe for me, accurately reflects the services I personally performed and the decisions made by me. I have reviewed and approved this document for accuracy prior to leaving the patient care area.  July 15, 2019 11:22 AM    Shayan Ellis MD  [facility]    "

## 2019-10-03 ENCOUNTER — HEALTH MAINTENANCE LETTER (OUTPATIENT)
Age: 69
End: 2019-10-03

## 2020-01-08 ENCOUNTER — TRANSFERRED RECORDS (OUTPATIENT)
Dept: HEALTH INFORMATION MANAGEMENT | Facility: CLINIC | Age: 70
End: 2020-01-08

## 2020-02-21 ENCOUNTER — OFFICE VISIT (OUTPATIENT)
Dept: FAMILY MEDICINE | Facility: CLINIC | Age: 70
End: 2020-02-21
Payer: COMMERCIAL

## 2020-02-21 VITALS
TEMPERATURE: 97.2 F | HEIGHT: 69 IN | OXYGEN SATURATION: 98 % | DIASTOLIC BLOOD PRESSURE: 79 MMHG | SYSTOLIC BLOOD PRESSURE: 136 MMHG | BODY MASS INDEX: 28.88 KG/M2 | WEIGHT: 195 LBS | HEART RATE: 59 BPM

## 2020-02-21 DIAGNOSIS — Z00.00 ROUTINE GENERAL MEDICAL EXAMINATION AT A HEALTH CARE FACILITY: Primary | ICD-10-CM

## 2020-02-21 DIAGNOSIS — K22.70 BARRETT'S ESOPHAGUS WITHOUT DYSPLASIA: ICD-10-CM

## 2020-02-21 DIAGNOSIS — N28.1 RENAL CYST: ICD-10-CM

## 2020-02-21 DIAGNOSIS — E78.5 HYPERLIPIDEMIA LDL GOAL <130: ICD-10-CM

## 2020-02-21 DIAGNOSIS — I10 ESSENTIAL HYPERTENSION WITH GOAL BLOOD PRESSURE LESS THAN 140/90: ICD-10-CM

## 2020-02-21 DIAGNOSIS — R73.01 IFG (IMPAIRED FASTING GLUCOSE): ICD-10-CM

## 2020-02-21 DIAGNOSIS — R39.89 HARD, FIRM PROSTATE: ICD-10-CM

## 2020-02-21 DIAGNOSIS — I10 BENIGN ESSENTIAL HYPERTENSION: ICD-10-CM

## 2020-02-21 LAB
ALBUMIN SERPL-MCNC: 4 G/DL (ref 3.4–5)
ALP SERPL-CCNC: 84 U/L (ref 40–150)
ALT SERPL W P-5'-P-CCNC: 33 U/L (ref 0–70)
ANION GAP SERPL CALCULATED.3IONS-SCNC: 4 MMOL/L (ref 3–14)
AST SERPL W P-5'-P-CCNC: 18 U/L (ref 0–45)
BILIRUB SERPL-MCNC: 0.5 MG/DL (ref 0.2–1.3)
BUN SERPL-MCNC: 18 MG/DL (ref 7–30)
CALCIUM SERPL-MCNC: 9.2 MG/DL (ref 8.5–10.1)
CHLORIDE SERPL-SCNC: 107 MMOL/L (ref 94–109)
CHOLEST SERPL-MCNC: 142 MG/DL
CO2 SERPL-SCNC: 28 MMOL/L (ref 20–32)
CREAT SERPL-MCNC: 1.19 MG/DL (ref 0.66–1.25)
ERYTHROCYTE [DISTWIDTH] IN BLOOD BY AUTOMATED COUNT: 14.3 % (ref 10–15)
GFR SERPL CREATININE-BSD FRML MDRD: 62 ML/MIN/{1.73_M2}
GLUCOSE SERPL-MCNC: 103 MG/DL (ref 70–99)
HBA1C MFR BLD: 5.7 % (ref 0–5.6)
HCT VFR BLD AUTO: 45.9 % (ref 40–53)
HDLC SERPL-MCNC: 33 MG/DL
HGB BLD-MCNC: 15.6 G/DL (ref 13.3–17.7)
LDLC SERPL CALC-MCNC: 82 MG/DL
MCH RBC QN AUTO: 30.2 PG (ref 26.5–33)
MCHC RBC AUTO-ENTMCNC: 34 G/DL (ref 31.5–36.5)
MCV RBC AUTO: 89 FL (ref 78–100)
NONHDLC SERPL-MCNC: 109 MG/DL
PLATELET # BLD AUTO: 239 10E9/L (ref 150–450)
POTASSIUM SERPL-SCNC: 3.4 MMOL/L (ref 3.4–5.3)
PROT SERPL-MCNC: 7.6 G/DL (ref 6.8–8.8)
RBC # BLD AUTO: 5.17 10E12/L (ref 4.4–5.9)
SODIUM SERPL-SCNC: 139 MMOL/L (ref 133–144)
TRIGL SERPL-MCNC: 133 MG/DL
WBC # BLD AUTO: 5.6 10E9/L (ref 4–11)

## 2020-02-21 PROCEDURE — 83036 HEMOGLOBIN GLYCOSYLATED A1C: CPT | Performed by: INTERNAL MEDICINE

## 2020-02-21 PROCEDURE — 80061 LIPID PANEL: CPT | Performed by: INTERNAL MEDICINE

## 2020-02-21 PROCEDURE — 99213 OFFICE O/P EST LOW 20 MIN: CPT | Mod: 25 | Performed by: INTERNAL MEDICINE

## 2020-02-21 PROCEDURE — 85027 COMPLETE CBC AUTOMATED: CPT | Performed by: INTERNAL MEDICINE

## 2020-02-21 PROCEDURE — 99397 PER PM REEVAL EST PAT 65+ YR: CPT | Performed by: INTERNAL MEDICINE

## 2020-02-21 PROCEDURE — 80053 COMPREHEN METABOLIC PANEL: CPT | Performed by: INTERNAL MEDICINE

## 2020-02-21 PROCEDURE — 36415 COLL VENOUS BLD VENIPUNCTURE: CPT | Performed by: INTERNAL MEDICINE

## 2020-02-21 RX ORDER — SIMVASTATIN 40 MG
TABLET ORAL
Qty: 90 TABLET | Refills: 3 | Status: SHIPPED | OUTPATIENT
Start: 2020-02-21 | End: 2020-12-07

## 2020-02-21 RX ORDER — TRIAMTERENE AND HYDROCHLOROTHIAZIDE 37.5; 25 MG/1; MG/1
1 CAPSULE ORAL DAILY
Qty: 90 CAPSULE | Refills: 3 | Status: SHIPPED | OUTPATIENT
Start: 2020-02-21 | End: 2020-12-07

## 2020-02-21 RX ORDER — AMLODIPINE BESYLATE 10 MG/1
10 TABLET ORAL DAILY
Qty: 90 TABLET | Refills: 3 | Status: SHIPPED | OUTPATIENT
Start: 2020-02-21 | End: 2020-12-07

## 2020-02-21 RX ORDER — LOSARTAN POTASSIUM 50 MG/1
50 TABLET ORAL DAILY
Qty: 90 TABLET | Refills: 3 | Status: SHIPPED | OUTPATIENT
Start: 2020-02-21 | End: 2020-12-07

## 2020-02-21 RX ORDER — NICOTINE POLACRILEX 4 MG/1
20 GUM, CHEWING ORAL DAILY
Qty: 90 TABLET | Refills: 3 | COMMUNITY
Start: 2020-02-21

## 2020-02-21 ASSESSMENT — ACTIVITIES OF DAILY LIVING (ADL): CURRENT_FUNCTION: NO ASSISTANCE NEEDED

## 2020-02-21 ASSESSMENT — MIFFLIN-ST. JEOR: SCORE: 1640.12

## 2020-02-21 NOTE — PROGRESS NOTES
"SUBJECTIVE:   Yoan Baltazar is a 69 year old male who presents for Preventive Visit.    Are you in the first 12 months of your Medicare coverage?  No    Healthy Habits:     In general, how would you rate your overall health?  Excellent    Frequency of exercise:  4-5 days/week    Duration of exercise:  Greater than 60 minutes    Do you usually eat at least 4 servings of fruit and vegetables a day, include whole grains    & fiber and avoid regularly eating high fat or \"junk\" foods?  Yes    Taking medications regularly:  Yes    Barriers to taking medications:  None    Ability to successfully perform activities of daily living:  No assistance needed    Home Safety:  Lack of grab bars in the bathroom    Hearing Impairment:  No hearing concerns    In the past 6 months, have you been bothered by leaking of urine?  No    In general, how would you rate your overall mental or emotional health?  Excellent      PHQ-2 Total Score: 0    Additional concerns today:  No    Do you feel safe in your environment? Yes    Have you ever done Advance Care Planning? (For example, a Health Directive, POLST, or a discussion with a medical provider or your loved ones about your wishes): Yes, advance care planning is on file.      Fall risk  Fallen 2 or more times in the past year?: No  Any fall with injury in the past year?: No    Cognitive Screening   1) Repeat 3 items (Leader, Season, Table)    2) Clock draw: NORMAL  3) 3 item recall: Recalls 3 objects  Results: 3 items recalled: COGNITIVE IMPAIRMENT LESS LIKELY    Mini-CogTM Copyright SAYDA Coulter. Licensed by the author for use in [facility]; reprinted with permission ([email or web address]). All rights reserved.      Do you have sleep apnea, excessive snoring or daytime drowsiness?: no    Reviewed and updated as needed this visit by clinical staff         Reviewed and updated as needed this visit by Provider        Social History     Tobacco Use     Smoking status: Never Smoker     " "Smokeless tobacco: Never Used   Substance Use Topics     Alcohol use: Yes     Alcohol/week: 0.0 standard drinks     Comment: maybe 1 glass of wine a month     If you drink alcohol do you typically have >3 drinks per day or >7 drinks per week? No    Alcohol Use 10/30/2017   Prescreen: >3 drinks/day or >7 drinks/week? The patient does not drink >3 drinks per day nor >7 drinks per week.         PROBLEMS TO ADD ON...    Current providers sharing in care for this patient include:   Patient Care Team:  Angus Llanes MD as PCP - General (Internal Medicine)  Angus Llanes MD as Assigned PCP  Yoan Oates MD as MD (Orthopedics)    The following health maintenance items are reviewed in Epic and correct as of today:  Health Maintenance   Topic Date Due     AORTIC ANEURYSM SCREENING (SYSTEM ASSIGNED)  12/03/2016     ZOSTER IMMUNIZATION (2 of 3) 05/05/2017     ADVANCE CARE PLANNING  07/30/2017     BMP  08/19/2019     LIPID  08/19/2019     INFLUENZA VACCINE (1) 09/01/2019     PHQ-2  01/01/2020     CMP  02/19/2020     FALL RISK ASSESSMENT  02/19/2020     CBC  02/19/2020     MEDICARE ANNUAL WELLNESS VISIT  02/19/2020     DTAP/TDAP/TD IMMUNIZATION (3 - Td) 08/29/2022     COLONOSCOPY  12/12/2023     HEPATITIS C SCREENING  Completed     PNEUMOCOCCAL IMMUNIZATION 65+ LOW/MEDIUM RISK  Completed     IPV IMMUNIZATION  Aged Out     MENINGITIS IMMUNIZATION  Aged Out     Lab work is in process  Labs reviewed in EPIC    Review of Systems  Constitutional, HEENT, cardiovascular, pulmonary, GI, , musculoskeletal, neuro, skin, endocrine and psych systems are negative, except as otherwise noted.    OBJECTIVE:   /79 (BP Location: Right arm, Patient Position: Sitting, Cuff Size: Adult Large)   Pulse 59   Temp 97.2  F (36.2  C) (Oral)   Ht 1.745 m (5' 8.7\")   Wt 88.5 kg (195 lb)   SpO2 98%   BMI 29.05 kg/m   Estimated body mass index is 29.05 kg/m  as calculated from the following:    Height as of this " "encounter: 1.745 m (5' 8.7\").    Weight as of this encounter: 88.5 kg (195 lb).  Physical Exam  GENERAL: healthy, alert and no distress  EYES: Eyes grossly normal to inspection, PERRL and conjunctivae and sclerae normal  HENT: ear canals and TM's normal, nose and mouth without ulcers or lesions  NECK: no adenopathy, no asymmetry, masses, or scars and thyroid normal to palpation  RESP: lungs clear to auscultation - no rales, rhonchi or wheezes  CV: regular rate and rhythm, normal S1 S2, no S3 or S4, no murmur, click or rub, no peripheral edema and peripheral pulses strong  ABDOMEN: soft, nontender, no hepatosplenomegaly, no masses and bowel sounds normal  RECTUM: noted evidence of prostate growth and nodule   MS: no gross musculoskeletal defects noted, no edema  SKIN: no suspicious lesions or rashes  NEURO: Normal strength and tone, mentation intact and speech normal  PSYCH: mentation appears normal, affect normal/bright    Diagnostic Test Results:  Labs reviewed in Epic    ASSESSMENT / PLAN:     Patient Instructions   (Z00.00) Routine general medical examination at a health care facility  (primary encounter diagnosis)  Comment: For routine exam, we will draw labs as ordered, cholesterol, diabetes mellitus check, liver function, renal function, PSA. We will also update vaccination history. Shingrix vaccine is now available.  I would call your insurance to see if a shingles vaccine is covered and get this at your pharmacy   Plan: Lipid panel reflex to direct LDL Fasting,         COMPREHENSIVE METABOLIC PANEL, CBC with         Platelets              (I10) Essential hypertension with goal blood pressure less than 140/90  Comment: blood pressure is stable, but slightly elevated.  We will recheck again before you leave  Plan: amLODIPine 10 MG PO tablet, triamterene-HCTZ         37.5-25 MG PO capsule            (I10) Benign essential hypertension  Comment: as above   Plan: losartan 50 MG PO tablet            (E78.5) " "Hyperlipidemia LDL goal <130  Comment: doing well with simvastatin   Plan: simvastatin 40 MG PO tablet            (R73.01) IFG (impaired fasting glucose)  Comment: check hemoglobin A1c tody  Plan: Hemoglobin Hemoglobin A1c      Boogie's Esophagus  Comment: Continue omeprazole 20 mg daily             Renal cyst  Comment: recommend follow up renal ultrasound [facility] phone #[phone]     Prostate firmness  Comment; Recommend check PSA today and consult in urology        COUNSELING:  Reviewed preventive health counseling, as reflected in patient instructions    Estimated body mass index is 30.27 kg/m  as calculated from the following:    Height as of 2/19/19: 1.753 m (5' 9\").    Weight as of 2/19/19: 93 kg (205 lb).         reports that he has never smoked. He has never used smokeless tobacco.      Appropriate preventive services were discussed with this patient, including applicable screening as appropriate for cardiovascular disease, diabetes, osteopenia/osteoporosis, and glaucoma.  As appropriate for age/gender, discussed screening for colorectal cancer, prostate cancer, breast cancer, and cervical cancer. Checklist reviewing preventive services available has been given to the patient.    Reviewed patients plan of care and provided an AVS. The Basic Care Plan (routine screening as documented in Health Maintenance) for Yoan meets the Care Plan requirement. This Care Plan has been established and reviewed with the Patient.    Counseling Resources:  ATP IV Guidelines  Pooled Cohorts Equation Calculator  Breast Cancer Risk Calculator  FRAX Risk Assessment  ICSI Preventive Guidelines  Dietary Guidelines for Americans, 2010  [employer]'s MyPlate  ASA Prophylaxis  Lung CA Screening    Angus Llanes MD, MD  [facility]    Identified Health Risks:  "

## 2020-02-21 NOTE — PATIENT INSTRUCTIONS
(Z00.00) Routine general medical examination at a health care facility  (primary encounter diagnosis)  Comment: For routine exam, we will draw labs as ordered, cholesterol, diabetes mellitus check, liver function, renal function, PSA. We will also update vaccination history. Shingrix vaccine is now available.  I would call your insurance to see if a shingles vaccine is covered and get this at your pharmacy   Plan: Lipid panel reflex to direct LDL Fasting,         COMPREHENSIVE METABOLIC PANEL, CBC with         Platelets              (I10) Essential hypertension with goal blood pressure less than 140/90  Comment: blood pressure is stable, but slightly elevated.  We will recheck again before you leave  Plan: amLODIPine 10 MG PO tablet, triamterene-HCTZ         37.5-25 MG PO capsule            (I10) Benign essential hypertension  Comment: as above   Plan: losartan 50 MG PO tablet            (E78.5) Hyperlipidemia LDL goal <130  Comment: doing well with simvastatin   Plan: simvastatin 40 MG PO tablet            (R73.01) IFG (impaired fasting glucose)  Comment: check hemoglobin A1c tody  Plan: Hemoglobin Hemoglobin A1c      Boogie's Esophagus  Comment: Continue omeprazole 20 mg daily             Renal cyst  Comment: recommend follow up renal ultrasound [facility] phone #[phone]     Prostate firmness  Comment; Recommend check PSA today and consult in urology

## 2020-02-23 ENCOUNTER — TELEPHONE (OUTPATIENT)
Dept: FAMILY MEDICINE | Facility: CLINIC | Age: 70
End: 2020-02-23

## 2020-02-23 DIAGNOSIS — Z12.5 SCREENING FOR PROSTATE CANCER: Primary | ICD-10-CM

## 2020-02-23 NOTE — RESULT ENCOUNTER NOTE
Conor Milton,    I had the opportunity to review your recent labs and a summary of your labs reads as follows:    Your complete blood counts show no sign of anemia, normal white blood cell count and platelets.  Your comprehensive metabolic panel showed normal renal function, normal liver function, and stable fasting blood glucose indicating no evidence of diabetes mellitus.  Your hemoglobin A1c is also low indicating good average blood glucose control  Your fasting lipid panel show  - stable HDL (good) cholesterol -as your goal is greater than 40  - low LDL (bad) cholesterol as your goal is less than 100  - normal triglyceride levels    Congratulaions on your excellent results       Sincerely,  Angus Llanes MD

## 2020-02-23 NOTE — TELEPHONE ENCOUNTER
Can we call Yoan Baltazar and let him know that     It appears that the PSA was not drawn and I would recommend that we have him return for recheck of this.    Angus Llanes MD, MD

## 2020-02-24 ENCOUNTER — HOSPITAL ENCOUNTER (OUTPATIENT)
Dept: ULTRASOUND IMAGING | Facility: CLINIC | Age: 70
Discharge: HOME | End: 2020-02-24
Attending: INTERNAL MEDICINE | Admitting: INTERNAL MEDICINE
Payer: COMMERCIAL

## 2020-02-24 DIAGNOSIS — N28.1 RENAL CYST: ICD-10-CM

## 2020-02-24 PROCEDURE — 76770 US EXAM ABDO BACK WALL COMP: CPT

## 2020-02-24 NOTE — RESULT ENCOUNTER NOTE
Conor Milton,    I have had the opportunity to review your recent results and an interpretation is as follows:  Your follow-up ultrasound shows a stable renal cyst and no additional follow-up is needed    Sincerely,  Angus Llanes MD

## 2020-02-24 NOTE — TELEPHONE ENCOUNTER
Spoke with patient and relayed message and set up lab appt for this weds.  Fern Yee CMA on 2/24/2020 at 8:32 AM

## 2020-02-26 DIAGNOSIS — Z12.5 SCREENING FOR PROSTATE CANCER: ICD-10-CM

## 2020-02-26 LAB — PSA SERPL-ACNC: 3.56 UG/L (ref 0–4)

## 2020-02-26 PROCEDURE — G0103 PSA SCREENING: HCPCS | Performed by: INTERNAL MEDICINE

## 2020-02-26 PROCEDURE — 36415 COLL VENOUS BLD VENIPUNCTURE: CPT | Performed by: INTERNAL MEDICINE

## 2020-03-01 NOTE — RESULT ENCOUNTER NOTE
Conor Milton,    I have had the opportunity to review your recent results and an interpretation is as follows:  Your PSA level is stable indicating no evidence of prostate cancer, but I would still recommend follow up in urology     Sincerely,  Angus Llanes MD

## 2020-03-10 ENCOUNTER — OFFICE VISIT (OUTPATIENT)
Dept: UROLOGY | Facility: CLINIC | Age: 70
End: 2020-03-10
Attending: INTERNAL MEDICINE
Payer: COMMERCIAL

## 2020-03-10 VITALS
BODY MASS INDEX: 28.88 KG/M2 | HEIGHT: 69 IN | DIASTOLIC BLOOD PRESSURE: 74 MMHG | WEIGHT: 195 LBS | HEART RATE: 66 BPM | SYSTOLIC BLOOD PRESSURE: 128 MMHG | OXYGEN SATURATION: 97 %

## 2020-03-10 DIAGNOSIS — R39.89 HARD, FIRM PROSTATE: Primary | ICD-10-CM

## 2020-03-10 PROCEDURE — 99204 OFFICE O/P NEW MOD 45 MIN: CPT | Performed by: PHYSICIAN ASSISTANT

## 2020-03-10 ASSESSMENT — PAIN SCALES - GENERAL: PAINLEVEL: NO PAIN (0)

## 2020-03-10 ASSESSMENT — MIFFLIN-ST. JEOR: SCORE: 1644.89

## 2020-03-10 NOTE — LETTER
"3/10/2020       RE: Yoan Baltazar  [address] [facility] [id number]     Dear Colleague,    Thank you for referring your patient, Yoan Baltazar, to the [facility] at [facility]. Please see a copy of my visit note below.    CC: firm prostate    HPI:  Yoan Baltazar is a pleasant 69 year old male who presents in consultation from Dr. Llanes for evaluation of the above. Noted to have a firm prostate on annual exam. PSA 3.56 (3.46). Father had prostate \"removed\" in his 60s. No urinary symptoms. No gross hematuria. No hematospermia.      Has a patented inhalation patch for clinical grade aroma therapy, used in most [facility].      Past Medical History:   Diagnosis Date     Glucose intolerance (pre-diabetes)      HTN (hypertension)      Hyperlipidaemia      Overweight (BMI 25.0-29.9)      Seasonal allergies     s/p allergy shots       Past Surgical History:   Procedure Laterality Date     ARTHROPLASTY KNEE Right 3/5/2015    Procedure: ARTHROPLASTY KNEE;  Surgeon: Yoan Oates MD;  [facility]     ARTHROSCOPY KNEE      bialt     EXCISE GANGLION WRIST      R wrsit     OPTICAL TRACKING SYSTEM ARTHROPLASTY KNEE Left 1/12/2015    Procedure: OPTICAL TRACKING SYSTEM ARTHROPLASTY KNEE;  Surgeon: Yoan Oates MD;  [facility]     SHOULDER SURGERY      bilat      tibial ostoetomy      2 on Left , R x1 -Dr [place]varado[place]     TONSILLECTOMY       VASECTOMY         Social History     Socioeconomic History     Marital status:      Spouse name: Not on file     Number of children: Not on file     Years of education: Not on file     Highest education level: Not on file   Occupational History     Not on file   Social Needs     Financial resource strain: Not on file     Food insecurity     Worry: Not on file     Inability: Not on file     Transportation needs     Medical: Not on file     Non-medical: Not on file   Tobacco " Use     Smoking status: Never Smoker     Smokeless tobacco: Never Used   Substance and Sexual Activity     Alcohol use: Yes     Alcohol/week: 0.0 standard drinks     Comment: maybe 1 glass of wine a month     Drug use: No     Sexual activity: Yes     Partners: Female   Lifestyle     Physical activity     Days per week: Not on file     Minutes per session: Not on file     Stress: Not on file   Relationships     Social connections     Talks on phone: Not on file     Gets together: Not on file     Attends Restorationist service: Not on file     Active member of club or organization: Not on file     Attends meetings of clubs or organizations: Not on file     Relationship status: Not on file     Intimate partner violence     Fear of current or ex partner: Not on file     Emotionally abused: Not on file     Physically abused: Not on file     Forced sexual activity: Not on file   Other Topics Concern     Parent/sibling w/ CABG, MI or angioplasty before 65F 55M? Not Asked   Social History Narrative    7/2012Married- Children- 2Work- own business [employer]    Tobacco-noETOH- <1/ monthExercise- lift wts , starting to exercise--bike       Family History   Problem Relation Age of Onset     Hypertension Mother      Arthritis Mother         knee replacement     Arthritis Father         knee replacement     Pancreatic Cancer Sister      Prostate Cancer No family hx of        ROS:14 point ROS neg other than the symptoms noted above in the HPI.    Allergies   Allergen Reactions     No Known Drug Allergy        Current Outpatient Medications   Medication     amLODIPine 10 MG PO tablet     aspirin 81 MG tablet     B Complex Vitamins (VITAMIN B COMPLEX PO)     Glucosamine-Chondroitin (GLUCOSAMINE CHONDR COMPLEX PO)     losartan 50 MG PO tablet     Multiple Vitamin (DAILY MULTIVITAMIN PO)     omeprazole 20 MG PO tablet     simvastatin 40 MG PO tablet     tadalafil (CIALIS) 10 MG tablet     triamterene-HCTZ 37.5-25 MG PO capsule     No current  "facility-administered medications for this visit.          PEx:   Blood pressure 128/74, pulse 66, height 1.753 m (5' 9\"), weight 88.5 kg (195 lb), SpO2 97 %.    PSYCH: NAD  EYES: EOMI  MOUTH: MMM  NECK: Supple, no notable adenopathy  RESP: Unlabored breathing  CARDIAC: No LE edema  SKIN: Warm, no rashes  ABD: soft, Nontender  NEURO: AAO x3  JOHN: normal tone, no masses, (medium) very firm prostate without nodules or tenderness.      A/P: Yoan Baltazar is a 69 year old male with family history of prostate cancer, firm prostate.  -Discussed with Dr. Richards and reviewed with pt indication for T3 MRI of the prostate to rule out prostate cancer undetectable by PSA (given prostate cancer family history).   -Follow up with Dr. Richards to review results    Martha Pedraza PA-C  [facility]    20 minutes were spent with the patient today, > 50% in counseling and coordination of care                  "

## 2020-03-10 NOTE — PROGRESS NOTES
"CC: firm prostate    HPI:  Yoan Baltazar is a pleasant 69 year old male who presents in consultation from Dr. Llanes for evaluation of the above. Noted to have a firm prostate on annual exam. PSA 3.56 (3.46). Father had prostate \"removed\" in his 60s. No urinary symptoms. No gross hematuria. No hematospermia.      Has a patented inhalation patch for clinical grade aroma therapy, used in most [facility].      Past Medical History:   Diagnosis Date     Glucose intolerance (pre-diabetes)      HTN (hypertension)      Hyperlipidaemia      Overweight (BMI 25.0-29.9)      Seasonal allergies     s/p allergy shots       Past Surgical History:   Procedure Laterality Date     ARTHROPLASTY KNEE Right 3/5/2015    Procedure: ARTHROPLASTY KNEE;  Surgeon: Yoan Oates MD;  [facility]     ARTHROSCOPY KNEE      bialt     EXCISE GANGLION WRIST      R wrsit     OPTICAL TRACKING SYSTEM ARTHROPLASTY KNEE Left 1/12/2015    Procedure: OPTICAL TRACKING SYSTEM ARTHROPLASTY KNEE;  Surgeon: Yoan Oates MD;  [facility]     SHOULDER SURGERY      bilat      tibial ostoetomy      2 on Left , R x1 -Dr [place]     TONSILLECTOMY       VASECTOMY         Social History     Socioeconomic History     Marital status:      Spouse name: Not on file     Number of children: Not on file     Years of education: Not on file     Highest education level: Not on file   Occupational History     Not on file   Social Needs     Financial resource strain: Not on file     Food insecurity     Worry: Not on file     Inability: Not on file     Transportation needs     Medical: Not on file     Non-medical: Not on file   Tobacco Use     Smoking status: Never Smoker     Smokeless tobacco: Never Used   Substance and Sexual Activity     Alcohol use: Yes     Alcohol/week: 0.0 standard drinks     Comment: maybe 1 glass of wine a month     Drug use: No     Sexual activity: Yes     Partners: Female   Lifestyle     Physical activity     Days " "per week: Not on file     Minutes per session: Not on file     Stress: Not on file   Relationships     Social connections     Talks on phone: Not on file     Gets together: Not on file     Attends Druze service: Not on file     Active member of club or organization: Not on file     Attends meetings of clubs or organizations: Not on file     Relationship status: Not on file     Intimate partner violence     Fear of current or ex partner: Not on file     Emotionally abused: Not on file     Physically abused: Not on file     Forced sexual activity: Not on file   Other Topics Concern     Parent/sibling w/ CABG, MI or angioplasty before 65F 55M? Not Asked   Social History Narrative    7/2012Married- Children- 2Work- own business [employer]    Tobacco-noETOH- <1/ monthExercise- lift wts , starting to exercise--bike       Family History   Problem Relation Age of Onset     Hypertension Mother      Arthritis Mother         knee replacement     Arthritis Father         knee replacement     Pancreatic Cancer Sister      Prostate Cancer No family hx of        ROS:14 point ROS neg other than the symptoms noted above in the HPI.    Allergies   Allergen Reactions     No Known Drug Allergy        Current Outpatient Medications   Medication     amLODIPine 10 MG PO tablet     aspirin 81 MG tablet     B Complex Vitamins (VITAMIN B COMPLEX PO)     Glucosamine-Chondroitin (GLUCOSAMINE CHONDR COMPLEX PO)     losartan 50 MG PO tablet     Multiple Vitamin (DAILY MULTIVITAMIN PO)     omeprazole 20 MG PO tablet     simvastatin 40 MG PO tablet     tadalafil (CIALIS) 10 MG tablet     triamterene-HCTZ 37.5-25 MG PO capsule     No current facility-administered medications for this visit.          PEx:   Blood pressure 128/74, pulse 66, height 1.753 m (5' 9\"), weight 88.5 kg (195 lb), SpO2 97 %.    PSYCH: NAD  EYES: EOMI  MOUTH: MMM  NECK: Supple, no notable adenopathy  RESP: Unlabored breathing  CARDIAC: No LE edema  SKIN: Warm, no rashes  ABD: " soft, Nontender  NEURO: AAO x3  JOHN: normal tone, no masses, (medium) very firm prostate without nodules or tenderness.      A/P: Yoan Baltazar is a 69 year old male with family history of prostate cancer, firm prostate.  -Discussed with Dr. Richards and reviewed with pt indication for T3 MRI of the prostate to rule out prostate cancer undetectable by PSA (given prostate cancer family history).   -Follow up with Dr. Richards to review results    Marhta Pedraza PA-C  [facility]    20 minutes were spent with the patient today, > 50% in counseling and coordination of care

## 2020-03-10 NOTE — NURSING NOTE
Chief Complaint   Patient presents with     Firm Prostate     Patient here today for follow after Visit with Mario Alberto       Patient here today because of Exam of Prostate felt Firm  Follow up with Urology.      RUBIN Loza

## 2020-04-02 ENCOUNTER — ANCILLARY PROCEDURE (OUTPATIENT)
Dept: MRI IMAGING | Facility: CLINIC | Age: 70
End: 2020-04-02
Attending: PHYSICIAN ASSISTANT
Payer: COMMERCIAL

## 2020-04-02 DIAGNOSIS — R39.89 HARD, FIRM PROSTATE: ICD-10-CM

## 2020-04-02 RX ORDER — GADOBUTROL 604.72 MG/ML
10 INJECTION INTRAVENOUS ONCE
Status: COMPLETED | OUTPATIENT
Start: 2020-04-02 | End: 2020-04-02

## 2020-04-02 RX ADMIN — GADOBUTROL 8.5 ML: 604.72 INJECTION INTRAVENOUS at 10:33

## 2020-04-16 ENCOUNTER — VIRTUAL VISIT (OUTPATIENT)
Dept: UROLOGY | Facility: CLINIC | Age: 70
End: 2020-04-16
Payer: COMMERCIAL

## 2020-04-16 VITALS — WEIGHT: 195 LBS | HEIGHT: 69 IN | BODY MASS INDEX: 28.88 KG/M2

## 2020-04-16 DIAGNOSIS — Z80.42 FAMILY HISTORY OF PROSTATE CANCER IN FATHER: ICD-10-CM

## 2020-04-16 DIAGNOSIS — R39.89 HARD, FIRM PROSTATE: Primary | ICD-10-CM

## 2020-04-16 PROCEDURE — 99214 OFFICE O/P EST MOD 30 MIN: CPT | Mod: 95 | Performed by: UROLOGY

## 2020-04-16 ASSESSMENT — MIFFLIN-ST. JEOR: SCORE: 1644.89

## 2020-04-16 ASSESSMENT — PAIN SCALES - GENERAL: PAINLEVEL: NO PAIN (0)

## 2020-04-16 NOTE — PROGRESS NOTES
"Yoan Baltazar is a 69 year old male who is being evaluated via a billable telephone visit.      The patient has been notified of following:     \"This telephone visit will be conducted via a call between you and your physician/provider. We have found that certain health care needs can be provided without the need for a physical exam.  This service lets us provide the care you need with a short phone conversation.  If a prescription is necessary we can send it directly to your pharmacy.  If lab work is needed we can place an order for that and you can then stop by our lab to have the test done at a later time.    Telephone visits are billed at different rates depending on your insurance coverage. During this emergency period, for some insurers they may be billed the same as an in-person visit.  Please reach out to your insurance provider with any questions.    If during the course of the call the physician/provider feels a telephone visit is not appropriate, you will not be charged for this service.\"    Patient has given verbal consent for Telephone visit?  Yes    How would you like to obtain your AVS? MyChart                  "

## 2020-04-16 NOTE — NURSING NOTE
Chief Complaint   Patient presents with     Follow Up     Discuss MRI results.      Mary Royal, CMA

## 2020-04-16 NOTE — PROGRESS NOTES
It is a pleasure to have the opportunity to have a phone consultation today with this pleasant 69-year-old gentleman who I am communicating with for the first time.  He has been seen by my colleague Martha Pedraza in the recent past but not been seen by urologist prior to this time  He does have mild nocturia and some mild slowing of the urine stream, has not observed gross hematuria.  There is however a history of urinary tract infection in 2018 with a significant growth of E. coli at that time.  More recently during the course of a routine physical examination with his personal physician some firmness of the prostate had been identified.  The PSA was noted at 3.56.  Previous PSAs were as follows.  Results for CARLOS STEVENSON (MRN [id number]) as of 4/16/2020 09:14   Ref. Range 1/22/2009 00:00 1/27/2010 00:00 5/2/2011 00:00 2/19/2019 09:15 2/26/2020 08:51   PSA Latest Ref Range: 0 - 4 ug/L 1.25 1.29 1.38 3.46 3.56   I note from the previous PSAs that the overall PSA velocity over the last 11 years is slow.  The patient thinks that his father may have had prostate cancer as he did have prostate surgery though he is not sure whether this was related to cancer of the prostate.  The patient's general health is stable although he is a little overweight has had previous knee surgery on both knees including bilateral knee replacement and is being  treated for both hypertension and elevation of cholesterol.  There is a family history of pancreatic cancer.    Past Medical History:   Diagnosis Date     Glucose intolerance (pre-diabetes)      HTN (hypertension)      Hyperlipidaemia      Overweight (BMI 25.0-29.9)      Seasonal allergies     s/p allergy shots     Past Surgical History:   Procedure Laterality Date     ARTHROPLASTY KNEE Right 3/5/2015    Procedure: ARTHROPLASTY KNEE;  Surgeon: Carlos Oates MD;  [facility]     ARTHROSCOPY KNEE      bialt     EXCISE GANGLION WRIST      R wrsit     OPTICAL TRACKING SYSTEM  ARTHROPLASTY KNEE Left 1/12/2015    Procedure: OPTICAL TRACKING SYSTEM ARTHROPLASTY KNEE;  Surgeon: Yoan Oates MD;  [facility]     SHOULDER SURGERY      bilat      tibial ostoetomy      2 on Left , R x1 -Dr [place]     TONSILLECTOMY       VASECTOMY         Current Outpatient Medications:      amLODIPine 10 MG PO tablet, Take 1 tablet (10 mg) by mouth daily, Disp: 90 tablet, Rfl: 3     aspirin 81 MG tablet, Take 1 tablet (81 mg) by mouth daily, Disp: 30 tablet, Rfl:      B Complex Vitamins (VITAMIN B COMPLEX PO), Take 1 tablet by mouth daily., Disp: , Rfl:      Glucosamine-Chondroitin (GLUCOSAMINE CHONDR COMPLEX PO), Take 1 capsule by mouth 2 times daily. 1500 mg, Disp: , Rfl:      losartan 50 MG PO tablet, Take 1 tablet (50 mg) by mouth daily, Disp: 90 tablet, Rfl: 3     Multiple Vitamin (DAILY MULTIVITAMIN PO), Take 1 tablet by mouth daily., Disp: , Rfl:      omeprazole 20 MG PO tablet, Take 1 tablet (20 mg) by mouth daily Take 30-60 minutes before a meal., Disp: 90 tablet, Rfl: 3     simvastatin 40 MG PO tablet, TAKE ONE TABLET BY MOUTH AT BEDTIME, Disp: 90 tablet, Rfl: 3     tadalafil (CIALIS) 10 MG tablet, Take 0.5-1 tablets (5-10 mg) by mouth daily as needed for erectile dysfunction Never use with nitroglycerin, terazosin or doxazosin., Disp: 12 tablet, Rfl: 11     triamterene-HCTZ 37.5-25 MG PO capsule, Take 1 capsule by mouth daily, Disp: 90 capsule, Rfl: 3     10 point ROS of systems including Constitutional, Eyes, Respiratory, Cardiovascular, Gastroenterology, Genitourinary, Integumentary, Muscularskeletal, Psychiatric were all negative except for pertinent positives noted in my HPI.    As this is a telephone consultation there is no vital signs taken.  My impression on the telephone to 3 years well oriented in time place and person and in no acute distress.  Mental status seems to be quite normal based on my impressions on the telephone.  No other examination obviously was  possible.    Impression.  We have arranged for a T3 MRI of the prostate.  MRI PROSTATE: 4/2/2020 11:03 AM     CLINICAL HISTORY: firm prostate, family hx prostate cancer; Hard, firm  prostate 4/2/2020     Most Recent PSA: 3.56 ug/L     Previously:  3.46 on 2/19/2019  1.38 on 5/2/2011     Comparison: None available.     TECHNIQUE:  The following sequences were obtained: High-resolution axial  T2-weighted, coronal T2-weighted, 3D volumetric T2-weighted, axial  pre-contrast T1, axial diffusion-weighted, axial apparent diffusion  coefficient and axial dynamic contrast-enhanced T1. Postcontrast  images were evaluated on a separate workstation to evaluate dynamic  contrast enhancement. The technique of this exam is PI-RADS v2.1  compliant. Contrast dose: 8.5 Ml Gadavist     FINDINGS:  Size: 3.3 x 4.2 x 4.4 cm. 32 grams  Hemorrhage: Absent  Peripheral zone: Homogeneously hyperintense on the right and  relatively homogeneously hypointense on the left on T2-weighted  images. Suspicious lesions as detailed below.  Transition zone: Nonenlarged. No suspicious lesions identified.     Lesion(s) in rank order of severity (highest score- to lowest score,  then by size)      Lesion 1:  Location: Left base and mid gland peripheral zone from 5 o'clock  position relative to the urethra. Series 6001 image 16.   Additional prostate regions involved: None   Size: 7 x 8 mm  T2 description: Diffuse appearing heterogeneous hypointense focus.  T2 numerical assessment: 2  DWI description: Mild hypointense on ADC and mild on high B-value DWI.  DWI numerical assessment: 3  DCE assessment: Negative    Prostate margin: Capsular abutment<6 mm with smooth contour    Lesion overall PI-RADS category: 3     Neurovascular bundles: No neurovascular bundle involvement by  malignancy.   Seminal vesicles: As seen on image 234 of series 14,001, there is a  focus of early enhancement involving the right seminal vesicle.  However, on T2-weighted sequences that  is no discrete prostatic mass  extending into right seminal vesicle.  Lymph nodes: There are 2 indeterminate subcentimeter bilateral (one on  either side) external iliac chain lymph nodes demonstrated on series  31212, image 17.  Bones: No suspicious osseous lesion. Heterotopic ossification along  the superior posterior aspect of the left greater trochanter.  Other pelvic organs: Catheter within the rectum. No additional  findings.                                                                         IMPRESSION:  1. Based on the most suspicious abnormality, this exam is  characterized as PIRADS 3 - The presence of clinically significant  cancer is equivocal.  The most suspicious abnormality is located at  the 5:00 position left prostate base. There is minimal capsular  abutment with no convincing evidence of extraprostatic extension. It  is possible that this could reflect focal prostatitis.  1a. T2 hypointensity throughout the peripheral gland in patchy  distribution, suggesting underlying prostatitis.  2. Focus of mild early enhancement in the right seminal vesicle  without a discrete mass visualized in the right prostate base  extending into this region. This could reflect focal inflammatory  process as opposed to malignancy. Noting possibility of focal  prostatitis in the left prostate base, patient may benefit from a  short-term 3-6 month follow-up MRI exam if conservative management is  desired.  3. No suspicious lymphadenopathy.  4. No other evidence of pelvic metastases.        PIRADS? v2.1 Assessment Categories   PIRADS 1: Very low (clinically significant cancer is highly unlikely  to be present)   PIRADS 2: Low (clinically significant cancer is unlikely to be  present)   PIRADS 3: Intermediate (the presence of clinically significant cancer  is equivocal)   PIRADS 4: High (clinically significant cancer is likely to be present)     PIRADS 5: Very high (clinically significant cancer is highly likely to  be  present)     I have personally reviewed the examination and initial interpretation  and I agree with the findings.     SUGAR TAVAREZ MD       The results of the MRI scan are somewhat reassuring.  This is considered PI-RADS 3 which is considered equivocal, with a weight of 32 g, but with clear indications of evidence of prostatitis in the past.  This would certainly seem to be a factor in urinary tract infection reported 2 years ago.  Certainly that chronic prostatitis can result in some fullness of the prostate gland.  We had a careful discussion therefore about how this should be followed.  At present I see no reason to consider a biopsy of the prostate and I did talk about prostate biopsy in some of the potential side effects given the invasive nature of the procedure.  We will need to keep a close eye on the PSA and a digital rectal examination I would recommend we repeat this in 3 months and if there is still evidence of concern we may need at some point to consider a biopsy.  We may also consider repeating the MRI prior to considering a biopsy.  We also had discussions today somewhat about prostate health and I did have a discussion with him about his symptoms recommending avoidance of caffeine if he is getting significant nocturia and reduction of fluids after 6 PM.  We also had discussions about dietary factors that may be beneficial for prostate health and this included reduction in red meat in the diet with more of a Mediterranean type of diet with tomatoes which contain a lot of lycopene, possibly selenium supplements and soy products.    My conclusion is at the present time I see no reason for biopsy, however I would continue close surveillance and repeat a PSA and a digital rectal examination in 6 months from now and consider any further measures such as MRI scan again or even biopsy should there be significant change would be of concern.    I went over the entire situation very carefully with the  "patient in detail today.  I answered all his questions.    Plan.  PSA and examination in 6 months    Time.  This was a telephone consultation which extended for 27 minutes.    \"This dictation was performed with voice recognition software and may contain errors,  omissions and inadvertent word substitution.\"    "

## 2020-07-27 NOTE — TELEPHONE ENCOUNTER
Prescription approved per [facility] Refill Protocol.  Candis Mccormick RN    
simvastatin (ZOCOR) 40 MG tablet     Last Written Prescription Date: 1/2/17  Last Fill Quantity: 90, # refills: 1  Last Office Visit with G, P or [facility] prescribing provider: 1/30/17       Lab Results   Component Value Date    CHOL 159 03/10/2017     Lab Results   Component Value Date    HDL 36 03/10/2017     Lab Results   Component Value Date    LDL 87 03/10/2017     Lab Results   Component Value Date    TRIG 178 03/10/2017     Lab Results   Component Value Date    CHOLHDLRATIO 3.7 06/17/2015         Justina Vasquez RT(R)    
triamterene-hydrochlorothiazide (DYAZIDE) 37.5-25 MG per capsule      Last Written Prescription Date: 1/2/17  Last Fill Quantity: 90, # refills: 1  Last Office Visit with [facility], [facility] or [facility] prescribing provider: 1/30/17       Potassium   Date Value Ref Range Status   03/10/2017 3.7 3.4 - 5.3 mmol/L Final     Creatinine   Date Value Ref Range Status   01/30/2017 1.15 0.66 - 1.25 mg/dL Final     BP Readings from Last 3 Encounters:   01/30/17 138/86   08/12/16 138/86   08/14/15 138/82             valsartan (DIOVAN) 160 MG tablet      Last Written Prescription Date: 1/2/17  Last Fill Quantity: 90, # refills: 1  Last Office Visit with [facility], [facility] or [facility] prescribing provider: 1/30/17       Potassium   Date Value Ref Range Status   03/10/2017 3.7 3.4 - 5.3 mmol/L Final     Creatinine   Date Value Ref Range Status   01/30/2017 1.15 0.66 - 1.25 mg/dL Final     BP Readings from Last 3 Encounters:   01/30/17 138/86   08/12/16 138/86   08/14/15 138/82             Justina FERREIRA(R)      
ambulatory

## 2020-09-24 ENCOUNTER — TRANSFERRED RECORDS (OUTPATIENT)
Dept: HEALTH INFORMATION MANAGEMENT | Facility: CLINIC | Age: 70
End: 2020-09-24

## 2020-10-29 ENCOUNTER — TRANSFERRED RECORDS (OUTPATIENT)
Dept: HEALTH INFORMATION MANAGEMENT | Facility: CLINIC | Age: 70
End: 2020-10-29

## 2020-11-03 ENCOUNTER — HOSPITAL ENCOUNTER (EMERGENCY)
Facility: CLINIC | Age: 70
Discharge: HOME | End: 2020-11-03
Attending: PHYSICIAN ASSISTANT | Admitting: PHYSICIAN ASSISTANT
Payer: COMMERCIAL

## 2020-11-03 VITALS
TEMPERATURE: 98.5 F | BODY MASS INDEX: 28.14 KG/M2 | HEART RATE: 85 BPM | HEIGHT: 69 IN | SYSTOLIC BLOOD PRESSURE: 168 MMHG | OXYGEN SATURATION: 97 % | DIASTOLIC BLOOD PRESSURE: 88 MMHG | WEIGHT: 190 LBS | RESPIRATION RATE: 20 BRPM

## 2020-11-03 DIAGNOSIS — S61.412A LACERATION OF LEFT HAND: ICD-10-CM

## 2020-11-03 PROCEDURE — 99283 EMERGENCY DEPT VISIT LOW MDM: CPT

## 2020-11-03 PROCEDURE — 12001 RPR S/N/AX/GEN/TRNK 2.5CM/<: CPT

## 2020-11-03 ASSESSMENT — ENCOUNTER SYMPTOMS
WEAKNESS: 0
BRUISES/BLEEDS EASILY: 0
NUMBNESS: 0
WOUND: 1

## 2020-11-03 ASSESSMENT — MIFFLIN-ST. JEOR: SCORE: 1622.21

## 2020-11-03 NOTE — ED AVS SNAPSHOT
[facility]  [address] [address]  Phone: [phone]  Fax: [phone]                                    Yoan Baltazar   MRN: [id number]    Department: [facility]   Date of Visit: 11/3/2020           After Visit Summary Signature Page    I have received my discharge instructions, and my questions have been answered. I have discussed any challenges I see with this plan with the nurse or doctor.    ..........................................................................................................................................  Patient/Patient Representative Signature      ..........................................................................................................................................  Patient Representative Print Name and Relationship to Patient    ..................................................               ................................................  Date                                   Time    ..........................................................................................................................................  Reviewed by Signature/Title    ...................................................              ..............................................  Date                                               Time          22EPIC Rev 08/18

## 2020-11-04 ENCOUNTER — TRANSFERRED RECORDS (OUTPATIENT)
Dept: HEALTH INFORMATION MANAGEMENT | Facility: CLINIC | Age: 70
End: 2020-11-04

## 2020-11-04 NOTE — ED TRIAGE NOTES
Pt was playing tennis when he fell into a bracket on the wall. He has a laceration to his left hand,and abrasion to left elbow. Unsure it tetanus is up to date. Pt hit head, denies LOC and is not taking blood thinners.

## 2020-11-04 NOTE — ED PROVIDER NOTES
"  History     Chief Complaint:  Laceration, Fall    HPI   Yoan Baltazar is a 69 year old right handed male who presents with hand laceration after a fall.  The patient reports losing his balance when he pivoted while playing tennis, causing him to stumble into a tarp at the end of the court.  He went to break his fall with his left hand and made contact with pipes on the wall.  He sustained a laceration to his palm and also an abrasion to his left elbow.  He was able to get up and ambulate on his own.  He did not hit his head or lose consciousness.  He has mild right shoulder pain but can move his arm without difficulty.  His last tetanus immunization was in 2012.    Allergies:  No known drug allergies.      Medications:    Amlodipine  Losartan   Triamterene-Hydrochlorothiazide   Aspirin  Simvastatin   Tadalafil  Omeprazole   Glucosamine-chondroitin  Vitamin B    Past Medical History:    Hypertension  Hyperlipidemia  Erectile dysfunction  Osteoarthritis  Gastrointestinal esophageal reflux disease   Boogie's esophagus  Chronic kidney disease     Past Surgical History:    Total knee arthroplasty, right   Total knee arthroplasty, left   Ganglion cyst excision  Knee arthroscopy   Shoulder surgery  Tonsillectomy   Vasectomy   Tibial osteotomy    Family History:    Hypertension - mother  Arthritis - mother, father   Pancreatic cancer - sister     Social History:  Presents to the ED alone.  Tobacco Use: No previous or current tobacco use.   Alcohol Use: Occasional alcohol use.   PCP: Angus Llanes MD      Review of Systems   Musculoskeletal: Negative for gait problem.        Positive for shoulder pain.   Skin: Positive for wound.   Neurological: Negative for weakness and numbness.   Hematological: Does not bruise/bleed easily.   All other systems reviewed and are negative.    Physical Exam   First Vitals:  BP: (!) 168/88  Pulse: 85  Temp: 98.5  F (36.9  C)  Resp: 20  Height: 175.3 cm (5' 9\")  Weight: 86.2 kg (190 " lb)  SpO2: 97 %      Physical Exam  General: Resting comfortably.  Alert and oriented.   Head:  The scalp, face, and head appear normal   Eyes:  Conjunctivae and sclerae are normal    CV:  Radial pulse intact to the left wrist.  Capillary refill is brisk in all digits of the left hand.   Resp:  No tachypnea.  No respiratory distress.  MS:  The patient can hold fingers in resisted abduction, make the okay sign and hold it against resistance, and can make the thumbs up sign.  Patient can flex and extend at the MCP, DIP, and PIP joints of the left little finger against resistance.  Skin:  2.2 cm laceration noted just proximal to the base of the left pinky on the palmar aspect of the hand.  There is no tendon involvement.  No foreign body visualized.  No bone exposure.  Neuro:  Sensation intact throughout left hand.    Emergency Department Course     Procedures:    Laceration Repair        LACERATION:  A simple clean 2.2 cm laceration.      LOCATION:  Left hand      FUNCTION:  Distally sensation, circulation, motor and tendon function are intact.      ANESTHESIA:  Local using Bupivicaine total of 2 mLs      PREPARATION:  Irrigation and Scrubbing with Normal Saline and SeaClens      DEBRIDEMENT:  no debridement and wound explored, no foreign body found      CLOSURE:  Wound was closed with One Layer.  Skin closed with 5 x 5.0 Ethylon using interrupted sutures.     Emergency Department Course:  Nursing notes and vitals reviewed.  I performed an exam of the patient as documented above. GCS 15.    I performed a laceration repair, as documented above.     Findings and plan explained to the patient. Patient discharged home with instructions regarding supportive care, medications, and reasons to return. The importance of close follow-up was reviewed.     Impression & Plan      Medical Decision Making:  Yoan Baltazar is a 69 year old male presents for evaluation of a laceration to his left hand as sustained as noted in the HPI.  After anesthesia and copious irrigation, the wound was carefully evaluated and explored. There was no foreign body identified. CMS is intact.  There is no evidence of muscular, tendon, bone, or nerve damage with this laceration. The laceration was closed as noted in the procedure note. The patient tolerated the procedure well and there were no immediate complications.  Possible complications (infection, scarring) were reviewed with the patient. Appropriate wound dressing was placed and daily cares were discussed. Tetanus is up to date. he will be discharged home. he was asked to follow up with primary care in 12 days for suture removal. Red flag symptoms, and reasons for return were discussed and understood. All questions were answered prior to discharge. The patient understands and agrees to this plan.    Diagnosis:    ICD-10-CM    1. Laceration of left hand  S61.412A        Disposition:  discharged to home    Discharge Medications:  New Prescriptions    No medications on file     Albina DE LA ROSA, am serving as a scribe on 11/3/2020 at 8:43 PM to personally document services performed by Stacy Ayala PA based on my observations and the provider's statements to me.     Albina Hess  11/3/2020   [facility] EMERGENCY DEPT       Stacy Ayala PA-C  11/03/20 2148

## 2020-11-06 ENCOUNTER — TRANSFERRED RECORDS (OUTPATIENT)
Dept: HEALTH INFORMATION MANAGEMENT | Facility: CLINIC | Age: 70
End: 2020-11-06

## 2020-11-07 ENCOUNTER — HEALTH MAINTENANCE LETTER (OUTPATIENT)
Age: 70
End: 2020-11-07

## 2020-11-09 DIAGNOSIS — Z12.5 SCREENING FOR PROSTATE CANCER: Primary | ICD-10-CM

## 2020-11-09 LAB — PSA SERPL-MCNC: 2.9 NG/ML (ref 0–4)

## 2020-11-09 PROCEDURE — G0103 PSA SCREENING: HCPCS | Performed by: UROLOGY

## 2020-11-09 PROCEDURE — 36415 COLL VENOUS BLD VENIPUNCTURE: CPT | Performed by: UROLOGY

## 2020-11-10 ENCOUNTER — VIRTUAL VISIT (OUTPATIENT)
Dept: UROLOGY | Facility: CLINIC | Age: 70
End: 2020-11-10
Payer: COMMERCIAL

## 2020-11-10 VITALS — BODY MASS INDEX: 28.14 KG/M2 | WEIGHT: 190 LBS | HEIGHT: 69 IN

## 2020-11-10 DIAGNOSIS — N40.2 PROSTATE NODULE: ICD-10-CM

## 2020-11-10 DIAGNOSIS — R97.20 ELEVATED PROSTATE SPECIFIC ANTIGEN (PSA): Primary | ICD-10-CM

## 2020-11-10 PROCEDURE — 99213 OFFICE O/P EST LOW 20 MIN: CPT | Mod: 95 | Performed by: UROLOGY

## 2020-11-10 ASSESSMENT — PAIN SCALES - GENERAL: PAINLEVEL: NO PAIN (1)

## 2020-11-10 ASSESSMENT — MIFFLIN-ST. JEOR: SCORE: 1622.21

## 2020-11-10 NOTE — LETTER
"11/10/2020       RE: Yoan Baltazar  [address] [address]     Dear Colleague,    Thank you for referring your patient, Yoan Baltazar, to the [facility] [place] [facility]. Please see a copy of my visit note below.    Yoan Baltazar is a 69 year old male who is being evaluated via a billable telephone visit.      The patient has been notified of following:     \"This telephone visit will be conducted via a call between you and your physician/provider. We have found that certain health care needs can be provided without the need for a physical exam.  This service lets us provide the care you need with a short phone conversation.  If a prescription is necessary we can send it directly to your pharmacy.  If lab work is needed we can place an order for that and you can then stop by our lab to have the test done at a later time.    Telephone visits are billed at different rates depending on your insurance coverage. During this emergency period, for some insurers they may be billed the same as an in-person visit.  Please reach out to your insurance provider with any questions.    If during the course of the call the physician/provider feels a telephone visit is not appropriate, you will not be charged for this service.\"    Patient has given verbal consent for Telephone visit?  Yes    What phone number would you like to be contacted at?  [phone]    How would you like to obtain your AVS? [place] [facility]    History: There is a great pleasure to conduct a follow-up telephone consultation with this very pleasant 69-year-old gentleman.  He does have mild nocturia and some mild slowing of the urine stream, has not observed gross hematuria.  There is however a history of urinary tract infection in 2018 with a significant growth of E. coli at that time.  More recently during the course of a routine physical examination with his personal " physician some firmness of the prostate had been identified.  The PSA was noted at 3.56.  Previous PSAs were as follows.  Results for CARLOS STEVENSON (MRN [id number]) as of 4/16/2020 09:14    Ref. Range 1/22/2009 00:00 1/27/2010 00:00 5/2/2011 00:00 2/19/2019 09:15 2/26/2020 08:51   PSA Latest Ref Range: 0 - 4 ug/L 1.25 1.29 1.38 3.46 3.56   I note from the previous PSAs that the overall PSA velocity over the last 11 years is slow.  The patient thinks that his father may have had prostate cancer as he did have prostate surgery though he is not sure whether this was related to cancer of the prostate.  However, when I examined his prostate I was concerned that the gland itself was firm and there may indeed have been a family history of prostate cancer so we arrange for a T3 MRI of the prostate.  IMPRESSION:  1. Based on the most suspicious abnormality, this exam is  characterized as PIRADS 3 - The presence of clinically significant  cancer is equivocal.  The most suspicious abnormality is located at  the 5:00 position left prostate base. There is minimal capsular  abutment with no convincing evidence of extraprostatic extension. It  is possible that this could reflect focal prostatitis.  1a. T2 hypointensity throughout the peripheral gland in patchy  distribution, suggesting underlying prostatitis.  2. Focus of mild early enhancement in the right seminal vesicle  without a discrete mass visualized in the right prostate base  extending into this region. This could reflect focal inflammatory  process as opposed to malignancy. Noting possibility of focal  prostatitis in the left prostate base, patient may benefit from a  short-term 3-6 month follow-up MRI exam if conservative management is  desired.  3. No suspicious lymphadenopathy.  4. No other evidence of pelvic metastases.     Based on this we decided to observe this given that the PSA was in the normal range, and that the MRI was considered PI-RADS 3 but changes of  concern I do significant chance of being merely prostatitis.  We therefore have repeated the PSA 6 months later    Past Medical History:   Diagnosis Date     Glucose intolerance (pre-diabetes)      HTN (hypertension)      Hyperlipidaemia      Overweight (BMI 25.0-29.9)      Seasonal allergies     s/p allergy shots       Social History     Socioeconomic History     Marital status:      Spouse name: None     Number of children: None     Years of education: None     Highest education level: None   Occupational History     None   Social Needs     Financial resource strain: None     Food insecurity     Worry: None     Inability: None     Transportation needs     Medical: None     Non-medical: None   Tobacco Use     Smoking status: Never Smoker     Smokeless tobacco: Never Used   Substance and Sexual Activity     Alcohol use: Yes     Alcohol/week: 0.0 standard drinks     Comment: maybe 1 glass of wine a month     Drug use: No     Sexual activity: Yes     Partners: Female   Lifestyle     Physical activity     Days per week: None     Minutes per session: None     Stress: None   Relationships     Social connections     Talks on phone: None     Gets together: None     Attends Gnosticist service: None     Active member of club or organization: None     Attends meetings of clubs or organizations: None     Relationship status: None     Intimate partner violence     Fear of current or ex partner: None     Emotionally abused: None     Physically abused: None     Forced sexual activity: None   Other Topics Concern     Parent/sibling w/ CABG, MI or angioplasty before 65F 55M? Not Asked   Social History Narrative    7/2012Married- Children- 2Work- own business [employer]    Tobacco-noETOH- <1/ monthExercise- lift wts , starting to exercise--bike       Past Surgical History:   Procedure Laterality Date     ARTHROPLASTY KNEE Right 3/5/2015    Procedure: ARTHROPLASTY KNEE;  Surgeon: Yoan Oates MD;  [facility]      ARTHROSCOPY KNEE      bialt     EXCISE GANGLION WRIST      R wrsit     OPTICAL TRACKING SYSTEM ARTHROPLASTY KNEE Left 1/12/2015    Procedure: OPTICAL TRACKING SYSTEM ARTHROPLASTY KNEE;  Surgeon: Yoan Oates MD;  Location: [facility]     SHOULDER SURGERY      bilat      tibial ostoetomy      2 on Left , R x1 -Dr [place]     TONSILLECTOMY       VASECTOMY         Family History   Problem Relation Age of Onset     Hypertension Mother      Arthritis Mother         knee replacement     Arthritis Father         knee replacement     Pancreatic Cancer Sister      Prostate Cancer No family hx of          Current Outpatient Medications:      amLODIPine 10 MG PO tablet, Take 1 tablet (10 mg) by mouth daily, Disp: 90 tablet, Rfl: 3     aspirin 81 MG tablet, Take 1 tablet (81 mg) by mouth daily, Disp: 30 tablet, Rfl:      B Complex Vitamins (VITAMIN B COMPLEX PO), Take 1 tablet by mouth daily., Disp: , Rfl:      Glucosamine-Chondroitin (GLUCOSAMINE CHONDR COMPLEX PO), Take 1 capsule by mouth 2 times daily. 1500 mg, Disp: , Rfl:      losartan 50 MG PO tablet, Take 1 tablet (50 mg) by mouth daily, Disp: 90 tablet, Rfl: 3     Multiple Vitamin (DAILY MULTIVITAMIN PO), Take 1 tablet by mouth daily., Disp: , Rfl:      omeprazole 20 MG PO tablet, Take 1 tablet (20 mg) by mouth daily Take 30-60 minutes before a meal., Disp: 90 tablet, Rfl: 3     simvastatin 40 MG PO tablet, TAKE ONE TABLET BY MOUTH AT BEDTIME, Disp: 90 tablet, Rfl: 3     tadalafil (CIALIS) 10 MG tablet, Take 0.5-1 tablets (5-10 mg) by mouth daily as needed for erectile dysfunction Never use with nitroglycerin, terazosin or doxazosin., Disp: 12 tablet, Rfl: 11     triamterene-HCTZ 37.5-25 MG PO capsule, Take 1 capsule by mouth daily, Disp: 90 capsule, Rfl: 3    10 point ROS of systems including Constitutional, Eyes, Respiratory, Cardiovascular, Gastroenterology, Genitourinary, Integumentary, Muscularskeletal, Psychiatric and Neurologic were all negative except for  "pertinent positives noted in my HPI.    Examination: Based on telephone impression  Ht 1.753 m (5' 9\")   Wt 86.2 kg (190 lb)   BMI 28.06 kg/m    General Impression: Very pleasant patient in no acute distress, well-oriented in time place and person and quite conversational  Mental Status: Normal    Impression:  Results for CARLOS STEVENSON (MRN [id number]) as of 11/10/2020 15:48   Ref. Range 1/22/2009 00:00 1/27/2010 00:00 5/2/2011 00:00 2/19/2019 09:15 2/26/2020 08:51   PSA Latest Ref Range: 0 - 4 ug/L 1.25 1.29 1.38 3.46 3.56   Results for CARLOS STEVENSON (MRN [id number]) as of 11/10/2020 15:48   Ref. Range 11/9/2020 10:37   PSA Screen Urologic Phys Latest Ref Range: 0.00 - 4.00 ng/mL 2.90     I discussed the situation with the patient in detail today.  I went over all the records in detail.  I am reassured that the PSA has declined down to 2.9.  However, given the texture of the prostate, and the MRI report which indicated a follow-up MRI would be appropriate after a reasonable interval, I explained to him that we should repeat the MRI at his next visit in 6 months as well as a PSA next examined the prostate digitally.  It is possible there could be evidence of malignancy in the prostate and if the MRI has significantly changed in 6 months and may be of more concern then he would likely need an MRI fusion biopsy.  I explained this all to him carefully today.  He seems accepting of this.  I recommend therefore we see him in 6 months.  T3 MRI of the prostate PSA and physical examination  I answered all his questions    Plan: 6 months T3 MRI prostate, PSA and physical examination            Phone call duration: 12 minutes    Chaim Richards MD      "

## 2020-11-10 NOTE — PROGRESS NOTES
"Carlos Baltazar is a 69 year old male who is being evaluated via a billable telephone visit.      The patient has been notified of following:     \"This telephone visit will be conducted via a call between you and your physician/provider. We have found that certain health care needs can be provided without the need for a physical exam.  This service lets us provide the care you need with a short phone conversation.  If a prescription is necessary we can send it directly to your pharmacy.  If lab work is needed we can place an order for that and you can then stop by our lab to have the test done at a later time.    Telephone visits are billed at different rates depending on your insurance coverage. During this emergency period, for some insurers they may be billed the same as an in-person visit.  Please reach out to your insurance provider with any questions.    If during the course of the call the physician/provider feels a telephone visit is not appropriate, you will not be charged for this service.\"    Patient has given verbal consent for Telephone visit?  Yes    What phone number would you like to be contacted at?  [phone]    How would you like to obtain your AVS? [facility]    History: There is a great pleasure to conduct a follow-up telephone consultation with this very pleasant 69-year-old gentleman.  He does have mild nocturia and some mild slowing of the urine stream, has not observed gross hematuria.  There is however a history of urinary tract infection in 2018 with a significant growth of E. coli at that time.  More recently during the course of a routine physical examination with his personal physician some firmness of the prostate had been identified.  The PSA was noted at 3.56.  Previous PSAs were as follows.  Results for CARLOS BALTAZAR (MRN [id number]) as of 4/16/2020 09:14    Ref. Range 1/22/2009 00:00 1/27/2010 00:00 5/2/2011 00:00 2/19/2019 09:15 2/26/2020 08:51   PSA Latest Ref " Range: 0 - 4 ug/L 1.25 1.29 1.38 3.46 3.56   I note from the previous PSAs that the overall PSA velocity over the last 11 years is slow.  The patient thinks that his father may have had prostate cancer as he did have prostate surgery though he is not sure whether this was related to cancer of the prostate.  However, when I examined his prostate I was concerned that the gland itself was firm and there may indeed have been a family history of prostate cancer so we arrange for a T3 MRI of the prostate.  IMPRESSION:  1. Based on the most suspicious abnormality, this exam is  characterized as PIRADS 3 - The presence of clinically significant  cancer is equivocal.  The most suspicious abnormality is located at  the 5:00 position left prostate base. There is minimal capsular  abutment with no convincing evidence of extraprostatic extension. It  is possible that this could reflect focal prostatitis.  1a. T2 hypointensity throughout the peripheral gland in patchy  distribution, suggesting underlying prostatitis.  2. Focus of mild early enhancement in the right seminal vesicle  without a discrete mass visualized in the right prostate base  extending into this region. This could reflect focal inflammatory  process as opposed to malignancy. Noting possibility of focal  prostatitis in the left prostate base, patient may benefit from a  short-term 3-6 month follow-up MRI exam if conservative management is  desired.  3. No suspicious lymphadenopathy.  4. No other evidence of pelvic metastases.     Based on this we decided to observe this given that the PSA was in the normal range, and that the MRI was considered PI-RADS 3 but changes of concern I do significant chance of being merely prostatitis.  We therefore have repeated the PSA 6 months later    Past Medical History:   Diagnosis Date     Glucose intolerance (pre-diabetes)      HTN (hypertension)      Hyperlipidaemia      Overweight (BMI 25.0-29.9)      Seasonal allergies      s/p allergy shots       Social History     Socioeconomic History     Marital status:      Spouse name: None     Number of children: None     Years of education: None     Highest education level: None   Occupational History     None   Social Needs     Financial resource strain: None     Food insecurity     Worry: None     Inability: None     Transportation needs     Medical: None     Non-medical: None   Tobacco Use     Smoking status: Never Smoker     Smokeless tobacco: Never Used   Substance and Sexual Activity     Alcohol use: Yes     Alcohol/week: 0.0 standard drinks     Comment: maybe 1 glass of wine a month     Drug use: No     Sexual activity: Yes     Partners: Female   Lifestyle     Physical activity     Days per week: None     Minutes per session: None     Stress: None   Relationships     Social connections     Talks on phone: None     Gets together: None     Attends Congregation service: None     Active member of club or organization: None     Attends meetings of clubs or organizations: None     Relationship status: None     Intimate partner violence     Fear of current or ex partner: None     Emotionally abused: None     Physically abused: None     Forced sexual activity: None   Other Topics Concern     Parent/sibling w/ CABG, MI or angioplasty before 65F 55M? Not Asked   Social History Narrative    7/2012Married- Children- 2Work- own business [employer]    Tobacco-noETOH- <1/ monthExercise- lift wts , starting to exercise--bike       Past Surgical History:   Procedure Laterality Date     ARTHROPLASTY KNEE Right 3/5/2015    Procedure: ARTHROPLASTY KNEE;  Surgeon: Yoan Oates MD;  [facility]     ARTHROSCOPY KNEE      bialt     EXCISE GANGLION WRIST      R wrsit     OPTICAL TRACKING SYSTEM ARTHROPLASTY KNEE Left 1/12/2015    Procedure: OPTICAL TRACKING SYSTEM ARTHROPLASTY KNEE;  Surgeon: Yoan Oates MD;  Location: [facility]     SHOULDER SURGERY      bilat      tibial ostoetomy      2 on Left ,  "R x1 -Dr [place]     TONSILLECTOMY       VASECTOMY         Family History   Problem Relation Age of Onset     Hypertension Mother      Arthritis Mother         knee replacement     Arthritis Father         knee replacement     Pancreatic Cancer Sister      Prostate Cancer No family hx of          Current Outpatient Medications:      amLODIPine 10 MG PO tablet, Take 1 tablet (10 mg) by mouth daily, Disp: 90 tablet, Rfl: 3     aspirin 81 MG tablet, Take 1 tablet (81 mg) by mouth daily, Disp: 30 tablet, Rfl:      B Complex Vitamins (VITAMIN B COMPLEX PO), Take 1 tablet by mouth daily., Disp: , Rfl:      Glucosamine-Chondroitin (GLUCOSAMINE CHONDR COMPLEX PO), Take 1 capsule by mouth 2 times daily. 1500 mg, Disp: , Rfl:      losartan 50 MG PO tablet, Take 1 tablet (50 mg) by mouth daily, Disp: 90 tablet, Rfl: 3     Multiple Vitamin (DAILY MULTIVITAMIN PO), Take 1 tablet by mouth daily., Disp: , Rfl:      omeprazole 20 MG PO tablet, Take 1 tablet (20 mg) by mouth daily Take 30-60 minutes before a meal., Disp: 90 tablet, Rfl: 3     simvastatin 40 MG PO tablet, TAKE ONE TABLET BY MOUTH AT BEDTIME, Disp: 90 tablet, Rfl: 3     tadalafil (CIALIS) 10 MG tablet, Take 0.5-1 tablets (5-10 mg) by mouth daily as needed for erectile dysfunction Never use with nitroglycerin, terazosin or doxazosin., Disp: 12 tablet, Rfl: 11     triamterene-HCTZ 37.5-25 MG PO capsule, Take 1 capsule by mouth daily, Disp: 90 capsule, Rfl: 3    10 point ROS of systems including Constitutional, Eyes, Respiratory, Cardiovascular, Gastroenterology, Genitourinary, Integumentary, Muscularskeletal, Psychiatric and Neurologic were all negative except for pertinent positives noted in my HPI.    Examination: Based on telephone impression  Ht 1.753 m (5' 9\")   Wt 86.2 kg (190 lb)   BMI 28.06 kg/m    General Impression: Very pleasant patient in no acute distress, well-oriented in time place and person and quite conversational  Mental Status: " Normal    Impression:  Results for CARLOS STEVENSON (MRN [id number]) as of 11/10/2020 15:48   Ref. Range 1/22/2009 00:00 1/27/2010 00:00 5/2/2011 00:00 2/19/2019 09:15 2/26/2020 08:51   PSA Latest Ref Range: 0 - 4 ug/L 1.25 1.29 1.38 3.46 3.56   Results for CARLOS STEVENSON (MRN [id number]) as of 11/10/2020 15:48   Ref. Range 11/9/2020 10:37   PSA Screen Urologic Phys Latest Ref Range: 0.00 - 4.00 ng/mL 2.90     I discussed the situation with the patient in detail today.  I went over all the records in detail.  I am reassured that the PSA has declined down to 2.9.  However, given the texture of the prostate, and the MRI report which indicated a follow-up MRI would be appropriate after a reasonable interval, I explained to him that we should repeat the MRI at his next visit in 6 months as well as a PSA next examined the prostate digitally.  It is possible there could be evidence of malignancy in the prostate and if the MRI has significantly changed in 6 months and may be of more concern then he would likely need an MRI fusion biopsy.  I explained this all to him carefully today.  He seems accepting of this.  I recommend therefore we see him in 6 months.  T3 MRI of the prostate PSA and physical examination  I answered all his questions    Plan: 6 months T3 MRI prostate, PSA and physical examination            Phone call duration: 12 minutes    Chaim Richards MD

## 2020-11-18 ENCOUNTER — TRANSFERRED RECORDS (OUTPATIENT)
Dept: HEALTH INFORMATION MANAGEMENT | Facility: CLINIC | Age: 70
End: 2020-11-18

## 2020-12-07 ENCOUNTER — MYC REFILL (OUTPATIENT)
Dept: FAMILY MEDICINE | Facility: CLINIC | Age: 70
End: 2020-12-07

## 2020-12-07 DIAGNOSIS — I10 ESSENTIAL HYPERTENSION WITH GOAL BLOOD PRESSURE LESS THAN 140/90: ICD-10-CM

## 2020-12-07 DIAGNOSIS — I10 BENIGN ESSENTIAL HYPERTENSION: ICD-10-CM

## 2020-12-07 DIAGNOSIS — E78.5 HYPERLIPIDEMIA LDL GOAL <130: ICD-10-CM

## 2020-12-09 RX ORDER — SIMVASTATIN 40 MG
TABLET ORAL
Qty: 90 TABLET | Refills: 0 | Status: SHIPPED | OUTPATIENT
Start: 2020-12-09 | End: 2021-03-30

## 2020-12-09 RX ORDER — TRIAMTERENE AND HYDROCHLOROTHIAZIDE 37.5; 25 MG/1; MG/1
1 CAPSULE ORAL DAILY
Qty: 90 CAPSULE | Refills: 0 | Status: SHIPPED | OUTPATIENT
Start: 2020-12-09 | End: 2021-02-28

## 2020-12-09 RX ORDER — LOSARTAN POTASSIUM 50 MG/1
50 TABLET ORAL DAILY
Qty: 90 TABLET | Refills: 0 | Status: SHIPPED | OUTPATIENT
Start: 2020-12-09 | End: 2021-02-28

## 2020-12-09 RX ORDER — AMLODIPINE BESYLATE 10 MG/1
10 TABLET ORAL DAILY
Qty: 90 TABLET | Refills: 0 | Status: SHIPPED | OUTPATIENT
Start: 2020-12-09 | End: 2021-03-30

## 2020-12-14 NOTE — PROGRESS NOTES
Please review, associate diagnosis, and sign pending pre physical lab orders for patient's upcoming 10/24/17 lab appointment.     Thank you,  Lab    
Signed   
awake/alert/oriented to person, place, time/situation

## 2021-02-26 DIAGNOSIS — I10 ESSENTIAL HYPERTENSION WITH GOAL BLOOD PRESSURE LESS THAN 140/90: ICD-10-CM

## 2021-02-26 DIAGNOSIS — I10 BENIGN ESSENTIAL HYPERTENSION: ICD-10-CM

## 2021-02-28 RX ORDER — LOSARTAN POTASSIUM 50 MG/1
TABLET ORAL
Qty: 90 TABLET | Refills: 0 | OUTPATIENT
Start: 2021-02-28

## 2021-02-28 RX ORDER — TRIAMTERENE AND HYDROCHLOROTHIAZIDE 37.5; 25 MG/1; MG/1
CAPSULE ORAL
Qty: 90 CAPSULE | Refills: 0 | OUTPATIENT
Start: 2021-02-28

## 2021-03-15 DIAGNOSIS — Z00.00 ROUTINE GENERAL MEDICAL EXAMINATION AT A HEALTH CARE FACILITY: ICD-10-CM

## 2021-03-15 DIAGNOSIS — Z12.5 ENCOUNTER FOR SCREENING FOR MALIGNANT NEOPLASM OF PROSTATE: ICD-10-CM

## 2021-03-15 LAB
ALBUMIN SERPL-MCNC: 4.2 G/DL (ref 3.4–5)
ALP SERPL-CCNC: 98 U/L (ref 40–150)
ALT SERPL W P-5'-P-CCNC: 37 U/L (ref 0–70)
ANION GAP SERPL CALCULATED.3IONS-SCNC: 4 MMOL/L (ref 3–14)
AST SERPL W P-5'-P-CCNC: 29 U/L (ref 0–45)
BILIRUB SERPL-MCNC: 0.5 MG/DL (ref 0.2–1.3)
BUN SERPL-MCNC: 22 MG/DL (ref 7–30)
CALCIUM SERPL-MCNC: 9.5 MG/DL (ref 8.5–10.1)
CHLORIDE SERPL-SCNC: 108 MMOL/L (ref 94–109)
CHOLEST SERPL-MCNC: 152 MG/DL
CO2 SERPL-SCNC: 30 MMOL/L (ref 20–32)
CREAT SERPL-MCNC: 1.32 MG/DL (ref 0.66–1.25)
ERYTHROCYTE [DISTWIDTH] IN BLOOD BY AUTOMATED COUNT: 13.9 % (ref 10–15)
GFR SERPL CREATININE-BSD FRML MDRD: 55 ML/MIN/{1.73_M2}
GLUCOSE SERPL-MCNC: 110 MG/DL (ref 70–99)
HBA1C MFR BLD: 5.8 % (ref 0–5.6)
HCT VFR BLD AUTO: 46 % (ref 40–53)
HDLC SERPL-MCNC: 45 MG/DL
HGB BLD-MCNC: 16 G/DL (ref 13.3–17.7)
LDLC SERPL CALC-MCNC: 88 MG/DL
MCH RBC QN AUTO: 30.1 PG (ref 26.5–33)
MCHC RBC AUTO-ENTMCNC: 34.8 G/DL (ref 31.5–36.5)
MCV RBC AUTO: 87 FL (ref 78–100)
NONHDLC SERPL-MCNC: 107 MG/DL
PLATELET # BLD AUTO: 205 10E9/L (ref 150–450)
POTASSIUM SERPL-SCNC: 3.8 MMOL/L (ref 3.4–5.3)
PROT SERPL-MCNC: 7.9 G/DL (ref 6.8–8.8)
PSA SERPL-ACNC: 3.46 UG/L (ref 0–4)
RBC # BLD AUTO: 5.31 10E12/L (ref 4.4–5.9)
SODIUM SERPL-SCNC: 142 MMOL/L (ref 133–144)
TRIGL SERPL-MCNC: 93 MG/DL
WBC # BLD AUTO: 5.6 10E9/L (ref 4–11)

## 2021-03-15 PROCEDURE — 85027 COMPLETE CBC AUTOMATED: CPT | Performed by: INTERNAL MEDICINE

## 2021-03-15 PROCEDURE — 83036 HEMOGLOBIN GLYCOSYLATED A1C: CPT | Performed by: INTERNAL MEDICINE

## 2021-03-15 PROCEDURE — G0103 PSA SCREENING: HCPCS | Performed by: INTERNAL MEDICINE

## 2021-03-15 PROCEDURE — 80061 LIPID PANEL: CPT | Performed by: INTERNAL MEDICINE

## 2021-03-15 PROCEDURE — 36415 COLL VENOUS BLD VENIPUNCTURE: CPT | Performed by: INTERNAL MEDICINE

## 2021-03-15 PROCEDURE — 80053 COMPREHEN METABOLIC PANEL: CPT | Performed by: INTERNAL MEDICINE

## 2021-03-16 NOTE — RESULT ENCOUNTER NOTE
Conor Milton,    I had the opportunity to review your recent labs and a summary of your labs reads as follows:    Your complete blood counts show no sign of anemia, normal white blood cell count and platelets.  Your comprehensive metabolic panel showed slightly worsening renal function, normal liver function, and normal fasting blood glucose indicating no evidence of diabetes mellitus.  - I would recommend that you drink more fluids and continue current blood pressure medications - we can recheck at your upcoming physical  Your hemoglobin A1c is stable indicating stable average blood glucose   Your fasting lipid panel show  - normal HDL (good) cholesterol -as your goal is greater than 40  - low LDL (bad) cholesterol as your goal is less than 130  - normal triglyceride levels  Your PSA level is also stable indicating no evidence of prostate cancer       Sincerely,  Angus Llanes MD

## 2021-03-17 ENCOUNTER — OFFICE VISIT (OUTPATIENT)
Dept: FAMILY MEDICINE | Facility: CLINIC | Age: 71
End: 2021-03-17
Payer: COMMERCIAL

## 2021-03-17 VITALS
TEMPERATURE: 97 F | BODY MASS INDEX: 28.14 KG/M2 | HEIGHT: 69 IN | WEIGHT: 190 LBS | HEART RATE: 62 BPM | SYSTOLIC BLOOD PRESSURE: 155 MMHG | OXYGEN SATURATION: 98 % | DIASTOLIC BLOOD PRESSURE: 90 MMHG

## 2021-03-17 DIAGNOSIS — Z00.00 ROUTINE GENERAL MEDICAL EXAMINATION AT A HEALTH CARE FACILITY: Primary | ICD-10-CM

## 2021-03-17 DIAGNOSIS — M79.646 THUMB PAIN, UNSPECIFIED LATERALITY: ICD-10-CM

## 2021-03-17 DIAGNOSIS — I10 ESSENTIAL HYPERTENSION: ICD-10-CM

## 2021-03-17 PROCEDURE — 80048 BASIC METABOLIC PNL TOTAL CA: CPT | Performed by: INTERNAL MEDICINE

## 2021-03-17 PROCEDURE — 99213 OFFICE O/P EST LOW 20 MIN: CPT | Mod: 25 | Performed by: INTERNAL MEDICINE

## 2021-03-17 PROCEDURE — 36415 COLL VENOUS BLD VENIPUNCTURE: CPT | Performed by: INTERNAL MEDICINE

## 2021-03-17 PROCEDURE — 99397 PER PM REEVAL EST PAT 65+ YR: CPT | Performed by: INTERNAL MEDICINE

## 2021-03-17 ASSESSMENT — ACTIVITIES OF DAILY LIVING (ADL): CURRENT_FUNCTION: NO ASSISTANCE NEEDED

## 2021-03-17 ASSESSMENT — MIFFLIN-ST. JEOR: SCORE: 1617.21

## 2021-03-17 NOTE — PATIENT INSTRUCTIONS
(Z00.00) Routine general medical examination at a health care facility  (primary encounter diagnosis)  Comment: For routine exam, we reviewed labs as ordered, cholesterol, diabetes mellitus check, liver function, renal function, PSA.  We will also update vaccination history.  Plan:     (I10) Essential hypertension  Comment: blood pressure is elevated, but may be due to NSAIDs.  We will plan to follow up in May and continue current blood pressure medications   Plan: Basic metabolic panel  (Ca, Cl, CO2, Creat,         Gluc, K, Na, BUN)            (M79.646) Thumb pain, unspecified laterality  Comment: [facility]  Plan: [facility]

## 2021-03-17 NOTE — PROGRESS NOTES
"SUBJECTIVE:   Yoan Baltazar is a 70 year old male who presents for Preventive Visit.      Patient has been advised of split billing requirements and indicates understanding: Yes   Are you in the first 12 months of your Medicare coverage?  No    Healthy Habits:    In general, how would you rate your overall health?  Excellent    Frequency of exercise:  6-7 days/week    Duration of exercise:  Greater than 60 minutes    Do you usually eat at least 4 servings of fruit and vegetables a day, include whole grains    & fiber and avoid regularly eating high fat or \"junk\" foods?  Yes    Taking medications regularly:  Yes    Barriers to taking medications:  None    Medication side effects:  None    Ability to successfully perform activities of daily living:  No assistance needed    Home Safety:  No safety concerns identified    Hearing Impairment:  No hearing concerns    In the past 6 months, have you been bothered by leaking of urine?  No    In general, how would you rate your overall mental or emotional health?  Good      PHQ-2 Total Score:    Additional concerns today:  No    Do you feel safe in your environment? Yes    Have you ever done Advance Care Planning? (For example, a Health Directive, POLST, or a discussion with a medical provider or your loved ones about your wishes): Yes, advance care planning is on file.       Fall risk       Cognitive Screening   1) Repeat 3 items (Leader, Season, Table)    2) Clock draw: NORMAL  3) 3 item recall: Recalls 3 objects  Results: 3 items recalled: COGNITIVE IMPAIRMENT LESS LIKELY    Mini-CogTM Copyright SAYDA Coulter. Licensed by the author for use in [facility]; reprinted with permission (zofia@.[facility]). All rights reserved.      Do you have sleep apnea, excessive snoring or daytime drowsiness?: no    Reviewed and updated as needed this visit by clinical staff                 Reviewed and updated as needed this visit by Provider                Social History     Tobacco Use "     Smoking status: Never Smoker     Smokeless tobacco: Never Used   Substance Use Topics     Alcohol use: Yes     Alcohol/week: 0.0 standard drinks     Comment: maybe 1 glass of wine a month     If you drink alcohol do you typically have >3 drinks per day or >7 drinks per week? No        Current providers sharing in care for this patient include:   Patient Care Team:  Angus Llanes MD as PCP - General (Internal Medicine)  Yoan Oates MD as MD (Orthopedics)  Angus Llanes MD as Assigned PCP  Chaim Richards MD as Assigned Surgical Provider    The following health maintenance items are reviewed in Epic and correct as of today:  Health Maintenance   Topic Date Due     COVID-19 Vaccine (1 of 2) Never done     ZOSTER IMMUNIZATION (2 of 3) 05/05/2017     PHQ-2  01/01/2021     FALL RISK ASSESSMENT  02/21/2021     MEDICARE ANNUAL WELLNESS VISIT  02/21/2021     BMP  09/15/2021     LIPID  09/15/2021     CMP  03/15/2022     CBC  03/15/2022     DTAP/TDAP/TD IMMUNIZATION (3 - Td) 08/29/2022     COLORECTAL CANCER SCREENING  12/12/2023     ADVANCE CARE PLANNING  02/21/2025     HEPATITIS C SCREENING  Completed     INFLUENZA VACCINE  Completed     Pneumococcal Vaccine: 65+ Years  Completed     Pneumococcal Vaccine: Pediatrics (0 to 5 Years) and At-Risk Patients (6 to 64 Years)  Aged Out     IPV IMMUNIZATION  Aged Out     MENINGITIS IMMUNIZATION  Aged Out     HEPATITIS B IMMUNIZATION  Aged Out     AORTIC ANEURYSM SCREENING (SYSTEM ASSIGNED)  Discontinued     Hypertension   Yoan Baltazar has been exercising occasionally 3 times per week  Elevated creatinine   Has been taking ibuprofen regularly.  Has pain in back, stiff knees, shoulder and arms.    Review of Systems  Constitutional, HEENT, cardiovascular, pulmonary, GI, , musculoskeletal + thumb pain, neuro, skin, endocrine and psych systems are negative, except as otherwise noted.    OBJECTIVE:   BP (!) 155/90 (BP Location: Left arm, Patient  "Position: Chair, Cuff Size: Adult Large)   Pulse 62   Temp 97  F (36.1  C) (Oral)   Ht 1.753 m (5' 9\")   Wt 86.2 kg (190 lb)   SpO2 98%   BMI 28.06 kg/m   Estimated body mass index is 28.06 kg/m  as calculated from the following:    Height as of this encounter: 1.753 m (5' 9\").    Weight as of this encounter: 86.2 kg (190 lb).  Physical Exam  GENERAL: healthy, alert and no distress  EYES: Eyes grossly normal to inspection, PERRL and conjunctivae and sclerae normal  HENT: ear canals and TM's normal, nose and mouth without ulcers or lesions  NECK: no adenopathy, no asymmetry, masses, or scars and thyroid normal to palpation  RESP: lungs clear to auscultation - no rales, rhonchi or wheezes  CV: regular rate and rhythm, normal S1 S2, no S3 or S4, no murmur, click or rub, no peripheral edema and peripheral pulses strong  ABDOMEN: soft, nontender, no hepatosplenomegaly, no masses and bowel sounds normal  MS: no gross musculoskeletal defects noted, no edema  SKIN: no suspicious lesions or rashes  NEURO: Normal strength and tone, mentation intact and speech normal  PSYCH: mentation appears normal, affect normal/bright    Diagnostic Test Results:  Labs reviewed in Epic    ASSESSMENT / PLAN:     Patient Instructions   (Z00.00) Routine general medical examination at a health care facility  (primary encounter diagnosis)  Comment: For routine exam, we reviewed labs as ordered, cholesterol, diabetes mellitus check, liver function, renal function, PSA.  We will also update vaccination history.  Plan:     (I10) Essential hypertension  Comment: blood pressure is elevated, but may be due to NSAIDs.  We will plan to follow up in May and continue current blood pressure medications   Plan: Basic metabolic panel  (Ca, Cl, CO2, Creat,         Gluc, K, Na, BUN)            (M79.646) Thumb pain, unspecified laterality  Comment: [facility]  Plan: [facility]        " "  Elevated Creatinine  Comment: Recommend avoiding NSAIDs and continue with increased fluids     Patient has been advised of split billing requirements and indicates understanding: Yes  COUNSELING:  Reviewed preventive health counseling, as reflected in patient instructions    Estimated body mass index is 28.06 kg/m  as calculated from the following:    Height as of 11/10/20: 1.753 m (5' 9\").    Weight as of 11/10/20: 86.2 kg (190 lb).        He reports that he has never smoked. He has never used smokeless tobacco.      Appropriate preventive services were discussed with this patient, including applicable screening as appropriate for cardiovascular disease, diabetes, osteopenia/osteoporosis, and glaucoma.  As appropriate for age/gender, discussed screening for colorectal cancer, prostate cancer, breast cancer, and cervical cancer. Checklist reviewing preventive services available has been given to the patient.    Reviewed patients plan of care and provided an AVS. The Basic Care Plan (routine screening as documented in Health Maintenance) for Yoan meets the Care Plan requirement. This Care Plan has been established and reviewed with the Patient.    Counseling Resources:  ATP IV Guidelines  Pooled Cohorts Equation Calculator  Breast Cancer Risk Calculator  Breast Cancer: Medication to Reduce Risk  FRAX Risk Assessment  ICSI Preventive Guidelines  Dietary Guidelines for Americans, 2010  [employer] MyPlate  ASA Prophylaxis  Lung CA Screening    Angus Llanes MD, MD  [facility] [place]    Identified Health Risks:  "

## 2021-03-18 LAB
ANION GAP SERPL CALCULATED.3IONS-SCNC: 5 MMOL/L (ref 3–14)
BUN SERPL-MCNC: 18 MG/DL (ref 7–30)
CALCIUM SERPL-MCNC: 8.8 MG/DL (ref 8.5–10.1)
CHLORIDE SERPL-SCNC: 106 MMOL/L (ref 94–109)
CO2 SERPL-SCNC: 27 MMOL/L (ref 20–32)
CREAT SERPL-MCNC: 1.24 MG/DL (ref 0.66–1.25)
GFR SERPL CREATININE-BSD FRML MDRD: 59 ML/MIN/{1.73_M2}
GLUCOSE SERPL-MCNC: 103 MG/DL (ref 70–99)
POTASSIUM SERPL-SCNC: 3.6 MMOL/L (ref 3.4–5.3)
SODIUM SERPL-SCNC: 138 MMOL/L (ref 133–144)

## 2021-03-18 NOTE — RESULT ENCOUNTER NOTE
Conor Milton,    I have had the opportunity to review your recent results and an interpretation is as follows:  Your basic metabolic panel shows stable renal function and electrolytes, but still impaired - as we discussed, I would recommend holding all NSAIDs    Sincerely,  Angus Llanes MD

## 2021-03-28 DIAGNOSIS — I10 ESSENTIAL HYPERTENSION WITH GOAL BLOOD PRESSURE LESS THAN 140/90: ICD-10-CM

## 2021-03-28 DIAGNOSIS — E78.5 HYPERLIPIDEMIA LDL GOAL <130: ICD-10-CM

## 2021-03-30 ENCOUNTER — THERAPY VISIT (OUTPATIENT)
Dept: OCCUPATIONAL THERAPY | Facility: CLINIC | Age: 71
End: 2021-03-30
Attending: INTERNAL MEDICINE
Payer: COMMERCIAL

## 2021-03-30 DIAGNOSIS — M19.90 ARTHRITIS: ICD-10-CM

## 2021-03-30 DIAGNOSIS — M79.646 THUMB PAIN, UNSPECIFIED LATERALITY: ICD-10-CM

## 2021-03-30 PROCEDURE — 97140 MANUAL THERAPY 1/> REGIONS: CPT | Mod: GO | Performed by: OCCUPATIONAL THERAPIST

## 2021-03-30 PROCEDURE — 97018 PARAFFIN BATH THERAPY: CPT | Mod: GO | Performed by: OCCUPATIONAL THERAPIST

## 2021-03-30 PROCEDURE — 97165 OT EVAL LOW COMPLEX 30 MIN: CPT | Mod: GO | Performed by: OCCUPATIONAL THERAPIST

## 2021-03-30 PROCEDURE — 97110 THERAPEUTIC EXERCISES: CPT | Mod: GO | Performed by: OCCUPATIONAL THERAPIST

## 2021-03-30 RX ORDER — AMLODIPINE BESYLATE 10 MG/1
10 TABLET ORAL DAILY
Qty: 90 TABLET | Refills: 3 | Status: SHIPPED | OUTPATIENT
Start: 2021-03-30 | End: 2021-07-16

## 2021-03-30 RX ORDER — SIMVASTATIN 40 MG
TABLET ORAL
Qty: 90 TABLET | Refills: 3 | Status: SHIPPED | OUTPATIENT
Start: 2021-03-30 | End: 2021-04-02

## 2021-03-30 NOTE — PROGRESS NOTES
Hand Therapy Initial Evaluation    Current Date:  3/30/2021  Referring Physician:Angus Llanes MD    Diagnosis: Bilateral thumb pain (left greater than right)  DOI: 3/2019     Subjective:  Yoan Baltazar is a 70 year old right hand dominant male.    Patient reports symptoms of pain, stiffness/loss of motion, weakness/loss of strength and edema of the bilateral thumbs which occurred due to an unknown etiology. Since onset symptoms are Unchanged  Special tests:  x-ray (2015 mild degenerative changes at (B) CMCs).  Previous treatment: cortisone injection (L) CMC 12/2020.    General health as reported by patient is excellent.  Pertinent medical history includes:High Blood Pressure, Implated Device  Medical allergies:none.  Surgical history: orthopedic: shoulder, wrist, knnes.  Medication history: High Blood Pressure.    Occupational Profile Information:  Current occupation is Executive  Currently working in normal job without restrictions  Job Tasks: Computer Work, Driving  Prior functional level:  no limitations  Barriers include:none  Mobility: No difficulty  Transportation: drives  Leisure activities/hobbies: tennis, cooking    Upper Extremity Functional Index Score:  SCORE:   Column Totals: /80: 76   (A lower score indicates greater disability.)    Objective:  Pain Level (Scale 0-10):   3/30/2021   At Rest 1/10   With Use (L) 1-5/10  (R) 1-2/10     Pain Description:  Date 3/30/2021   Location Bilateral thumb   Pain Quality Aching and Throbbing   Frequency intermittent     Pain is worst  daytime, morning   Exacerbated by  gripping, weight bearing   Relieved by cold, rest and Tylenol   Progression unchanged     ROM  Thumb 3/30/2021 3/30/2021   AROM  (PROM) right Left   MP /45 /35   IP /55 /58   RABD 50 35   PABD 43 40   Kapandji Opposition Scale (0-10/10) 9/10 9/10     Thumb Observation/Appearance  Key: + = present/ - = not observed    3/30/2021   Shoulder deformity present over CMC R:+  L:+   Volar  subluxation present R:+  L:+   Edema over the CMC joint R:+  L:+   Noted collapse of MP into hyperextension during pinch R:+  L:   Tenderness at CMC R:+  L:+      Provocative Tests  Pain Report:  - none    + mild    ++ moderate    +++ severe     3/30/2021   CMC Grind test R: -  L: -   Crepitus present R: -  L: +   CMC Adduction Stress Test R: -  L: +   CMC Extension Stress Test R: +  L: -   Finkelstein's R: -  L: -     Strength   (Measured in pounds)  Pain Report: - none  + mild    ++ moderate    +++ severe    3/30/2021 3/30/2021   Trials Right Left   1  2  3 82+  85  79 82  84  79   Average 82 82     Lat Pinch 3/30/2021 3/30/2021   Trials Right Left   1  2  3 21 17+   Average       3 Pt Pinch 3/30/2021 3/30/2021   Trials Right Left   1   2  3 16 19   Average       Assessment:  Patient presents with symptoms consistent with diagnosis of CMC thumb arthritis, with conservative intervention.    Patient s limitations or Problem List includes:  Pain, Decreased ROM/motion, weakness, decreased stability of the CMC joint, decreased  and pinch strength of the thumb which interferes with patients ability to perform  home maintenance and sports/recreational as compared to previous level of function.    Rehab Potential:  Good - Return to full activity, some limitations    Patient will benefit from skilled Occupational Therapy to increase overall strength and stability of thumb and decrease pain to return to previous activity level and resume normal daily tasks and to reach their rehab potential.    Barriers to Learning:  No barrier    Communication Issues:  Patient appears to be able to clearly communicate and understand verbal and written communication and follow directions correctly.    Chart Review: Brief history including review of medical and/or therapy records relating to the presenting problem and Simple history review with patient    Identified Performance Deficits: home establishment and management and leisure  activities    Assessment of Occupational Performance:  1-3 Performance Deficits    Clinical Decision Making (Complexity): Low complexity    Treatment Explanation:  The following has been discussed with the patient:  RX ordered/plan of care  Anticipated outcomes  Possible risks and side effects    Plan:  Frequency:  2 X a month, once daily  Duration:  for 3 months    Treatment Plan:  Modalities:  Paraffin  Therapeutic Exercise: AROM, Isometrics, and Stabilization exercises of the Thumb CMC, including active and resisted abduction, 1st DI strengthening  Manual Techniques: Joint Mobilization or reseating of the trapezium, self MFR to thumb adductor with clip  Orthosis fabrication:  Hand based Thumb Spica, Custom neoprene support   Education: Anatomy of CMC, joint protection principles, adaptive equipment as needed    Discharge Plan:  Achieve all LTG  [place] in home treatment program.  Reach maximal therapeutic benefit.    Home Program:  Warmth  1st web release with clip  Self CMC mobilization on chest  Place and hold pinch  Thumb Stabilization Program with hard  C  and index abd  CMC neoprene cool comfort orthosis during the day with ADL s per symptoms  Incorporate joint protection into daily functional activities  Adaptive equipment as needed.    Next Visit:  Hand based Thumb Spica orthosis night/sleeping and per symptoms during the day

## 2021-03-30 NOTE — TELEPHONE ENCOUNTER
Routing refill request to provider for review/approval because:    Medication interaction.    Please refill as appropriate.  Renee Nino RN  [facility]

## 2021-04-02 ENCOUNTER — TELEPHONE (OUTPATIENT)
Dept: FAMILY MEDICINE | Facility: CLINIC | Age: 71
End: 2021-04-02

## 2021-04-02 DIAGNOSIS — E78.5 HYPERLIPIDEMIA LDL GOAL <130: ICD-10-CM

## 2021-04-02 RX ORDER — SIMVASTATIN 40 MG
TABLET ORAL
Qty: 5 TABLET | Refills: 0 | Status: SHIPPED | OUTPATIENT
Start: 2021-04-02 | End: 2021-04-07

## 2021-04-02 NOTE — TELEPHONE ENCOUNTER
"TO PCP:     Pt called OUT of simvastatin and asked that 5 pills be sent to local [facility] - approved     ALSO - c/o high BP, and states it was just yesterday and today. Normally it's good     173/90 last night  147/80 today     Wrist cuff - brand new (will plan to bring and compare to ours at next visit)     Didn't feel himself last night and had trouble sleeping, no lightheadedness or dizziness. No headaches. NO chest pain just felt HR was elevated and BP high     Last night could just feel elevation in BP at night - it was high. Did not recall HR - was \"fast\" at the time     Heart feels good this AM     As FYI - pt doubts it's related but did have second COVID19 vaccine done 3/13    Current BP meds:   Dyazide 37.5-25mg - AM   Norvasc 10mg   Losartan 50mg      Takes one of the BP meds at night time - unsure which one     No recent missed doses on these     Usually BP is good    Plan: patient will continue monitoring readings. If any symptoms over the weekend agreed to call right away or be seen. If continues to be high agreed to call back     Edwige SMILEY RN      "

## 2021-04-07 DIAGNOSIS — E78.5 HYPERLIPIDEMIA LDL GOAL <130: ICD-10-CM

## 2021-04-07 RX ORDER — SIMVASTATIN 40 MG
TABLET ORAL
Qty: 7 TABLET | Refills: 0 | Status: SHIPPED | OUTPATIENT
Start: 2021-04-07 | End: 2021-07-16

## 2021-04-07 NOTE — TELEPHONE ENCOUNTER
Reason for Call:  Medication or medication refill:    Do you use a [facility]?  Name of the pharmacy and phone number for the current request:     [id number] [place] [address] [address] [address] [facility] [address]    Name of the medication requested: simvastatin (ZOCOR) 40 MG tablet     Other request: Pt had Rx sent to mail order but it was sent out late and now Pt won't have it for another week. Was authorized for a 5-day supply but the Rx will take an additional week to get to him. Is requesting a 7-day suuply    Can we leave a detailed message on this number? YES    Phone number patient can be reached at: Cell number on file:    Telephone Information:   Mobile [phone]     Best Time: any    Call taken on 4/7/2021 at 3:42 PM by Sofie Schrader

## 2021-04-13 ENCOUNTER — THERAPY VISIT (OUTPATIENT)
Dept: OCCUPATIONAL THERAPY | Facility: CLINIC | Age: 71
End: 2021-04-13
Payer: COMMERCIAL

## 2021-04-13 DIAGNOSIS — M19.90 ARTHRITIS: ICD-10-CM

## 2021-04-13 DIAGNOSIS — M79.646 THUMB PAIN, UNSPECIFIED LATERALITY: Primary | ICD-10-CM

## 2021-04-13 PROCEDURE — 97110 THERAPEUTIC EXERCISES: CPT | Mod: GO | Performed by: OCCUPATIONAL THERAPIST

## 2021-04-13 PROCEDURE — 97140 MANUAL THERAPY 1/> REGIONS: CPT | Mod: GO | Performed by: OCCUPATIONAL THERAPIST

## 2021-04-13 NOTE — PROGRESS NOTES
SOAP Note - Hand Therapy - Objective Information    Current Date:  4/13/2021  Referring Physician:Angus Llanes MD    Diagnosis: Bilateral thumb pain (left greater than right)  DOI: 3/2019     Yoan Baltazar is a 70 year old right hand dominant male.    Patient reports symptoms of pain, stiffness/loss of motion, weakness/loss of strength and edema of the bilateral thumbs which occurred due to an unknown etiology.     Occupational Profile Information:  Current occupation is Executive    S:  Subjective changes as noted by patient: I've been doing a lot of heat and cold therapy. I've been doing the exercises.  Functional changes noted by patient: Less pain with playing tennis      O:  Pain Level (Scale 0-10):   3/30/2021 4/13/21   At Rest 1/10 1/10   With Use (L) 1-5/10  (R) 1-2/10 (L) 1-5/10  (R) 0-1/10     Pain Description:  Date 3/30/2021   Location Bilateral thumb   Pain Quality Aching and Throbbing   Frequency intermittent     Pain is worst  daytime, morning   Exacerbated by  gripping, weight bearing   Relieved by cold, rest and Tylenol   Progression unchanged     ROM  Thumb 3/30/2021 3/30/2021   AROM  (PROM) right Left   MP /45 /35   IP /55 /58   RABD 50 35   PABD 43 40   Kapandji Opposition Scale (0-10/10) 9/10 9/10     Thumb Observation/Appearance  Key: + = present/ - = not observed    3/30/2021   Shoulder deformity present over CMC R:+  L:+   Volar subluxation present R:+  L:+   Edema over the CMC joint R:+  L:+   Noted collapse of MP into hyperextension during pinch R:+  L:   Tenderness at CMC R:+  L:+      Provocative Tests  Pain Report:  - none    + mild    ++ moderate    +++ severe     3/30/2021   CMC Grind test R: -  L: -   Crepitus present R: -  L: +   CMC Adduction Stress Test R: -  L: +   CMC Extension Stress Test R: +  L: -   Finkelstein's R: -  L: -     Strength   (Measured in pounds)  Pain Report: - none  + mild    ++ moderate    +++ severe    3/30/2021 3/30/2021   Trials Right Left    1  2  3 82+  85  79 82  84  79   Average 82 82     Lat Pinch 3/30/2021 3/30/2021   Trials Right Left   1  2  3 21 17+   Average       3 Pt Pinch 3/30/2021 3/30/2021   Trials Right Left   1   2  3 16 19   Average     Please refer to the daily flowsheet for treatment provided today.     Home Program:  Warmth  1st web release with clip  Self CMC mobilization on chest  Place and hold pinch  Thumb Stabilization Program with hard  C  and index abd  CMC neoprene cool comfort orthosis during the day with ADL s per symptoms  Incorporate joint protection into daily functional activities  Adaptive equipment as needed.    Next Visit:  Hand based Thumb Spica orthosis night/sleeping and per symptoms during the day

## 2021-04-27 ENCOUNTER — THERAPY VISIT (OUTPATIENT)
Dept: OCCUPATIONAL THERAPY | Facility: CLINIC | Age: 71
End: 2021-04-27
Payer: COMMERCIAL

## 2021-04-27 DIAGNOSIS — M19.90 ARTHRITIS: ICD-10-CM

## 2021-04-27 DIAGNOSIS — M79.646 THUMB PAIN, UNSPECIFIED LATERALITY: Primary | ICD-10-CM

## 2021-04-27 PROCEDURE — 97110 THERAPEUTIC EXERCISES: CPT | Mod: GO | Performed by: OCCUPATIONAL THERAPIST

## 2021-04-27 PROCEDURE — 97140 MANUAL THERAPY 1/> REGIONS: CPT | Mod: GO | Performed by: OCCUPATIONAL THERAPIST

## 2021-04-27 NOTE — PROGRESS NOTES
Progress Note - Hand Therapy     Current Date:  4/27/2021  Reporting period is 3/30/21 to 4/27/2021  Referring Physician:Angus Llanes MD    Diagnosis: Bilateral thumb pain (left greater than right)  DOI: 3/2019     oYan Baltazar is a 70 year old right hand dominant male.    Patient reports symptoms of pain, stiffness/loss of motion, weakness/loss of strength and edema of the bilateral thumbs which occurred due to an unknown etiology.     Occupational Profile Information:  Current occupation is Executive    S:  Subjective changes as noted by patient: The thumbs are about the same.  Hot and cold and doing the exercises.  Functional changes noted by patient: Been OK with playing tennis    Upper Extremity Functional Index Score:  SCORE:   Column Totals: /80: 76   (A lower score indicates greater disability.)      O:  Pain Level (Scale 0-10):   3/30/2021 4/13/21 4/27/21   At Rest 1/10 1/10 (B) 0-1/10   With Use (L) 1-5/10  (R) 1-2/10 (L) 1-5/10  (R) 0-1/10 0-5/10     Pain Description:  Date 3/30/2021   Location Bilateral thumb   Pain Quality Aching and Throbbing   Frequency intermittent     Pain is worst  daytime, morning   Exacerbated by  gripping, weight bearing   Relieved by cold, rest and Tylenol   Progression unchanged     ROM  Thumb 3/30/2021 3/30/2021 4/27/21 4/27/21   AROM  (PROM) right Left Left Left   MP /45 /35     IP /55 /58     RABD 50 35 60 45   PABD 43 40 50 50   Kapandji Opposition Scale (0-10/10) 9/10 9/10       Thumb Observation/Appearance  Key: + = present/ - = not observed    3/30/2021   Shoulder deformity present over CMC R:+  L:+   Volar subluxation present R:+  L:+   Edema over the CMC joint R:+  L:+   Noted collapse of MP into hyperextension during pinch R:+  L:   Tenderness at CMC R:+  L:+      Provocative Tests  Pain Report:  - none    + mild    ++ moderate    +++ severe     3/30/2021 4/27/21   CMC Grind test R: -  L: -    Crepitus present R: -  L: +    CMC Adduction Stress Test R:  -  L: + R +  L +   CMC Extension Stress Test R: +  L: -    Finkelstein's R: -  L: -      Strength   (Measured in pounds)  Pain Report: - none  + mild    ++ moderate    +++ severe    3/30/2021 3/30/2021   Trials Right Left   1  2  3 82+  85  79 82  84  79   Average 82 82     Lat Pinch 3/30/2021 3/30/2021   Trials Right Left   1  2  3 21 17+   Average       3 Pt Pinch 3/30/2021 3/30/2021   Trials Right Left   1   2  3 16 19   Average     Please refer to the daily flowsheet for treatment provided today.     Assessment:  Response to therapy has been improvement to:  ROM of Thumb:  Radial Abduction, Negron abduction  Flexibility:  less tightness in involved muscles  Response to therapy has been lack of progress in:  Pain:  unchanged    Overall Assessment:  Patient is progressing well and is ready to decrease frequency of treatment in the clinic.  Patient is becoming more independent in home exercise program  Patient would benefit from continued therapy to achieve rehab potential  STG/LTG:  STGoals have been reviewed and progress or achievement has occurred;  see goal sheet for details and updates.    I have re-evaluated this patient and find that the nature, scope, duration and intensity of the therapy is appropriate for the medical condition of the patient.  Plan:  Frequency/Duration:  Recommend holding therapy    Home Program:  Warmth  1st web release with clip  Self CMC mobilization on chest  Place and hold pinch  Thumb Stabilization Program with hard  C  and index abd  CMC neoprene cool comfort orthosis during the day with ADL s per symptoms  Incorporate joint protection into daily functional activities  Adaptive equipment as needed.    Next Visit:  No further visits scheduled at this time. The chart will be left open for two months to allow patient to schedule further appointments if problems develop. If no additional therapy sessions are scheduled, then the patient will be discharged and this will serve as a  discharge note.

## 2021-05-17 ENCOUNTER — TELEPHONE (OUTPATIENT)
Dept: FAMILY MEDICINE | Facility: CLINIC | Age: 71
End: 2021-05-17

## 2021-05-17 DIAGNOSIS — I10 ESSENTIAL HYPERTENSION WITH GOAL BLOOD PRESSURE LESS THAN 140/90: Primary | ICD-10-CM

## 2021-05-17 NOTE — TELEPHONE ENCOUNTER
Patient requesting labs prior to office visit if applicable.   Please advise and route back if triage follow up needed.    Jose Cota RN  [facility].

## 2021-05-17 NOTE — TELEPHONE ENCOUNTER
Pt calling and has an appt on Wednesday. 5/19 for a follow up. He would like to have labs done before the appt. Please place orders if appropriate. Thank you. Pt can be reached at [phone].  Marian Mortensen,

## 2021-05-19 ENCOUNTER — OFFICE VISIT (OUTPATIENT)
Dept: FAMILY MEDICINE | Facility: CLINIC | Age: 71
End: 2021-05-19
Payer: COMMERCIAL

## 2021-05-19 VITALS
BODY MASS INDEX: 29.28 KG/M2 | DIASTOLIC BLOOD PRESSURE: 70 MMHG | SYSTOLIC BLOOD PRESSURE: 134 MMHG | OXYGEN SATURATION: 98 % | WEIGHT: 198.3 LBS | TEMPERATURE: 96.9 F | HEART RATE: 67 BPM

## 2021-05-19 DIAGNOSIS — N18.30 STAGE 3 CHRONIC KIDNEY DISEASE, UNSPECIFIED WHETHER STAGE 3A OR 3B CKD (H): ICD-10-CM

## 2021-05-19 DIAGNOSIS — I10 ESSENTIAL HYPERTENSION: Primary | ICD-10-CM

## 2021-05-19 LAB
ANION GAP SERPL CALCULATED.3IONS-SCNC: 4 MMOL/L (ref 3–14)
BUN SERPL-MCNC: 16 MG/DL (ref 7–30)
CALCIUM SERPL-MCNC: 9.2 MG/DL (ref 8.5–10.1)
CHLORIDE SERPL-SCNC: 110 MMOL/L (ref 94–109)
CO2 SERPL-SCNC: 27 MMOL/L (ref 20–32)
CREAT SERPL-MCNC: 1.17 MG/DL (ref 0.66–1.25)
GFR SERPL CREATININE-BSD FRML MDRD: 63 ML/MIN/{1.73_M2}
GLUCOSE SERPL-MCNC: 84 MG/DL (ref 70–99)
POTASSIUM SERPL-SCNC: 3.7 MMOL/L (ref 3.4–5.3)
SODIUM SERPL-SCNC: 141 MMOL/L (ref 133–144)

## 2021-05-19 PROCEDURE — 80048 BASIC METABOLIC PNL TOTAL CA: CPT | Performed by: INTERNAL MEDICINE

## 2021-05-19 PROCEDURE — 99213 OFFICE O/P EST LOW 20 MIN: CPT | Performed by: INTERNAL MEDICINE

## 2021-05-19 PROCEDURE — 36415 COLL VENOUS BLD VENIPUNCTURE: CPT | Performed by: INTERNAL MEDICINE

## 2021-05-19 NOTE — PATIENT INSTRUCTIONS
(I10) Essential hypertension  (primary encounter diagnosis)  Comment: We will recheck basic metabolic panel and consider adjustment in blood pressure medications   Plan: Basic metabolic panel  (Ca, Cl, CO2, Creat,         Gluc, K, Na, BUN)            (N18.30) Stage 3 chronic kidney disease, unspecified whether stage 3a or 3b CKD  Comment: as above - continue to avoid NSAIDs, continue current blood pressure medications.  Make sure to stay hydrated  Plan:      Shingrix vaccine is now available.  I would call your insurance to see if a shingles vaccine is covered and get this at your pharmacy

## 2021-05-19 NOTE — PROGRESS NOTES
Subjective   Yoan is a 70 year old who presents for the following health issues     HPI     Chief Complaint   Patient presents with     Follow Up     Essential hypertension    Yoan Baltazar presents to the clinic today for follow up of his blood pressure.  He has stopped using ibuprofen except when having notable inflammation after playing twice per day tennis matches.  He has maintained excellent hydration.    Review of Systems   Constitutional, HEENT, cardiovascular, pulmonary, GI, , musculoskeletal - has completed hand therapy, neuro, skin, endocrine and psych systems are negative, except as otherwise noted.      Objective    /70 (BP Location: Left arm, Patient Position: Sitting, Cuff Size: Adult Regular)   Pulse 67   Temp 96.9  F (36.1  C) (Temporal)   Wt 89.9 kg (198 lb 4.8 oz)   SpO2 98%   BMI 29.28 kg/m    Body mass index is 29.28 kg/m .  Physical Exam   GENERAL: healthy, alert and no distress  EYES: Eyes grossly normal to inspection,   RESP: lungs clear to auscultation - no rales, rhonchi or wheezes  CV: regular rate and rhythm,  peripheral edema and peripheral pulses strong  MS: no gross musculoskeletal defects noted, no edema  SKIN: no suspicious lesions or rashes  NEURO: Normal strength and tone, mentation intact and speech normal  PSYCH: mentation appears normal, affect normal/bright    Patient Instructions   (I10) Essential hypertension  (primary encounter diagnosis)  Comment: We will recheck basic metabolic panel and consider adjustment in blood pressure medications   Plan: Basic metabolic panel  (Ca, Cl, CO2, Creat,         Gluc, K, Na, BUN)            (N18.30) Stage 3 chronic kidney disease, unspecified whether stage 3a or 3b CKD  Comment: as above - continue to avoid NSAIDs, continue current blood pressure medications.  Make sure to stay hydrated  Plan:      Shingrix vaccine is now available.  I would call your insurance to see if a shingles vaccine is covered and get this at your  pharmacy

## 2021-05-21 NOTE — RESULT ENCOUNTER NOTE
Conor Milton,    I have had the opportunity to review your recent results and an interpretation is as follows:  Your follow-up basic metabolic panel shows stable renal function and electrolytes.  Continue current blood pressure meds    Sincerely,  Angus Llanes MD

## 2021-05-29 ENCOUNTER — RECORDS - HEALTHEAST (OUTPATIENT)
Dept: ADMINISTRATIVE | Facility: CLINIC | Age: 71
End: 2021-05-29

## 2021-05-31 ENCOUNTER — RECORDS - HEALTHEAST (OUTPATIENT)
Dept: ADMINISTRATIVE | Facility: CLINIC | Age: 71
End: 2021-05-31

## 2021-06-18 ENCOUNTER — HOSPITAL ENCOUNTER (OUTPATIENT)
Dept: MRI IMAGING | Facility: CLINIC | Age: 71
Discharge: HOME | End: 2021-06-18
Attending: UROLOGY | Admitting: UROLOGY
Payer: COMMERCIAL

## 2021-06-18 DIAGNOSIS — N40.2 PROSTATE NODULE: ICD-10-CM

## 2021-06-18 PROCEDURE — 72197 MRI PELVIS W/O & W/DYE: CPT

## 2021-06-18 PROCEDURE — A9585 GADOBUTROL INJECTION: HCPCS | Performed by: UROLOGY

## 2021-06-18 PROCEDURE — 72197 MRI PELVIS W/O & W/DYE: CPT | Mod: 26 | Performed by: RADIOLOGY

## 2021-06-18 PROCEDURE — 255N000002 HC RX 255 OP 636: Performed by: UROLOGY

## 2021-06-18 RX ORDER — GADOBUTROL 604.72 MG/ML
10 INJECTION INTRAVENOUS ONCE
Status: COMPLETED | OUTPATIENT
Start: 2021-06-18 | End: 2021-06-18

## 2021-06-18 RX ADMIN — GADOBUTROL 9 ML: 604.72 INJECTION INTRAVENOUS at 09:54

## 2021-06-22 ENCOUNTER — VIRTUAL VISIT (OUTPATIENT)
Dept: UROLOGY | Facility: CLINIC | Age: 71
End: 2021-06-22
Payer: COMMERCIAL

## 2021-06-22 DIAGNOSIS — R97.20 PSA ELEVATION: Primary | ICD-10-CM

## 2021-06-22 PROCEDURE — 99214 OFFICE O/P EST MOD 30 MIN: CPT | Mod: 95 | Performed by: UROLOGY

## 2021-06-22 NOTE — PROGRESS NOTES
History: Follow-up video consultation with this very pleasant 70-year-old gentleman.  He has a history of nocturia and mild slowing of the urine stream and did have a urinary tract infection in 2018 with a significant growth of E. coli.  More recently we have noticed some significant rise in the PSA i.e. the PSA velocity although the most recent PSA in February of this year was only 3.56.  Because of this we did a T3 MRI of the prostate and in addition to that the patient indicated his father did have prostate surgery about 4 years ago although he was not sure the nature of the surgery and did not know whether this was for cancer.  That PSA a year ago was as follows  IMPRESSION:  1. Based on the most suspicious abnormality, this exam is  characterized as PIRADS 3 - The presence of clinically significant  cancer is equivocal.  The most suspicious abnormality is located at  the 5:00 position left prostate base. There is minimal capsular  abutment with no convincing evidence of extraprostatic extension. It  is possible that this could reflect focal prostatitis.  1a. T2 hypointensity throughout the peripheral gland in patchy  distribution, suggesting underlying prostatitis.  2. Focus of mild early enhancement in the right seminal vesicle  without a discrete mass visualized in the right prostate base  extending into this region. This could reflect focal inflammatory  process as opposed to malignancy. Noting possibility of focal  prostatitis in the left prostate base, patient may benefit from a  short-term 3-6 month follow-up MRI exam if conservative management is  desired.  3. No suspicious lymphadenopathy.  4. No other evidence of pelvic metastases.    The PSA in November 2020 was 2.9  The PSA in March 2021 was 3.46.    We did repeat the MRI scan because of some concern about the report on the previous scan to see if there have been any significant changes which will be of concern.  MRI PROSTATE: 6/18/2021 10:02  AM     CLINICAL HISTORY: Prostate nodule      Most Recent PSA: 3.46 ug/L     Comparison: MRI 4/2/2020.     TECHNIQUE:  The following sequences were obtained: High-resolution axial  T2-weighted, coronal T2-weighted, 3D volumetric T2-weighted, axial  pre-contrast T1, axial diffusion-weighted, axial apparent diffusion  coefficient and axial dynamic contrast-enhanced T1. Postcontrast  images were evaluated on a separate workstation to evaluate dynamic  contrast enhancement. The technique of this exam is PI-RADS v2.1  compliant. Contrast dose: 9 cc of Gadavist injected.      FINDINGS:  Size: 3.6 x 4.2 x 4 cm  Volume: 31.5  Hemorrhage: Absent  Peripheral zone: Heterogeneous on T2-weighted images. Regions of  mildly decreased signal on ADC or DWI which are best characterized as  PI-RADS 2 without highly suspicious lesion.  Transition zone: Enlarged with BPH changes. Transition zone nodules  which are circumscribed or mostly encapsulated without diffusion  restriction.  PI-RADS 2.  No highly suspicious nodules.     Lesion(s) in rank order of severity (highest score- to lowest score,  then by size)      Lesion 1:  Location: Left base peripheral zone at the 4-6 o'clock position  relative to the urethra. Series 4 image 50.   Additional prostate regions involved: None   Size: 21 mm  T2 description: Wedge shaped heterogeneous hypointense  T2 numerical assessment: 2  DWI description: Wedge shaped mild hyperintense on DWI  DWI numerical assessment: 2  DCE assessment: Negative    Prostate margin: Capsular abutment>15 mm with smooth contour    Lesion overall PI-RADS category: 2     Neurovascular bundles: No neurovascular bundle involvement by  malignancy.   Seminal vesicles: No seminal vesicle involvement by malignancy.   Lymph nodes: No lymph node involvement   Bones: No suspicious lesions. Heterotopic ossification along the  superior posterior aspect of the left greater trochanter.  Other pelvic organs: No additional  findings.                                                                      IMPRESSION:  1. Based on the most suspicious abnormality, this exam is  characterized as PIRADS 3 - The presence of clinically significant  cancer is equivocal. The most suspicious abnormality is located at the  4-6 o'clock of left prostate base. There is minimal capsular abutment  with no convincing evidence of extraprostatic extension. As also  stated on prior exam, it is possible that this could reflect focal  prostatitis.     2. No suspicious adenopathy or evidence of pelvic metastases.        I have personally reviewed the examination and initial interpretation  and I agree with the findings.     SUGAR TAVAREZ MD      Past Medical History:   Diagnosis Date     Glucose intolerance (pre-diabetes)      HTN (hypertension)      Hyperlipidaemia      Overweight (BMI 25.0-29.9)      Seasonal allergies     s/p allergy shots       Social History     Socioeconomic History     Marital status:      Spouse name: Not on file     Number of children: Not on file     Years of education: Not on file     Highest education level: Not on file   Occupational History     Not on file   Social Needs     Financial resource strain: Not on file     Food insecurity     Worry: Not on file     Inability: Not on file     Transportation needs     Medical: Not on file     Non-medical: Not on file   Tobacco Use     Smoking status: Never Smoker     Smokeless tobacco: Never Used   Substance and Sexual Activity     Alcohol use: Yes     Alcohol/week: 0.0 standard drinks     Comment: maybe 1 glass of wine a month     Drug use: No     Sexual activity: Yes     Partners: Female   Lifestyle     Physical activity     Days per week: Not on file     Minutes per session: Not on file     Stress: Not on file   Relationships     Social connections     Talks on phone: Not on file     Gets together: Not on file     Attends [employer] service: Not on file     Active member of club  or organization: Not on file     Attends meetings of clubs or organizations: Not on file     Relationship status: Not on file     Intimate partner violence     Fear of current or ex partner: Not on file     Emotionally abused: Not on file     Physically abused: Not on file     Forced sexual activity: Not on file   Other Topics Concern     Parent/sibling w/ CABG, MI or angioplasty before 65F 55M? Not Asked   Social History Narrative    7/2012Married- Children- 2Work- own business [employer]    Tobacco-noETOH- <1/ monthExercise- lift wts , starting to exercise--bike       Past Surgical History:   Procedure Laterality Date     ARTHROPLASTY KNEE Right 3/5/2015    Procedure: ARTHROPLASTY KNEE;  Surgeon: Yoan Oates MD;  [facility]     ARTHROSCOPY KNEE      bialt     EXCISE GANGLION WRIST      R wrsit     OPTICAL TRACKING SYSTEM ARTHROPLASTY KNEE Left 1/12/2015    Procedure: OPTICAL TRACKING SYSTEM ARTHROPLASTY KNEE;  Surgeon: Yoan Oates MD;  [facility]     SHOULDER SURGERY      bilat      tibial ostoetomy      2 on Left , R x1 -[place]     TONSILLECTOMY       VASECTOMY         Family History   Problem Relation Age of Onset     Hypertension Mother      Arthritis Mother         knee replacement     Arthritis Father         knee replacement     Pancreatic Cancer Sister      Prostate Cancer No family hx of          Current Outpatient Medications:      amLODIPine (NORVASC) 10 MG tablet, Take 1 tablet (10 mg) by mouth daily, Disp: 90 tablet, Rfl: 3     aspirin 81 MG tablet, Take 1 tablet (81 mg) by mouth daily, Disp: 30 tablet, Rfl:      B Complex Vitamins (VITAMIN B COMPLEX PO), Take 1 tablet by mouth daily., Disp: , Rfl:      Glucosamine-Chondroitin (GLUCOSAMINE CHONDR COMPLEX PO), Take 1 capsule by mouth 2 times daily. 1500 mg, Disp: , Rfl:      losartan (COZAAR) 50 MG tablet, Take 1 tablet (50 mg) by mouth daily, Disp: 90 tablet, Rfl: 3     Multiple Vitamin (DAILY MULTIVITAMIN PO), Take 1  tablet by mouth daily., Disp: , Rfl:      omeprazole 20 MG PO tablet, Take 1 tablet (20 mg) by mouth daily Take 30-60 minutes before a meal., Disp: 90 tablet, Rfl: 3     simvastatin (ZOCOR) 40 MG tablet, TAKE 1 TABLET BY MOUTH EVERY NIGHT AT BEDTIME, Disp: 7 tablet, Rfl: 0     tadalafil (CIALIS) 10 MG tablet, Take 0.5-1 tablets (5-10 mg) by mouth daily as needed for erectile dysfunction Never use with nitroglycerin, terazosin or doxazosin., Disp: 12 tablet, Rfl: 11     triamterene-HCTZ (DYAZIDE) 37.5-25 MG capsule, Take 1 capsule by mouth daily, Disp: 90 capsule, Rfl: 3    10 point ROS of systems including Constitutional, Eyes, Respiratory, Cardiovascular, Gastroenterology, Genitourinary, Integumentary, Muscularskeletal, Psychiatric and Neurologic were all negative except for pertinent positives noted in my HPI.    Examination:   There were no vitals taken for this visit.  General Impression: Very pleasant patient in no acute distress, well-oriented in time place and person and quite conversational  Mental Status: Normal  HEENT: Extraocular movements intact.  No clinical evidence of jaundice on examination of eyes.  Mucous membranes are unremarkable  Skin:  No other abnormalities  Respiratory System: Unlabored on room air.  Respiratory cycle normal  Lymph Nodes: Not examined  Back/Flank Tenderness: Not examined  Cardiovascular System: Not examined  Abdominal Examination: Not examined  Extremities: Not examined  Genitial: Not examined  Rectal Examination: Not examined  Neurologic System: There are no significant acute abnormal neurological signs in the central or peripheral nervous systems    Impression: I went over with both MRI studies very carefully and had a careful discussion with the patient.  The findings indicate that there is a strong recommendation that these areas of note in the prostate at each of the 2 MRIs of the significantly possible to be due to prostatitis alone.  For that reason I do not think we  need to consider biopsy at present.  However I would like to see him reviewed again in 6 months in view of this mild concern about areas of the prostate.  At that time we can repeat the PSA.  If it is stable as they have seemed to become of 6 months then we could probably discontinue observation if it has risen then we may need to consider either another MRI or ED proceeding to an MRI fusion biopsy.  I have had a careful discussion with about this in detail.  I usually do not recommend biopsies in patients with PI-RADS 3 findings only unless we are concerned about the change over a period of time and the appearance of the gland that may be of concern.  I did go over the entire situation with the patient in detail today.  The patient seems happy with this approach.  I will arrange for him to meet Dr. Angus Tomlinson in 6 months for PSA and examination and his opinion.    Plan: 6 months PSA and examination with Dr. Angus Tomlinson    Time: Total time which included review of records labs MRI studies in particular both current and previous discussion on video consideration of findings decision making.  25 minutes

## 2021-06-22 NOTE — LETTER
6/22/2021       RE: Yoan Baltazar  [address]  [place] [address]     Dear Colleague,    Thank you for referring your patient, Yoan Baltazar, to the [facility] at [facility]. Please see a copy of my visit note below.    History: Follow-up video consultation with this very pleasant 70-year-old gentleman.  He has a history of nocturia and mild slowing of the urine stream and did have a urinary tract infection in 2018 with a significant growth of E. coli.  More recently we have noticed some significant rise in the PSA i.e. the PSA velocity although the most recent PSA in February of this year was only 3.56.  Because of this we did a T3 MRI of the prostate and in addition to that the patient indicated his father did have prostate surgery about 4 years ago although he was not sure the nature of the surgery and did not know whether this was for cancer.  That PSA a year ago was as follows  IMPRESSION:  1. Based on the most suspicious abnormality, this exam is  characterized as PIRADS 3 - The presence of clinically significant  cancer is equivocal.  The most suspicious abnormality is located at  the 5:00 position left prostate base. There is minimal capsular  abutment with no convincing evidence of extraprostatic extension. It  is possible that this could reflect focal prostatitis.  1a. T2 hypointensity throughout the peripheral gland in patchy  distribution, suggesting underlying prostatitis.  2. Focus of mild early enhancement in the right seminal vesicle  without a discrete mass visualized in the right prostate base  extending into this region. This could reflect focal inflammatory  process as opposed to malignancy. Noting possibility of focal  prostatitis in the left prostate base, patient may benefit from a  short-term 3-6 month follow-up MRI exam if conservative management is  desired.  3. No suspicious lymphadenopathy.  4. No other  evidence of pelvic metastases.    The PSA in November 2020 was 2.9  The PSA in March 2021 was 3.46.    We did repeat the MRI scan because of some concern about the report on the previous scan to see if there have been any significant changes which will be of concern.  MRI PROSTATE: 6/18/2021 10:02 AM     CLINICAL HISTORY: Prostate nodule      Most Recent PSA: 3.46 ug/L     Comparison: MRI 4/2/2020.     TECHNIQUE:  The following sequences were obtained: High-resolution axial  T2-weighted, coronal T2-weighted, 3D volumetric T2-weighted, axial  pre-contrast T1, axial diffusion-weighted, axial apparent diffusion  coefficient and axial dynamic contrast-enhanced T1. Postcontrast  images were evaluated on a separate workstation to evaluate dynamic  contrast enhancement. The technique of this exam is PI-RADS v2.1  compliant. Contrast dose: 9 cc of Gadavist injected.      FINDINGS:  Size: 3.6 x 4.2 x 4 cm  Volume: 31.5  Hemorrhage: Absent  Peripheral zone: Heterogeneous on T2-weighted images. Regions of  mildly decreased signal on ADC or DWI which are best characterized as  PI-RADS 2 without highly suspicious lesion.  Transition zone: Enlarged with BPH changes. Transition zone nodules  which are circumscribed or mostly encapsulated without diffusion  restriction.  PI-RADS 2.  No highly suspicious nodules.     Lesion(s) in rank order of severity (highest score- to lowest score,  then by size)      Lesion 1:  Location: Left base peripheral zone at the 4-6 o'clock position  relative to the urethra. Series 4 image 50.   Additional prostate regions involved: None   Size: 21 mm  T2 description: Wedge shaped heterogeneous hypointense  T2 numerical assessment: 2  DWI description: Wedge shaped mild hyperintense on DWI  DWI numerical assessment: 2  DCE assessment: Negative    Prostate margin: Capsular abutment>15 mm with smooth contour    Lesion overall PI-RADS category: 2     Neurovascular bundles: No neurovascular bundle involvement  by  malignancy.   Seminal vesicles: No seminal vesicle involvement by malignancy.   Lymph nodes: No lymph node involvement   Bones: No suspicious lesions. Heterotopic ossification along the  superior posterior aspect of the left greater trochanter.  Other pelvic organs: No additional findings.                                                                      IMPRESSION:  1. Based on the most suspicious abnormality, this exam is  characterized as PIRADS 3 - The presence of clinically significant  cancer is equivocal. The most suspicious abnormality is located at the  4-6 o'clock of left prostate base. There is minimal capsular abutment  with no convincing evidence of extraprostatic extension. As also  stated on prior exam, it is possible that this could reflect focal  prostatitis.     2. No suspicious adenopathy or evidence of pelvic metastases.        I have personally reviewed the examination and initial interpretation  and I agree with the findings.     SUGAR TAVAREZ MD      Past Medical History:   Diagnosis Date     Glucose intolerance (pre-diabetes)      HTN (hypertension)      Hyperlipidaemia      Overweight (BMI 25.0-29.9)      Seasonal allergies     s/p allergy shots       Social History     Socioeconomic History     Marital status:      Spouse name: Not on file     Number of children: Not on file     Years of education: Not on file     Highest education level: Not on file   Occupational History     Not on file   Social Needs     Financial resource strain: Not on file     Food insecurity     Worry: Not on file     Inability: Not on file     Transportation needs     Medical: Not on file     Non-medical: Not on file   Tobacco Use     Smoking status: Never Smoker     Smokeless tobacco: Never Used   Substance and Sexual Activity     Alcohol use: Yes     Alcohol/week: 0.0 standard drinks     Comment: maybe 1 glass of wine a month     Drug use: No     Sexual activity: Yes     Partners: Female   Lifestyle      Physical activity     Days per week: Not on file     Minutes per session: Not on file     Stress: Not on file   Relationships     Social connections     Talks on phone: Not on file     Gets together: Not on file     Attends Hoahaoism service: Not on file     Active member of club or organization: Not on file     Attends meetings of clubs or organizations: Not on file     Relationship status: Not on file     Intimate partner violence     Fear of current or ex partner: Not on file     Emotionally abused: Not on file     Physically abused: Not on file     Forced sexual activity: Not on file   Other Topics Concern     Parent/sibling w/ CABG, MI or angioplasty before 65F 55M? Not Asked   Social History Narrative    7/2012Married- Children- 2Work- own business [employer]    Tobacco-noETOH- <1/ monthExercise- lift wts , starting to exercise--bike       Past Surgical History:   Procedure Laterality Date     ARTHROPLASTY KNEE Right 3/5/2015    Procedure: ARTHROPLASTY KNEE;  Surgeon: Yoan Oates MD;  [facility]     ARTHROSCOPY KNEE      bialt     EXCISE GANGLION WRIST      R wrsit     OPTICAL TRACKING SYSTEM ARTHROPLASTY KNEE Left 1/12/2015    Procedure: OPTICAL TRACKING SYSTEM ARTHROPLASTY KNEE;  Surgeon: Yoan Oates MD;  [facility]     SHOULDER SURGERY      bilat      tibial ostoetomy      2 on Left , R x1 -[place]     TONSILLECTOMY       VASECTOMY         Family History   Problem Relation Age of Onset     Hypertension Mother      Arthritis Mother         knee replacement     Arthritis Father         knee replacement     Pancreatic Cancer Sister      Prostate Cancer No family hx of          Current Outpatient Medications:      amLODIPine (NORVASC) 10 MG tablet, Take 1 tablet (10 mg) by mouth daily, Disp: 90 tablet, Rfl: 3     aspirin 81 MG tablet, Take 1 tablet (81 mg) by mouth daily, Disp: 30 tablet, Rfl:      B Complex Vitamins (VITAMIN B COMPLEX PO), Take 1 tablet by mouth daily., Disp:  , Rfl:      Glucosamine-Chondroitin (GLUCOSAMINE CHONDR COMPLEX PO), Take 1 capsule by mouth 2 times daily. 1500 mg, Disp: , Rfl:      losartan (COZAAR) 50 MG tablet, Take 1 tablet (50 mg) by mouth daily, Disp: 90 tablet, Rfl: 3     Multiple Vitamin (DAILY MULTIVITAMIN PO), Take 1 tablet by mouth daily., Disp: , Rfl:      omeprazole 20 MG PO tablet, Take 1 tablet (20 mg) by mouth daily Take 30-60 minutes before a meal., Disp: 90 tablet, Rfl: 3     simvastatin (ZOCOR) 40 MG tablet, TAKE 1 TABLET BY MOUTH EVERY NIGHT AT BEDTIME, Disp: 7 tablet, Rfl: 0     tadalafil (CIALIS) 10 MG tablet, Take 0.5-1 tablets (5-10 mg) by mouth daily as needed for erectile dysfunction Never use with nitroglycerin, terazosin or doxazosin., Disp: 12 tablet, Rfl: 11     triamterene-HCTZ (DYAZIDE) 37.5-25 MG capsule, Take 1 capsule by mouth daily, Disp: 90 capsule, Rfl: 3    10 point ROS of systems including Constitutional, Eyes, Respiratory, Cardiovascular, Gastroenterology, Genitourinary, Integumentary, Muscularskeletal, Psychiatric and Neurologic were all negative except for pertinent positives noted in my HPI.    Examination:   There were no vitals taken for this visit.  General Impression: Very pleasant patient in no acute distress, well-oriented in time place and person and quite conversational  Mental Status: Normal  HEENT: Extraocular movements intact.  No clinical evidence of jaundice on examination of eyes.  Mucous membranes are unremarkable  Skin:  No other abnormalities  Respiratory System: Unlabored on room air.  Respiratory cycle normal  Lymph Nodes: Not examined  Back/Flank Tenderness: Not examined  Cardiovascular System: Not examined  Abdominal Examination: Not examined  Extremities: Not examined  Genitial: Not examined  Rectal Examination: Not examined  Neurologic System: There are no significant acute abnormal neurological signs in the central or peripheral nervous systems    Impression: I went over with both MRI studies  very carefully and had a careful discussion with the patient.  The findings indicate that there is a strong recommendation that these areas of note in the prostate at each of the 2 MRIs of the significantly possible to be due to prostatitis alone.  For that reason I do not think we need to consider biopsy at present.  However I would like to see him reviewed again in 6 months in view of this mild concern about areas of the prostate.  At that time we can repeat the PSA.  If it is stable as they have seemed to become of 6 months then we could probably discontinue observation if it has risen then we may need to consider either another MRI or ED proceeding to an MRI fusion biopsy.  I have had a careful discussion with about this in detail.  I usually do not recommend biopsies in patients with PI-RADS 3 findings only unless we are concerned about the change over a period of time and the appearance of the gland that may be of concern.  I did go over the entire situation with the patient in detail today.  The patient seems happy with this approach.  I will arrange for him to meet Dr. Angus Tomlinson in 6 months for PSA and examination and his opinion.    Plan: 6 months PSA and examination with Dr. Angus Tomlinson    Time: Total time which included review of records labs MRI studies in particular both current and previous discussion on video consideration of findings decision making.  25 minutes    Chaim Richards MD

## 2021-06-23 ENCOUNTER — TELEPHONE (OUTPATIENT)
Dept: UROLOGY | Facility: CLINIC | Age: 71
End: 2021-06-23

## 2021-06-23 NOTE — TELEPHONE ENCOUNTER
----- Message from Pooja Cai sent at 6/23/2021  8:13 AM CDT -----  6 months PSA and examination with Dr. Angus Tomlinson    [facility]

## 2021-07-13 PROBLEM — M19.90 ARTHRITIS: Status: RESOLVED | Noted: 2021-03-30 | Resolved: 2021-07-13

## 2021-07-16 DIAGNOSIS — I10 ESSENTIAL HYPERTENSION WITH GOAL BLOOD PRESSURE LESS THAN 140/90: ICD-10-CM

## 2021-07-16 DIAGNOSIS — I10 BENIGN ESSENTIAL HYPERTENSION: ICD-10-CM

## 2021-07-16 DIAGNOSIS — E78.5 HYPERLIPIDEMIA LDL GOAL <130: ICD-10-CM

## 2021-07-16 RX ORDER — SIMVASTATIN 40 MG
TABLET ORAL
Qty: 90 TABLET | Refills: 2 | Status: SHIPPED | OUTPATIENT
Start: 2021-07-16 | End: 2021-10-14

## 2021-07-16 RX ORDER — SIMVASTATIN 40 MG
40 TABLET ORAL AT BEDTIME
Qty: 5 TABLET | Refills: 0 | Status: SHIPPED | OUTPATIENT
Start: 2021-07-16 | End: 2022-07-21

## 2021-07-16 RX ORDER — AMLODIPINE BESYLATE 10 MG/1
10 TABLET ORAL DAILY
Qty: 90 TABLET | Refills: 2 | Status: SHIPPED | OUTPATIENT
Start: 2021-07-16 | End: 2022-03-25

## 2021-07-16 RX ORDER — TRIAMTERENE AND HYDROCHLOROTHIAZIDE 37.5; 25 MG/1; MG/1
1 CAPSULE ORAL DAILY
Qty: 90 CAPSULE | Refills: 2 | Status: SHIPPED | OUTPATIENT
Start: 2021-07-16 | End: 2022-06-09

## 2021-07-16 RX ORDER — LOSARTAN POTASSIUM 50 MG/1
50 TABLET ORAL DAILY
Qty: 90 TABLET | Refills: 2 | Status: SHIPPED | OUTPATIENT
Start: 2021-07-16 | End: 2022-06-09

## 2021-07-16 NOTE — TELEPHONE ENCOUNTER
Reason for Call:  Medication or medication refill:    Do you use a [facility]?  Name of the pharmacy and phone number for the current request:      [facility] - [place], [place] - [address] [place] [address]    Name of the medication requested: simvastatin (ZOCOR) 40 MG tablet [37749]  &  amLODIPine (NORVASC) 10 MG tablet [62659]  &  losartan (COZAAR) 50 MG tablet [[id number]]  &  triamterene-HCTZ (DYAZIDE) 37.5-25 MG capsule [[id number]]    Other request: Pt is changing Rx supplier to [facility] and is completely out of simvastatin (ZOCOR), if possible please send a 5 day supply to : [facility] [address] [place] [address] [address]    Can we leave a detailed message on this number? YES    Phone number patient can be reached at: [phone]    Best Time: Any     Call taken on 7/16/2021 at 8:43 AM by Silvina Dozier

## 2021-08-07 ENCOUNTER — TRANSFERRED RECORDS (OUTPATIENT)
Dept: HEALTH INFORMATION MANAGEMENT | Facility: CLINIC | Age: 71
End: 2021-08-07

## 2021-08-24 ENCOUNTER — MYC MEDICAL ADVICE (OUTPATIENT)
Dept: FAMILY MEDICINE | Facility: CLINIC | Age: 71
End: 2021-08-24

## 2021-08-24 DIAGNOSIS — Z71.84 TRAVEL ADVICE ENCOUNTER: Primary | ICD-10-CM

## 2021-09-05 ENCOUNTER — HEALTH MAINTENANCE LETTER (OUTPATIENT)
Age: 71
End: 2021-09-05

## 2021-09-23 ENCOUNTER — LAB (OUTPATIENT)
Dept: LAB | Facility: CLINIC | Age: 71
End: 2021-09-23
Attending: INTERNAL MEDICINE
Payer: COMMERCIAL

## 2021-09-23 DIAGNOSIS — Z71.84 TRAVEL ADVICE ENCOUNTER: ICD-10-CM

## 2021-09-23 LAB — SARS-COV-2 RNA RESP QL NAA+PROBE: NEGATIVE

## 2021-09-23 PROCEDURE — U0005 INFEC AGEN DETEC AMPLI PROBE: HCPCS

## 2021-09-23 PROCEDURE — U0003 INFECTIOUS AGENT DETECTION BY NUCLEIC ACID (DNA OR RNA); SEVERE ACUTE RESPIRATORY SYNDROME CORONAVIRUS 2 (SARS-COV-2) (CORONAVIRUS DISEASE [COVID-19]), AMPLIFIED PROBE TECHNIQUE, MAKING USE OF HIGH THROUGHPUT TECHNOLOGIES AS DESCRIBED BY CMS-2020-01-R: HCPCS

## 2021-09-24 NOTE — RESULT ENCOUNTER NOTE
Conor Milton,    I have had the opportunity to review your recent results and an interpretation is as follows:  Your follow-up COVID PCR test returned negative.  However, as per the report a negative result does not rule out the presence of real-time PCR inhibitors in the specimen, or COVID RNA and concentrations below the limit of detection        Sincerely,  Angus Llanes MD

## 2021-10-14 ENCOUNTER — MEDICAL CORRESPONDENCE (OUTPATIENT)
Dept: HEALTH INFORMATION MANAGEMENT | Facility: CLINIC | Age: 71
End: 2021-10-14
Payer: COMMERCIAL

## 2021-10-14 DIAGNOSIS — E78.5 HYPERLIPIDEMIA LDL GOAL <130: ICD-10-CM

## 2021-10-14 RX ORDER — SIMVASTATIN 40 MG
TABLET ORAL
Qty: 90 TABLET | Refills: 1 | Status: SHIPPED | OUTPATIENT
Start: 2021-10-14 | End: 2022-04-12

## 2021-10-14 NOTE — TELEPHONE ENCOUNTER
Pt has about a week of medication left and would a 90 day supply prescription be sent to [facility].

## 2021-12-22 ENCOUNTER — OFFICE VISIT (OUTPATIENT)
Dept: UROLOGY | Facility: CLINIC | Age: 71
End: 2021-12-22
Payer: COMMERCIAL

## 2021-12-22 VITALS
HEART RATE: 67 BPM | OXYGEN SATURATION: 98 % | SYSTOLIC BLOOD PRESSURE: 140 MMHG | DIASTOLIC BLOOD PRESSURE: 80 MMHG | WEIGHT: 190 LBS | HEIGHT: 69 IN | BODY MASS INDEX: 28.14 KG/M2

## 2021-12-22 DIAGNOSIS — Z80.42 FAMILY HISTORY OF PROSTATE CANCER IN FATHER: ICD-10-CM

## 2021-12-22 DIAGNOSIS — R97.20 ELEVATED PROSTATE SPECIFIC ANTIGEN (PSA): Primary | ICD-10-CM

## 2021-12-22 LAB — PSA SERPL-MCNC: 3.6 UG/L (ref 0–4)

## 2021-12-22 PROCEDURE — 36415 COLL VENOUS BLD VENIPUNCTURE: CPT | Performed by: UROLOGY

## 2021-12-22 PROCEDURE — 84153 ASSAY OF PSA TOTAL: CPT | Performed by: UROLOGY

## 2021-12-22 PROCEDURE — 99214 OFFICE O/P EST MOD 30 MIN: CPT | Performed by: UROLOGY

## 2021-12-22 ASSESSMENT — PAIN SCALES - GENERAL: PAINLEVEL: NO PAIN (0)

## 2021-12-22 ASSESSMENT — MIFFLIN-ST. JEOR: SCORE: 1612.21

## 2021-12-22 NOTE — PROGRESS NOTES
[facility]  CHIEF COMPLAINT   It was my pleasure to see Yoan Baltazar who is a 71 year old male for follow-up of Elevated PSA.      HPI   Yoan Baltazar is a very pleasant 71 year old male     Prior patient of Dr. Richards - 6/22/21:  History: Follow-up video consultation with this very pleasant 70-year-old gentleman.  He has a history of nocturia and mild slowing of the urine stream and did have a urinary tract infection in 2018 with a significant growth of E. coli.  More recently we have noticed some significant rise in the PSA i.e. the PSA velocity although the most recent PSA in February of this year was only 3.56.  Because of this we did a T3 MRI of the prostate and in addition to that the patient indicated his father did have prostate surgery about 4 years ago although he was not sure the nature of the surgery and did not know whether this was for cancer.    TODAY 12/22/21:  Follow-up today to review his PSA  He has had no issues or changes in symptoms since June    Father had prostate removed - unclear the details if this was for cancer    PHYSICAL EXAM  Patient is a 71 year old  male   Vitals: There were no vitals taken for this visit.  There is no height or weight on file to calculate BMI.  General Appearance Adult:   Alert, no acute distress, oriented  HENT: throat/mouth:normal, good dentition  Lungs: no respiratory distress, or pursed lip breathing  Heart: No obvious jugular venous distension present  Abdomen: soft, nontender, no organomegaly or masses  Musculoskeltal: extremities normal, no peripheral edema  Skin: no suspicious lesions or rashes  Neuro: Alert, oriented, speech and mentation normal  Psych: affect and mood normal  Gait: Normal    Component PSA PSA Screen Urologic Phys   Latest Ref Rng & Units 0.00 - 4.00 ug/L 0.00 - 4.00 ng/mL   6/4/2002 0.71    3/19/2004 1.0    8/14/2006 0.95    1/22/2009 1.25    1/27/2010 1.29    5/2/2011 1.38    2/19/2019 3.46    2/26/2020 3.56    11/9/2020  2.90   3/15/2021  3.46    12/22/2021 3.60      UA RESULTS:  Recent Labs   Lab Test 07/13/18  1102   COLOR Yellow   APPEARANCE Cloudy   URINEGLC Negative   URINEBILI Negative   URINEKETONE Negative   SG 1.020   UBLD Moderate*   URINEPH 6.0   PROTEIN 30*   UROBILINOGEN 0.2   NITRITE Positive*   LEUKEST Small*   RBCU >100*   WBCU >100*     Creatinine   Date Value Ref Range Status   05/19/2021 1.17 0.66 - 1.25 mg/dL Final     IMAGING:  All pertinent imaging reviewed:    All imaging studies reviewed by me.  I personally reviewed these imaging films.  A formal report from radiology will follow.    CT ABD/PEL 6/18/21:  FINDINGS:  Size: 3.6 x 4.2 x 4 cm  Volume: 31.5  Hemorrhage: Absent  Peripheral zone: Heterogeneous on T2-weighted images. Regions of  mildly decreased signal on ADC or DWI which are best characterized as  PI-RADS 2 without highly suspicious lesion.  Transition zone: Enlarged with BPH changes. Transition zone nodules  which are circumscribed or mostly encapsulated without diffusion  restriction.  PI-RADS 2.  No highly suspicious nodules.     Lesion(s) in rank order of severity (highest score- to lowest score,  then by size)      Lesion 1:  Location: Left base peripheral zone at the 4-6 o'clock position  relative to the urethra. Series 4 image 50.   Additional prostate regions involved: None   Size: 21 mm  T2 description: Wedge shaped heterogeneous hypointense  T2 numerical assessment: 2  DWI description: Wedge shaped mild hyperintense on DWI  DWI numerical assessment: 2  DCE assessment: Negative    Prostate margin: Capsular abutment>15 mm with smooth contour    Lesion overall PI-RADS category: 2     Neurovascular bundles: No neurovascular bundle involvement by  malignancy.   Seminal vesicles: No seminal vesicle involvement by malignancy.   Lymph nodes: No lymph node involvement   Bones: No suspicious lesions. Heterotopic ossification along the  superior posterior aspect of the left greater trochanter.  Other pelvic organs: No  additional findings.                                                               IMPRESSION:  1. Based on the most suspicious abnormality, this exam is  characterized as PIRADS 3 - The presence of clinically significant  cancer is equivocal. The most suspicious abnormality is located at the  4-6 o'clock of left prostate base. There is minimal capsular abutment  with no convincing evidence of extraprostatic extension. As also  stated on prior exam, it is possible that this could reflect focal  prostatitis.     2. No suspicious adenopathy or evidence of pelvic metastases.      ASSESSMENT and PLAN  71-year-old man with previously elevated PSA and questionable family history of prostate cancer    Elevated PSA  -Reviewed his PSA history and trend  -His PSA today remains stable at 3.6 and this has been fairly stable since 2019  -I reviewed his prior MRI images reviewed the images personally.  I agree with radiologist interpretation.  The most recent MRI demonstrates the questionable lesion is a PI-RADS 2 lesion which is nonconcerning and does not warrant a biopsy  -We discussed that given his stability in his PSA over the last several years I would recommend continued annual PSA monitoring  -He does have a questionable family history of prostate cancer though the details of this are quite unclear  -I recommend he have his PSA checked with his annual physical and he may follow-up with me on a as needed basis      Time spent: 20 minutes spent on the date of the encounter doing chart review, history and exam, documentation and further activities as noted above.    Dwayne Tomlinson MD   Urology  [facility]  [facility] Phone: [phone]  [facility] Phone: [phone]

## 2021-12-22 NOTE — LETTER
12/22/2021       RE: Yoan Baltazar  [address] [address] [address]     Dear Colleague,    Thank you for referring your patient, Yoan Baltazar, to the [facility] at [facility]. Please see a copy of my visit note below.    [place]  CHIEF COMPLAINT   It was my pleasure to see Yoan Baltazar who is a 71 year old male for follow-up of Elevated PSA.      HPI   Yoan Baltazar is a very pleasant 71 year old male     Prior patient of Dr. Richards - 6/22/21:  History: Follow-up video consultation with this very pleasant 70-year-old gentleman.  He has a history of nocturia and mild slowing of the urine stream and did have a urinary tract infection in 2018 with a significant growth of E. coli.  More recently we have noticed some significant rise in the PSA i.e. the PSA velocity although the most recent PSA in February of this year was only 3.56.  Because of this we did a T3 MRI of the prostate and in addition to that the patient indicated his father did have prostate surgery about 4 years ago although he was not sure the nature of the surgery and did not know whether this was for cancer.    TODAY 12/22/21:  Follow-up today to review his PSA  He has had no issues or changes in symptoms since June    Father had prostate removed - unclear the details if this was for cancer    PHYSICAL EXAM  Patient is a 71 year old  male   Vitals: There were no vitals taken for this visit.  There is no height or weight on file to calculate BMI.  General Appearance Adult:   Alert, no acute distress, oriented  HENT: throat/mouth:normal, good dentition  Lungs: no respiratory distress, or pursed lip breathing  Heart: No obvious jugular venous distension present  Abdomen: soft, nontender, no organomegaly or masses  Musculoskeltal: extremities normal, no peripheral edema  Skin: no suspicious lesions or rashes  Neuro: Alert, oriented, speech and mentation normal  Psych:  affect and mood normal  Gait: Normal    Component PSA PSA Screen Urologic Phys   Latest Ref Rng & Units 0.00 - 4.00 ug/L 0.00 - 4.00 ng/mL   6/4/2002 0.71    3/19/2004 1.0    8/14/2006 0.95    1/22/2009 1.25    1/27/2010 1.29    5/2/2011 1.38    2/19/2019 3.46    2/26/2020 3.56    11/9/2020  2.90   3/15/2021 3.46    12/22/2021 3.60      UA RESULTS:  Recent Labs   Lab Test 07/13/18  1102   COLOR Yellow   APPEARANCE Cloudy   URINEGLC Negative   URINEBILI Negative   URINEKETONE Negative   SG 1.020   UBLD Moderate*   URINEPH 6.0   PROTEIN 30*   UROBILINOGEN 0.2   NITRITE Positive*   LEUKEST Small*   RBCU >100*   WBCU >100*     Creatinine   Date Value Ref Range Status   05/19/2021 1.17 0.66 - 1.25 mg/dL Final     IMAGING:  All pertinent imaging reviewed:    All imaging studies reviewed by me.  I personally reviewed these imaging films.  A formal report from radiology will follow.    CT ABD/PEL 6/18/21:  FINDINGS:  Size: 3.6 x 4.2 x 4 cm  Volume: 31.5  Hemorrhage: Absent  Peripheral zone: Heterogeneous on T2-weighted images. Regions of  mildly decreased signal on ADC or DWI which are best characterized as  PI-RADS 2 without highly suspicious lesion.  Transition zone: Enlarged with BPH changes. Transition zone nodules  which are circumscribed or mostly encapsulated without diffusion  restriction.  PI-RADS 2.  No highly suspicious nodules.     Lesion(s) in rank order of severity (highest score- to lowest score,  then by size)      Lesion 1:  Location: Left base peripheral zone at the 4-6 o'clock position  relative to the urethra. Series 4 image 50.   Additional prostate regions involved: None   Size: 21 mm  T2 description: Wedge shaped heterogeneous hypointense  T2 numerical assessment: 2  DWI description: Wedge shaped mild hyperintense on DWI  DWI numerical assessment: 2  DCE assessment: Negative    Prostate margin: Capsular abutment>15 mm with smooth contour    Lesion overall PI-RADS category: 2     Neurovascular bundles:  No neurovascular bundle involvement by  malignancy.   Seminal vesicles: No seminal vesicle involvement by malignancy.   Lymph nodes: No lymph node involvement   Bones: No suspicious lesions. Heterotopic ossification along the  superior posterior aspect of the left greater trochanter.  Other pelvic organs: No additional findings.                                                               IMPRESSION:  1. Based on the most suspicious abnormality, this exam is  characterized as PIRADS 3 - The presence of clinically significant  cancer is equivocal. The most suspicious abnormality is located at the  4-6 o'clock of left prostate base. There is minimal capsular abutment  with no convincing evidence of extraprostatic extension. As also  stated on prior exam, it is possible that this could reflect focal  prostatitis.     2. No suspicious adenopathy or evidence of pelvic metastases.      ASSESSMENT and PLAN  71-year-old man with previously elevated PSA and questionable family history of prostate cancer    Elevated PSA  -Reviewed his PSA history and trend  -His PSA today remains stable at 3.6 and this has been fairly stable since 2019  -I reviewed his prior MRI images reviewed the images personally.  I agree with radiologist interpretation.  The most recent MRI demonstrates the questionable lesion is a PI-RADS 2 lesion which is nonconcerning and does not warrant a biopsy  -We discussed that given his stability in his PSA over the last several years I would recommend continued annual PSA monitoring  -He does have a questionable family history of prostate cancer though the details of this are quite unclear  -I recommend he have his PSA checked with his annual physical and he may follow-up with me on a as needed basis      Time spent: 20 minutes spent on the date of the encounter doing chart review, history and exam, documentation and further activities as noted above.    Dwayne Tomlinson MD   Urology  [facility]  Physicians  [facility] Phone: [phone]  [facility] Phone: [phone]

## 2021-12-22 NOTE — NURSING NOTE
Chief Complaint   Patient presents with     Elevated PSA     PSA check   Patient denies having any issues with urination.  Jenelle Kwok LPN

## 2021-12-22 NOTE — Clinical Note
Conor Llanes,    I followed up with Yoan today to review his PSA.  His PSA remains stable and this has been fairly stable since 2019.  Given his reassuring MRIs, I would recommend continued annual PSA monitoring which he may have done through your office at his annual physical.  I am happy to see him back anytime if there is any further rise in his PSA or any concern.    Thanks,   Dwayne Tomlinson M.D.   Cell: [phone]

## 2022-01-03 ENCOUNTER — THERAPY VISIT (OUTPATIENT)
Dept: PHYSICAL THERAPY | Facility: CLINIC | Age: 72
End: 2022-01-03
Payer: COMMERCIAL

## 2022-01-03 ENCOUNTER — TELEPHONE (OUTPATIENT)
Dept: FAMILY MEDICINE | Facility: CLINIC | Age: 72
End: 2022-01-03

## 2022-01-03 DIAGNOSIS — S76.312A PULLED HAMSTRING, LEFT, INITIAL ENCOUNTER: Primary | ICD-10-CM

## 2022-01-03 DIAGNOSIS — M79.652 PAIN OF LEFT THIGH: Primary | ICD-10-CM

## 2022-01-03 DIAGNOSIS — S76.312A PULLED HAMSTRING, LEFT, INITIAL ENCOUNTER: ICD-10-CM

## 2022-01-03 PROCEDURE — 97161 PT EVAL LOW COMPLEX 20 MIN: CPT | Mod: GP | Performed by: PHYSICAL THERAPIST

## 2022-01-03 PROCEDURE — 97530 THERAPEUTIC ACTIVITIES: CPT | Mod: GP | Performed by: PHYSICAL THERAPIST

## 2022-01-03 PROCEDURE — 97110 THERAPEUTIC EXERCISES: CPT | Mod: GP | Performed by: PHYSICAL THERAPIST

## 2022-01-03 ASSESSMENT — ACTIVITIES OF DAILY LIVING (ADL)
HOS_ADL_SCORE(%): 96.67
HOW_WOULD_YOU_RATE_YOUR_CURRENT_LEVEL_OF_FUNCTION_DURING_YOUR_USUAL_ACTIVITIES_OF_DAILY_LIVING_FROM_0_TO_100_WITH_100_BEING_YOUR_LEVEL_OF_FUNCTION_PRIOR_TO_YOUR_HIP_PROBLEM_AND_0_BEING_THE_INABILITY_TO_PERFORM_ANY_OF_YOUR_USUAL_DAILY_ACTIVITIES?: 90
WALKING_APPROXIMATELY_10_MINUTES: NO DIFFICULTY AT ALL
GETTING_INTO_AND_OUT_OF_AN_AVERAGE_CAR: NO DIFFICULTY AT ALL
RECREATIONAL_ACTIVITIES: NO DIFFICULTY AT ALL
HEAVY_WORK: SLIGHT DIFFICULTY
GOING_UP_1_FLIGHT_OF_STAIRS: NO DIFFICULTY AT ALL
PUTTING_ON_SOCKS_AND_SHOES: NO DIFFICULTY AT ALL
SITTING_FOR_15_MINUTES: NO DIFFICULTY AT ALL
TWISTING/PIVOTING_ON_INVOLVED_LEG: NO DIFFICULTY AT ALL
STANDING_FOR_15_MINUTES: NO DIFFICULTY AT ALL
STEPPING_UP_AND_DOWN_CURBS: NO DIFFICULTY AT ALL
HOS_ADL_COUNT: 15
GETTING_INTO_AND_OUT_OF_A_BATHTUB: NO DIFFICULTY AT ALL
WALKING_INITIALLY: NO DIFFICULTY AT ALL
LIGHT_TO_MODERATE_WORK: NO DIFFICULTY AT ALL
HOS_ADL_HIGHEST_POTENTIAL_SCORE: 60
DEEP_SQUATTING: SLIGHT DIFFICULTY
GOING_DOWN_1_FLIGHT_OF_STAIRS: NO DIFFICULTY AT ALL
HOS_ADL_ITEM_SCORE_TOTAL: 58
ROLLING_OVER_IN_BED: NO DIFFICULTY AT ALL
WALKING_15_MINUTES_OR_GREATER: NO DIFFICULTY AT ALL

## 2022-01-03 NOTE — TELEPHONE ENCOUNTER
Patient pulled L Hamstring in early December, has PT appointment scheduled for 3:30PM this afternoon. Patient was informed today that Medicare insurance required a PT referral from PCP for coverage of services. PT referral pended with patients preferred location.     [facility]  [address] [place] [address]

## 2022-01-03 NOTE — PROGRESS NOTES
[facility] Initial Evaluation -- Lower Extremity    Evaluation Date: January 3, 2022  Yoan Baltazar is a 71 year old male with a L posterior thigh condition.   Referral: Dr. Lopez  Work mechanical stresses: computer work, driving, lifting/carrying   Employment status: working full time (owns his own company)  Leisure mechanical stresses: tennis - 2 times per week, up to 4-5 times a week  Functional disability score: HOS 96.67  VAS score (0-10): 3/10  Patient goals/expectations:  Alleviate L posterior thigh symptoms so that he can return to tennis without pain.      HISTORY:    Present symptoms: L proximal HS, thigh to posterior knee  Pain quality (sharp/shooting/stabbing/aching/burning/cramping):  ache    Present since (onset date): early December 2021    Symptoms (improving/unchanging/worsening):  Unchanging; was better with taking 2 weeks off of playing tennis.  Has returned to tennis and now unchanging..      Symptoms commenced as a result of: playing tennis - quick start/stop with reaching for a ball   Condition occurred in the following environment: indoors     Symptoms at onset: proximal L Hamstring/insertion area    Paresthesia (yes/no):  no  Spinal history: yes - had back and L LE symptoms.  No back pain currently.   Cough/Sneeze (pos/neg):  neg    Constant symptoms:L Hamstring/mid posterior thigh  Intermittent symptoms: will move distally to posterior knee.    Symptoms are worse with the following: can feel tightness with lumbar flexion slight pain with stairs.  Lifts 3 times a week - squats, holding off on lunges.  Has been doing bridges with feet on ball and HS curl (tried after he played tennis and could already feel the symptoms - no worse afterwards).   Symptoms are better with the following: Heat, cold, rest,  No change with Thera-gun  Continued use makes the pain (better/worse/no effect): worse    Disturbed night (yes/no): no      Pain at rest (yes/no): rest  Site  (back/hip/knee/ankle/foot):  Back/hip    Other questions (swelling/clicking/locking/giving way/falling):  none     Previous episodes: 3 yrs ago (2018) felt symptoms posterior thigh when their was an odd board in the chair he was sitting on produced the HS symptoms  Low back issue about 3 years ago (2018)and did well with keke chair.   Previous treatments: rest, self massage with Thera-gun    Specific Questions:  General health (excellent/good/fair/poor):  excellent  Pertinent medical history includes: High blood pressure, high cholesterol  Medications (nil/NSAIDS/analg/steroids/anticoag/other): HTN and high cholesterol medications  Medical allergies:  none  Imaging (none/Xray/MRI/other):  none  Recent or major surgery (yes/no):  R TKA 2015, L TKA 2014 (had tibial osteotomies for each knee prior to the TKA and a 2nd surgery for the L knee after the osteotomy due to a fracture.     Night pain (yes/no):  no  Accidents (yes/no):  no  Unexplained weight loss (yes/no):  no  Barriers at home: no  Other red flags: no    Sites for physical examination (back/hip/knee/ankle/foot/other): back, hip, hamstring/thigh    EXAMINATION    Posture:  Sitting (good/fair/poor): poor    Correction of Posture (better/worse/no effect/NA): no effect  Standing (good/fair/poor): good  Other observations:  Decreased lumbar lordosis    Neurological: (NA/motor/sensory/reflexes/dural): not assessed    Baselines (pain or functional activity): pain with resisted knee flexion    Extremities (Hip / Knee / Ankle / Foot): hip, knee    Movement Loss Filiberto Mod Min Nil Pain   Flexion    x    Extension   x x    Abduction    x    Adduction        Internal Rotation    x    External Rotation    x    Dorsiflexion        Plantarflexion        Inversion        Eversion          Passive Movement (+/- over pressure)/(PDM/ERP): full passive hip and knee ROM  Resisted Test Response (pain): pain with resisted knee flexion on the L 4/5 otherwise strong and painfree,  symmetrical  Other Tests: no tenderness proximal HS insertion or medial posterior thigh    Spine:  Movement loss: mod loss EIS, nil/min loss FIS, nil loss R SGIS, nil/min loss L SGIS  Effect of repeated movements: full strength without pain with resisted knee flexion after EIL  Effect of static positioning: not tested  Spine testing (not relevant/relevant/secondary problem): relevant    Baseline Symptoms: not assessed as favorable response to lumbar EIL  Repeated Tests Symptom Response Mechanical Response   Active/Passive movement, resisted test, functional test During -  Produce, Abolish, Increase, Decrease, NE After -  Better, Worse, NB, NW, NE Effect -   ? or ? ROM, strength or key functional test No   Effect                                          Effect of static positioning                 Provisional Classification (Extremity/Spine):  Spine - Derangement - Asymmetrical, unilateral, symptoms above knee      Principle of Management:   Education:  Discussed proper sitting posture and avoidance of slouched sitting. Avoid repetitive and/or prolonged flexion.  Discussed and had pt demonstrate squat with lordotic lumbar posture; maintain good posture with strengthening exercises.  We will assess lumbar spine to determine impact on posterior thigh symptoms  If symptoms do not fully resolve, will further assess thigh if needed.  .    Equipment provided:  none  Exercise and dosage:  Pressups 10 reps, 6-8 times a day; good posture    ASSESSMENT/PLAN:    Patient is a 71 year old male with L posterior thigh complaints.    Patient has the following significant findings with corresponding treatment plan.                Diagnosis 1:  L posterior thigh pain    Pain -  manual therapy, self management, education, directional preference exercise and home program  Decreased ROM/flexibility - manual therapy, therapeutic exercise and home program  Decreased strength - therapeutic exercise, therapeutic activities and home  program  Decreased function - therapeutic activities and home program  Impaired posture - neuro re-education, therapeutic activities and home program    Therapy Evaluation Codes:   1) History comprised of:   Personal factors that impact the plan of care:      None.    Comorbidity factors that impact the plan of care are:      High blood pressure.     Medications impacting care: High blood pressure.  2) Examination of Body Systems comprised of:   Body structures and functions that impact the plan of care:      posterior thigh.   Activity limitations that impact the plan of care are:      Sports and Squatting/kneeling.  3) Clinical presentation characteristics are:   Stable/Uncomplicated.  4) Decision-Making    Low complexity using standardized patient assessment instrument and/or measureable assessment of functional outcome.  Cumulative Therapy Evaluation is: Low complexity.    Previous and current functional limitations:  (See Goal Flow Sheet for this information)    Short term and Long term goals: (See Goal Flow Sheet for this information)     Communication ability:  Patient appears to be able to clearly communicate and understand verbal and written communication and follow directions correctly.  Treatment Explanation - The following has been discussed with the patient:   RX ordered/plan of care  Anticipated outcomes  Possible risks and side effects  This patient would benefit from PT intervention to resume normal activities.   Rehab potential is good.    Frequency:  1 X week, once daily  Duration:  for 6 weeks then every other week for 8 weeks  Discharge Plan:  Achieve all LTG.  Independent in home treatment program.  Reach maximal therapeutic benefit.    Please refer to the daily flowsheet for treatment today, total treatment time and time spent performing 1:1 timed codes.

## 2022-01-04 NOTE — PROGRESS NOTES
[facility]    OUTPATIENT Physical Therapy ORTHOPEDIC EVALUATION  PLAN OF TREATMENT FOR OUTPATIENT REHABILITATION  (COMPLETE FOR INITIAL CLAIMS ONLY)  Patient's Last Name, First Name, M.I.  YOB: 1950  Yoan Baltazar    Provider s Name:  [facility]   Medical Record No.  [id number]   Start of Care Date:  01/03/22   Onset Date:    (early December 2021)   Type:     _X__PT   ___OT Medical Diagnosis:    Encounter Diagnoses   Name Primary?     Pulled hamstring, left, initial encounter      Pain of left thigh         Treatment Diagnosis:  L posterior thigh symptoms        Goals:     01/03/22 0500   Body Part   Goals listed below are for L posterior thigh   Goal #1   Goal #1 ambulation   Performance Level able to move quickly, start/stop without pain   Current Functional Level Unable to make sharp turns   Performance Level worse after 1-2 hrs of activity   STG Target Performance Hours patient will be able to walk   Performance Level 1 hour without symptoms during or after   Rationale for safe community ambulation;to promote a healthy and active lifestyle   Due Date 01/24/22    LTG Target Performance Able to make sharp turns   Performance Level quick start/stop without pain durning/after actovotu   Rationale for safe community ambulation;to promote a healthy and active lifestyle   Due Date 03/04/22       Therapy Frequency:  1 time a week  Predicted Duration of Therapy Intervention:  12 weeks    Tegan Marie, PT                 I CERTIFY THE NEED FOR THESE SERVICES FURNISHED UNDER        THIS PLAN OF TREATMENT AND WHILE UNDER MY CARE     (Physician co-signature of this document indicates review and certification of the therapy plan).                       Certification Date From:  01/03/22   Certification Date To:  04/02/22    Referring Provider:  Angus Beck  Mario Alberto    Initial Assessment        See Epic Evaluation SOC Date: 01/03/22

## 2022-01-06 ENCOUNTER — THERAPY VISIT (OUTPATIENT)
Dept: PHYSICAL THERAPY | Facility: CLINIC | Age: 72
End: 2022-01-06
Payer: COMMERCIAL

## 2022-01-06 DIAGNOSIS — M79.652 PAIN OF LEFT THIGH: ICD-10-CM

## 2022-01-06 PROCEDURE — 97035 APP MDLTY 1+ULTRASOUND EA 15: CPT | Mod: GP | Performed by: PHYSICAL THERAPIST

## 2022-01-06 PROCEDURE — 97110 THERAPEUTIC EXERCISES: CPT | Mod: GP | Performed by: PHYSICAL THERAPIST

## 2022-01-06 ASSESSMENT — ACTIVITIES OF DAILY LIVING (ADL)
DEEP_SQUATTING: SLIGHT DIFFICULTY
ROLLING_OVER_IN_BED: NO DIFFICULTY AT ALL
HOS_ADL_HIGHEST_POTENTIAL_SCORE: 60
STANDING_FOR_15_MINUTES: NO DIFFICULTY AT ALL
HEAVY_WORK: SLIGHT DIFFICULTY
HOW_WOULD_YOU_RATE_YOUR_CURRENT_LEVEL_OF_FUNCTION_DURING_YOUR_USUAL_ACTIVITIES_OF_DAILY_LIVING_FROM_0_TO_100_WITH_100_BEING_YOUR_LEVEL_OF_FUNCTION_PRIOR_TO_YOUR_HIP_PROBLEM_AND_0_BEING_THE_INABILITY_TO_PERFORM_ANY_OF_YOUR_USUAL_DAILY_ACTIVITIES?: 90
GOING_UP_1_FLIGHT_OF_STAIRS: NO DIFFICULTY AT ALL
STEPPING_UP_AND_DOWN_CURBS: NO DIFFICULTY AT ALL
HOS_ADL_COUNT: 15
WALKING_INITIALLY: NO DIFFICULTY AT ALL
LIGHT_TO_MODERATE_WORK: NO DIFFICULTY AT ALL
HOS_ADL_SCORE(%): 96.67
SITTING_FOR_15_MINUTES: NO DIFFICULTY AT ALL
GETTING_INTO_AND_OUT_OF_A_BATHTUB: NO DIFFICULTY AT ALL
HOS_ADL_ITEM_SCORE_TOTAL: 58
WALKING_15_MINUTES_OR_GREATER: NO DIFFICULTY AT ALL
WALKING_APPROXIMATELY_10_MINUTES: NO DIFFICULTY AT ALL
TWISTING/PIVOTING_ON_INVOLVED_LEG: NO DIFFICULTY AT ALL
GOING_DOWN_1_FLIGHT_OF_STAIRS: NO DIFFICULTY AT ALL
RECREATIONAL_ACTIVITIES: NO DIFFICULTY AT ALL
PUTTING_ON_SOCKS_AND_SHOES: NO DIFFICULTY AT ALL
GETTING_INTO_AND_OUT_OF_AN_AVERAGE_CAR: NO DIFFICULTY AT ALL

## 2022-01-13 ENCOUNTER — THERAPY VISIT (OUTPATIENT)
Dept: PHYSICAL THERAPY | Facility: CLINIC | Age: 72
End: 2022-01-13
Payer: COMMERCIAL

## 2022-01-13 DIAGNOSIS — M79.652 PAIN OF LEFT THIGH: ICD-10-CM

## 2022-01-13 PROCEDURE — 97110 THERAPEUTIC EXERCISES: CPT | Mod: GP | Performed by: PHYSICAL THERAPIST

## 2022-01-13 PROCEDURE — 97035 APP MDLTY 1+ULTRASOUND EA 15: CPT | Mod: GP | Performed by: PHYSICAL THERAPIST

## 2022-01-17 ENCOUNTER — THERAPY VISIT (OUTPATIENT)
Dept: PHYSICAL THERAPY | Facility: CLINIC | Age: 72
End: 2022-01-17
Payer: COMMERCIAL

## 2022-01-17 DIAGNOSIS — M79.652 PAIN OF LEFT THIGH: ICD-10-CM

## 2022-01-17 PROCEDURE — 97035 APP MDLTY 1+ULTRASOUND EA 15: CPT | Mod: GP | Performed by: PHYSICAL THERAPIST

## 2022-01-17 PROCEDURE — 97110 THERAPEUTIC EXERCISES: CPT | Mod: GP | Performed by: PHYSICAL THERAPIST

## 2022-01-17 ASSESSMENT — ACTIVITIES OF DAILY LIVING (ADL)
STEPPING_UP_AND_DOWN_CURBS: NO DIFFICULTY AT ALL
HOW_WOULD_YOU_RATE_YOUR_CURRENT_LEVEL_OF_FUNCTION_DURING_YOUR_USUAL_ACTIVITIES_OF_DAILY_LIVING_FROM_0_TO_100_WITH_100_BEING_YOUR_LEVEL_OF_FUNCTION_PRIOR_TO_YOUR_HIP_PROBLEM_AND_0_BEING_THE_INABILITY_TO_PERFORM_ANY_OF_YOUR_USUAL_DAILY_ACTIVITIES?: 99
WALKING_INITIALLY: NO DIFFICULTY AT ALL
SITTING_FOR_15_MINUTES: NO DIFFICULTY AT ALL
WALKING_APPROXIMATELY_10_MINUTES: NO DIFFICULTY AT ALL
GETTING_INTO_AND_OUT_OF_A_BATHTUB: NO DIFFICULTY AT ALL
WALKING_15_MINUTES_OR_GREATER: NO DIFFICULTY AT ALL
GOING_UP_1_FLIGHT_OF_STAIRS: NO DIFFICULTY AT ALL
HEAVY_WORK: SLIGHT DIFFICULTY
HOS_ADL_SCORE(%): 97.06
HOS_ADL_HIGHEST_POTENTIAL_SCORE: 68
ROLLING_OVER_IN_BED: NO DIFFICULTY AT ALL
LIGHT_TO_MODERATE_WORK: NO DIFFICULTY AT ALL
STANDING_FOR_15_MINUTES: NO DIFFICULTY AT ALL
TWISTING/PIVOTING_ON_INVOLVED_LEG: NO DIFFICULTY AT ALL
PUTTING_ON_SOCKS_AND_SHOES: NO DIFFICULTY AT ALL
DEEP_SQUATTING: SLIGHT DIFFICULTY
HOS_ADL_COUNT: 17
WALKING_DOWN_STEEP_HILLS: NO DIFFICULTY AT ALL
WALKING_UP_STEEP_HILLS: NO DIFFICULTY AT ALL
HOS_ADL_ITEM_SCORE_TOTAL: 66
GOING_DOWN_1_FLIGHT_OF_STAIRS: NO DIFFICULTY AT ALL
RECREATIONAL_ACTIVITIES: NO DIFFICULTY AT ALL
GETTING_INTO_AND_OUT_OF_AN_AVERAGE_CAR: NO DIFFICULTY AT ALL

## 2022-01-20 ENCOUNTER — THERAPY VISIT (OUTPATIENT)
Dept: PHYSICAL THERAPY | Facility: CLINIC | Age: 72
End: 2022-01-20
Payer: COMMERCIAL

## 2022-01-20 DIAGNOSIS — M79.652 PAIN OF LEFT THIGH: ICD-10-CM

## 2022-01-20 PROCEDURE — 97035 APP MDLTY 1+ULTRASOUND EA 15: CPT | Mod: GP | Performed by: PHYSICAL THERAPIST

## 2022-01-20 PROCEDURE — 97110 THERAPEUTIC EXERCISES: CPT | Mod: GP | Performed by: PHYSICAL THERAPIST

## 2022-01-25 ENCOUNTER — THERAPY VISIT (OUTPATIENT)
Dept: PHYSICAL THERAPY | Facility: CLINIC | Age: 72
End: 2022-01-25
Payer: COMMERCIAL

## 2022-01-25 DIAGNOSIS — M79.652 PAIN OF LEFT THIGH: ICD-10-CM

## 2022-01-25 PROCEDURE — 97035 APP MDLTY 1+ULTRASOUND EA 15: CPT | Mod: GP | Performed by: PHYSICAL THERAPIST

## 2022-01-25 PROCEDURE — 97110 THERAPEUTIC EXERCISES: CPT | Mod: GP | Performed by: PHYSICAL THERAPIST

## 2022-01-25 ASSESSMENT — ACTIVITIES OF DAILY LIVING (ADL)
HOS_ADL_COUNT: 17
SITTING_FOR_15_MINUTES: NO DIFFICULTY AT ALL
WALKING_INITIALLY: NO DIFFICULTY AT ALL
STEPPING_UP_AND_DOWN_CURBS: NO DIFFICULTY AT ALL
WALKING_APPROXIMATELY_10_MINUTES: NO DIFFICULTY AT ALL
HOS_ADL_SCORE(%): 97.06
LIGHT_TO_MODERATE_WORK: NO DIFFICULTY AT ALL
GOING_DOWN_1_FLIGHT_OF_STAIRS: NO DIFFICULTY AT ALL
TWISTING/PIVOTING_ON_INVOLVED_LEG: SLIGHT DIFFICULTY
HOS_ADL_HIGHEST_POTENTIAL_SCORE: 68
WALKING_15_MINUTES_OR_GREATER: NO DIFFICULTY AT ALL
HOS_ADL_ITEM_SCORE_TOTAL: 66
GETTING_INTO_AND_OUT_OF_A_BATHTUB: NO DIFFICULTY AT ALL
GETTING_INTO_AND_OUT_OF_AN_AVERAGE_CAR: NO DIFFICULTY AT ALL
HEAVY_WORK: SLIGHT DIFFICULTY
GOING_UP_1_FLIGHT_OF_STAIRS: NO DIFFICULTY AT ALL
DEEP_SQUATTING: NO DIFFICULTY AT ALL
STANDING_FOR_15_MINUTES: NO DIFFICULTY AT ALL
RECREATIONAL_ACTIVITIES: NO DIFFICULTY AT ALL
ROLLING_OVER_IN_BED: NO DIFFICULTY AT ALL
PUTTING_ON_SOCKS_AND_SHOES: NO DIFFICULTY AT ALL
WALKING_DOWN_STEEP_HILLS: NO DIFFICULTY AT ALL
WALKING_UP_STEEP_HILLS: NO DIFFICULTY AT ALL

## 2022-01-27 ENCOUNTER — THERAPY VISIT (OUTPATIENT)
Dept: PHYSICAL THERAPY | Facility: CLINIC | Age: 72
End: 2022-01-27
Payer: COMMERCIAL

## 2022-01-27 DIAGNOSIS — M79.652 PAIN OF LEFT THIGH: ICD-10-CM

## 2022-01-27 PROCEDURE — 97110 THERAPEUTIC EXERCISES: CPT | Mod: GP | Performed by: PHYSICAL THERAPIST

## 2022-02-01 ENCOUNTER — THERAPY VISIT (OUTPATIENT)
Dept: PHYSICAL THERAPY | Facility: CLINIC | Age: 72
End: 2022-02-01
Payer: COMMERCIAL

## 2022-02-01 DIAGNOSIS — M79.652 PAIN OF LEFT THIGH: ICD-10-CM

## 2022-02-01 PROCEDURE — 97110 THERAPEUTIC EXERCISES: CPT | Mod: GP | Performed by: PHYSICAL THERAPIST

## 2022-02-10 ENCOUNTER — TRANSFERRED RECORDS (OUTPATIENT)
Dept: HEALTH INFORMATION MANAGEMENT | Facility: CLINIC | Age: 72
End: 2022-02-10

## 2022-02-14 ENCOUNTER — THERAPY VISIT (OUTPATIENT)
Dept: PHYSICAL THERAPY | Facility: CLINIC | Age: 72
End: 2022-02-14
Payer: COMMERCIAL

## 2022-02-14 DIAGNOSIS — M79.652 PAIN OF LEFT THIGH: ICD-10-CM

## 2022-02-14 PROCEDURE — 97140 MANUAL THERAPY 1/> REGIONS: CPT | Mod: GP | Performed by: PHYSICAL THERAPIST

## 2022-02-14 PROCEDURE — 97110 THERAPEUTIC EXERCISES: CPT | Mod: GP | Performed by: PHYSICAL THERAPIST

## 2022-02-15 NOTE — PROGRESS NOTES
PROGRESS  REPORT    Progress reporting period is from 1/3/22 to 2/14/2022.       SUBJECTIVE  Patient notes that he has been feeling the symptoms in his L glute when he feels them.  Has not been feeling the symptoms into the hamstring region.  He can feel the symptoms when he is playing tennis if he has to do any quick movements; start stop, fast change of direction.  No lasting symptoms after he is done playing.  He did resume his strengthening program yesterday without symptoms.  He does feel that he is 85-90% improved since beginning PT.    He will be out of town this week and next.  Will return to PT if not able to self manage his symptoms with his home program     Current pain level is 0/10  .     Previous pain level was  3/10  .   Changes in function:  Yes (See Goal flowsheet attached for changes in current functional level)  Adverse reaction to treatment or activity: None    OBJECTIVE  Changes noted in objective findings:  Yes, improved pain, improved strength for resisted hip extension and knee flexion, improving lumbar range of motion, improving function.   Poor postural habits but now will self correct to good.  Difficult to maintain.  Patient could feel symptoms with resisted seated knee flexion in the hamstring region, with prone hip extension and mild with prone knee flexion.   He was strong and painfree for all 3 tests after lumbar extension ROM exercises and lumbar extension mobilization.  HOS score is at 97.06 with slight improvement since evaluation 96.67.  Non antalgic gait  Lumbar extension ROM will improve with extension mobilization.           ASSESSMENT/PLAN  Updated problem list and treatment plan: Diagnosis 1:  L posterior thigh pain  (appears to be a referral from the lumbar spine)    Pain -  manual therapy, self management, education, directional preference exercise and home program  Decreased ROM/flexibility - manual therapy, therapeutic exercise and home program  Decreased joint mobility -  manual therapy, therapeutic exercise and home program  Decreased function - therapeutic activities and home program  Impaired posture - neuro re-education, therapeutic activities and home program  STG/LTGs have been met or progress has been made towards goals:  Yes (See Goal flow sheet completed today.)  Assessment of Progress: The patient's condition is improving.  The patient's condition has potential to improve.  Patient is meeting short term goals and is progressing towards long term goals.  Self Management Plans:  Patient has been instructed in a home treatment program.  Patient  has been instructed in self management of symptoms.  I have re-evaluated this patient and find that the nature, scope, duration and intensity of the therapy is appropriate for the medical condition of the patient.  Yoan continues to require the following intervention to meet STG and LTG's:  PT    Recommendations:  This patient would benefit from continued therapy.     Frequency:  2 X a month, once daily  Duration:  for 2 months        Please refer to the daily flowsheet for treatment today, total treatment time and time spent performing 1:1 timed codes.

## 2022-04-10 DIAGNOSIS — E78.5 HYPERLIPIDEMIA LDL GOAL <130: ICD-10-CM

## 2022-04-12 RX ORDER — SIMVASTATIN 40 MG
TABLET ORAL
Qty: 90 TABLET | Refills: 3 | Status: SHIPPED | OUTPATIENT
Start: 2022-04-12 | End: 2023-03-21

## 2022-04-12 NOTE — TELEPHONE ENCOUNTER
Routing refill request to provider for review/approval because:  Labs not current:  ldl  Tia MITCHELL RN, BSN

## 2022-04-17 ENCOUNTER — HEALTH MAINTENANCE LETTER (OUTPATIENT)
Age: 72
End: 2022-04-17

## 2022-06-13 PROBLEM — M79.652 PAIN OF LEFT THIGH: Status: RESOLVED | Noted: 2022-01-03 | Resolved: 2022-06-13

## 2022-06-13 NOTE — PROGRESS NOTES
Patient did not need to return for further treatment.  Please refer to the progress note and goal flowsheet completed on 02/14/22 for discharge information.

## 2022-07-10 ENCOUNTER — MYC MEDICAL ADVICE (OUTPATIENT)
Dept: FAMILY MEDICINE | Facility: CLINIC | Age: 72
End: 2022-07-10

## 2022-07-11 ASSESSMENT — ENCOUNTER SYMPTOMS
WEAKNESS: 0
FREQUENCY: 0
JOINT SWELLING: 0
HEADACHES: 0
ARTHRALGIAS: 0
CONSTIPATION: 0
PALPITATIONS: 0
NAUSEA: 0
SHORTNESS OF BREATH: 0
NERVOUS/ANXIOUS: 0
HEMATOCHEZIA: 0
ABDOMINAL PAIN: 0
MYALGIAS: 0
FEVER: 0
COUGH: 0
CHILLS: 0
HEMATURIA: 0
DIARRHEA: 0
DIZZINESS: 0
DYSURIA: 0
EYE PAIN: 0
HEARTBURN: 0
PARESTHESIAS: 0
SORE THROAT: 0

## 2022-07-11 ASSESSMENT — ACTIVITIES OF DAILY LIVING (ADL): CURRENT_FUNCTION: NO ASSISTANCE NEEDED

## 2022-07-14 NOTE — PROGRESS NOTES
"Yoan is a 71 year old who is being evaluated via a billable video visit.      How would you like to obtain your AVS? MyChart  If the video visit is dropped, the invitation should be resent by: Text to cell phone: [phone]  Will anyone else be joining your video visit? No        Subjective   Yoan is a 71 year old, presenting for the following health issues:  Physical      HPI     Annual Wellness Visit    Patient has been advised of split billing requirements and indicates understanding: Yes     Are you in the first 12 months of your Medicare Part B coverage?  No    Physical Health:  Healthy Habits:     In general, how would you rate your overall health?  Excellent    Frequency of exercise:  4-5 days/week    Duration of exercise:  Greater than 60 minutes    Do you usually eat at least 4 servings of fruit and vegetables a day, include whole grains    & fiber and avoid regularly eating high fat or \"junk\" foods?  No    Taking medications regularly:  Yes    Medication side effects:  None    Ability to successfully perform activities of daily living:  No assistance needed    Home Safety:  No safety concerns identified    Hearing Impairment:  Difficulty following a conversation in a noisy restaurant or crowded room, need to ask people to speak up or repeat themselves and difficulty understanding soft or whispered speech    In the past 6 months, have you been bothered by leaking of urine?  No    In general, how would you rate your overall mental or emotional health?  Excellent      PHQ-2 Total Score: 0    Additional concerns today:  No  There were no vitals taken for this visit.  Weight: Unable to obtain due to video visit  Height: Unable to obtain due to video visit  BMI: Unable to obtain due to video visit  Blood Pressure: Provided by patient 136//72    Mental Health:    In general, how would you rate your overall mental or emotional health? excellent  PHQ-2 Score: 0    Do you feel safe in your environment? Yes    Have " you ever done Advance Care Planning? (For example, a Health Directive, POLST, or a discussion with a medical provider or your loved ones about your wishes)? Yes, advance care planning is on file.    Fall risk:  Fallen 2 or more times in the past year?: No  Any fall with injury in the past year?: No    Cognitive Screening: Unable to complete due to virtual visit; need for additional assessment in future face-to-face visit    Do you have sleep apnea, excessive snoring or daytime drowsiness?: no    Current providers sharing in care for this patient include:   Patient Care Team:  Angus Llanes MD as PCP - General (Internal Medicine)  Yoan Oates MD as MD (Orthopedics)  Angus Llanes MD as Assigned PCP  Dwayne Tomlinson MD as Assigned Surgical Provider    Patient has been advised of split billing requirements and indicates understanding: Yes      Review of Systems   Constitutional, HEENT, cardiovascular, pulmonary, GI, , musculoskeletal, neuro, skin, endocrine and psych systems are negative, except as otherwise noted.      Objective           Vitals:  No vitals were obtained today due to virtual visit.    Physical Exam   GENERAL: Healthy, alert and no distress  EYES: Eyes grossly normal to inspection.  No discharge or erythema, or obvious scleral/conjunctival abnormalities.  RESP: No audible wheeze, cough, or visible cyanosis.  No visible retractions or increased work of breathing.    SKIN: Visible skin clear. No significant rash, abnormal pigmentation or lesions.  NEURO: Cranial nerves grossly intact.  Mentation and speech appropriate for age.  PSYCH: Mentation appears normal, affect normal/bright, judgement and insight intact, normal speech and appearance well-groomed.    (Z00.00) Routine general medical examination at a health care facility  (primary encounter diagnosis)  Comment:  For routine exam, we will draw labs as ordered, cholesterol, diabetes mellitus check, liver function, renal  function, PSA.  We will also update vaccination history.  Plan: Albumin Random Urine Quantitative with Creat         Ratio, COMPREHENSIVE METABOLIC PANEL, CBC with         Platelets, PSA, screen            (Z13.220) Screening for hyperlipidemia  Comment: check fasting lipid panel   Plan: Lipid panel reflex to direct LDL Fasting            (I10) Primary hypertension  Comment: continue losartan, hydrochlorothiazide and amlodipine  Plan:     (E78.5) Hyperlipidemia LDL goal <130  Comment: check fasting lipid panel when   Plan:     (N18.30) Stage 3 chronic kidney disease, unspecified whether stage 3a or 3b CKD (H)  Comment: check labs when able  Plan:     (Z12.5) Encounter for screening for malignant neoplasm of prostate   Comment:   Plan: PSA, screen                   Video-Visit Details    Video Start Time: 3:11 PM    Type of service:  Video Visit    Video End Time:3:51PM    Originating Location (pt. Location): Home    Distant Location (provider location):  [facility]     Platform used for Video Visit: [employer]    .  [place]

## 2022-07-15 ENCOUNTER — VIRTUAL VISIT (OUTPATIENT)
Dept: FAMILY MEDICINE | Facility: CLINIC | Age: 72
End: 2022-07-15
Payer: COMMERCIAL

## 2022-07-15 ENCOUNTER — MYC MEDICAL ADVICE (OUTPATIENT)
Dept: FAMILY MEDICINE | Facility: CLINIC | Age: 72
End: 2022-07-15

## 2022-07-15 DIAGNOSIS — Z00.00 ROUTINE GENERAL MEDICAL EXAMINATION AT A HEALTH CARE FACILITY: Primary | ICD-10-CM

## 2022-07-15 DIAGNOSIS — Z13.220 SCREENING FOR HYPERLIPIDEMIA: ICD-10-CM

## 2022-07-15 DIAGNOSIS — Z12.5 ENCOUNTER FOR SCREENING FOR MALIGNANT NEOPLASM OF PROSTATE: ICD-10-CM

## 2022-07-15 DIAGNOSIS — I10 PRIMARY HYPERTENSION: ICD-10-CM

## 2022-07-15 DIAGNOSIS — N18.30 STAGE 3 CHRONIC KIDNEY DISEASE, UNSPECIFIED WHETHER STAGE 3A OR 3B CKD (H): ICD-10-CM

## 2022-07-15 DIAGNOSIS — E78.5 HYPERLIPIDEMIA LDL GOAL <130: ICD-10-CM

## 2022-07-15 PROCEDURE — G0439 PPPS, SUBSEQ VISIT: HCPCS | Mod: 95 | Performed by: INTERNAL MEDICINE

## 2022-07-19 ENCOUNTER — MYC MEDICAL ADVICE (OUTPATIENT)
Dept: FAMILY MEDICINE | Facility: CLINIC | Age: 72
End: 2022-07-19

## 2022-07-19 DIAGNOSIS — R05.9 COUGH: Primary | ICD-10-CM

## 2022-07-20 ENCOUNTER — LAB (OUTPATIENT)
Dept: LAB | Facility: CLINIC | Age: 72
End: 2022-07-20
Payer: COMMERCIAL

## 2022-07-20 DIAGNOSIS — Z12.5 ENCOUNTER FOR SCREENING FOR MALIGNANT NEOPLASM OF PROSTATE: ICD-10-CM

## 2022-07-20 DIAGNOSIS — Z13.220 SCREENING FOR HYPERLIPIDEMIA: ICD-10-CM

## 2022-07-20 DIAGNOSIS — Z00.00 ROUTINE GENERAL MEDICAL EXAMINATION AT A HEALTH CARE FACILITY: ICD-10-CM

## 2022-07-20 LAB
ERYTHROCYTE [DISTWIDTH] IN BLOOD BY AUTOMATED COUNT: 12.9 % (ref 10–15)
HCT VFR BLD AUTO: 44.1 % (ref 40–53)
HGB BLD-MCNC: 15 G/DL (ref 13.3–17.7)
MCH RBC QN AUTO: 29.4 PG (ref 26.5–33)
MCHC RBC AUTO-ENTMCNC: 34 G/DL (ref 31.5–36.5)
MCV RBC AUTO: 86 FL (ref 78–100)
PLATELET # BLD AUTO: 244 10E3/UL (ref 150–450)
RBC # BLD AUTO: 5.11 10E6/UL (ref 4.4–5.9)
WBC # BLD AUTO: 5.7 10E3/UL (ref 4–11)

## 2022-07-20 PROCEDURE — 80061 LIPID PANEL: CPT

## 2022-07-20 PROCEDURE — 85027 COMPLETE CBC AUTOMATED: CPT

## 2022-07-20 PROCEDURE — G0103 PSA SCREENING: HCPCS

## 2022-07-20 PROCEDURE — 80053 COMPREHEN METABOLIC PANEL: CPT

## 2022-07-20 PROCEDURE — 36415 COLL VENOUS BLD VENIPUNCTURE: CPT

## 2022-07-20 PROCEDURE — 82043 UR ALBUMIN QUANTITATIVE: CPT

## 2022-07-21 LAB
ALBUMIN SERPL-MCNC: 4.2 G/DL (ref 3.4–5)
ALP SERPL-CCNC: 81 U/L (ref 40–150)
ALT SERPL W P-5'-P-CCNC: 28 U/L (ref 0–70)
ANION GAP SERPL CALCULATED.3IONS-SCNC: 7 MMOL/L (ref 3–14)
AST SERPL W P-5'-P-CCNC: 21 U/L (ref 0–45)
BILIRUB SERPL-MCNC: 0.5 MG/DL (ref 0.2–1.3)
BUN SERPL-MCNC: 16 MG/DL (ref 7–30)
CALCIUM SERPL-MCNC: 9.4 MG/DL (ref 8.5–10.1)
CHLORIDE BLD-SCNC: 107 MMOL/L (ref 94–109)
CHOLEST SERPL-MCNC: 144 MG/DL
CO2 SERPL-SCNC: 27 MMOL/L (ref 20–32)
CREAT SERPL-MCNC: 1.11 MG/DL (ref 0.66–1.25)
CREAT UR-MCNC: 90 MG/DL
FASTING STATUS PATIENT QL REPORTED: NO
GFR SERPL CREATININE-BSD FRML MDRD: 71 ML/MIN/1.73M2
GLUCOSE BLD-MCNC: 112 MG/DL (ref 70–99)
HDLC SERPL-MCNC: 35 MG/DL
LDLC SERPL CALC-MCNC: 75 MG/DL
MICROALBUMIN UR-MCNC: <5 MG/L
MICROALBUMIN/CREAT UR: NORMAL MG/G{CREAT}
NONHDLC SERPL-MCNC: 109 MG/DL
POTASSIUM BLD-SCNC: 4 MMOL/L (ref 3.4–5.3)
PROT SERPL-MCNC: 7.4 G/DL (ref 6.8–8.8)
PSA SERPL-MCNC: 4.01 UG/L (ref 0–4)
SODIUM SERPL-SCNC: 141 MMOL/L (ref 133–144)
TRIGL SERPL-MCNC: 168 MG/DL

## 2022-07-21 RX ORDER — AZITHROMYCIN 250 MG/1
TABLET, FILM COATED ORAL
Qty: 6 TABLET | Refills: 0 | Status: SHIPPED | OUTPATIENT
Start: 2022-07-21 | End: 2022-07-26

## 2022-07-22 ENCOUNTER — TELEPHONE (OUTPATIENT)
Dept: FAMILY MEDICINE | Facility: CLINIC | Age: 72
End: 2022-07-22

## 2022-07-22 NOTE — TELEPHONE ENCOUNTER
Can we call Yoan Baltazar and let him know that     Hi Yoan,    I had the opportunity to review your recent labs and a summary of your labs reads as follows:    Your complete blood counts show no sign of anemia, normal white blood cell count and platelets.  Your comprehensive metabolic panel showed normal renal function, normal liver function, and stable elevated fasting blood glucose indicating no evidence of diabetes mellitus.  Your fasting lipid panel show  -Low HDL (good) cholesterol -as your goal is greater than 40  - low LDL (bad) cholesterol as your goal is less than 100 -continue simvastatin  -Stable triglyceride levels    Your PSA is slightly elevated, I recommend rechecking again in the next 2 to 3 weeks    Sincerely,  Angus Llanes MD

## 2022-07-22 NOTE — RESULT ENCOUNTER NOTE
Conor Milton,    I had the opportunity to review your recent labs and a summary of your labs reads as follows:    Your complete blood counts show no sign of anemia, normal white blood cell count and platelets.  Your comprehensive metabolic panel showed normal renal function, normal liver function, and stable elevated fasting blood glucose indicating no evidence of diabetes mellitus.  Your fasting lipid panel show  -Low HDL (good) cholesterol -as your goal is greater than 40  - low LDL (bad) cholesterol as your goal is less than 100 -continue simvastatin  -Stable triglyceride levels    Your PSA is slightly elevated, I recommend rechecking again in the next 2 to 3 weeks    Sincerely,  Angus Llanes MD

## 2022-07-26 NOTE — TELEPHONE ENCOUNTER
Pt has reviewed results/notes on MyChart:     Written by Angus Llanes MD on 7/22/2022  4:55 PM CDT  Seen by patient Yoan Baltazar on 7/23/2022  8:04 AM

## 2022-08-01 ENCOUNTER — TRANSFERRED RECORDS (OUTPATIENT)
Dept: FAMILY MEDICINE | Facility: CLINIC | Age: 72
End: 2022-08-01

## 2022-08-22 ENCOUNTER — TRANSFERRED RECORDS (OUTPATIENT)
Dept: FAMILY MEDICINE | Facility: CLINIC | Age: 72
End: 2022-08-22

## 2022-08-25 ENCOUNTER — TRANSCRIBE ORDERS (OUTPATIENT)
Dept: OTHER | Age: 72
End: 2022-08-25

## 2022-08-25 DIAGNOSIS — Z98.890 S/P RIGHT ROTATOR CUFF REPAIR: Primary | ICD-10-CM

## 2022-09-14 ENCOUNTER — TRANSFERRED RECORDS (OUTPATIENT)
Dept: HEALTH INFORMATION MANAGEMENT | Facility: CLINIC | Age: 72
End: 2022-09-14

## 2022-09-19 ENCOUNTER — TRANSFERRED RECORDS (OUTPATIENT)
Dept: HEALTH INFORMATION MANAGEMENT | Facility: CLINIC | Age: 72
End: 2022-09-19

## 2022-09-29 ENCOUNTER — THERAPY VISIT (OUTPATIENT)
Dept: PHYSICAL THERAPY | Facility: CLINIC | Age: 72
End: 2022-09-29
Attending: NURSE PRACTITIONER
Payer: COMMERCIAL

## 2022-09-29 DIAGNOSIS — S46.011A TRAUMATIC INCOMPLETE TEAR OF RIGHT ROTATOR CUFF, INITIAL ENCOUNTER: ICD-10-CM

## 2022-09-29 DIAGNOSIS — M25.511 CHRONIC RIGHT SHOULDER PAIN: ICD-10-CM

## 2022-09-29 DIAGNOSIS — G89.29 CHRONIC RIGHT SHOULDER PAIN: ICD-10-CM

## 2022-09-29 PROCEDURE — 97035 APP MDLTY 1+ULTRASOUND EA 15: CPT | Mod: GP | Performed by: PHYSICAL THERAPIST

## 2022-09-29 PROCEDURE — 97110 THERAPEUTIC EXERCISES: CPT | Mod: GP | Performed by: PHYSICAL THERAPIST

## 2022-09-29 PROCEDURE — 97161 PT EVAL LOW COMPLEX 20 MIN: CPT | Mod: GP | Performed by: PHYSICAL THERAPIST

## 2022-09-29 NOTE — PROGRESS NOTES
[facility]    OUTPATIENT Physical Therapy ORTHOPEDIC EVALUATION  PLAN OF TREATMENT FOR OUTPATIENT REHABILITATION  (COMPLETE FOR INITIAL CLAIMS ONLY)  Patient's Last Name, First Name, M.I.  YOB: 1950  Yoan Baltazar    Provider s Name:  [facility]   Medical Record No.  [id number]   Start of Care Date:  09/29/22   Onset Date:    (August 2021)   Type:     _X__PT   ___OT Medical Diagnosis:    Encounter Diagnoses   Name Primary?    Chronic right shoulder pain     Traumatic incomplete tear of right rotator cuff, initial encounter         Treatment Diagnosis:  R shoulder RCT        Goals:     09/29/22 0500   Body Part   Goals listed below are for R shoulder   Goal #1   Goal #1 reaching   Previous Functional Level No restrictions   Current Functional Level Can reach ;overhead;out to the side   Performance level with intermittent pain   STG Target Performance Reach ;overhead;out to the side   Performance level painfree   Rationale for independent personal hygiene;for dressing;for bathing   Due date 10/20/22   LTG Target Performance Unrestricted reaching  (and able to stabilize with R shoulder with tennis without pain)   Rationale for dressing;for independent personal hygiene  (tennis painfree and without ache afterwards)   Due date 12/27/22         Therapy Frequency:  1 time a week for 6 weeks then every other week for 6 weeks  Predicted Duration of Therapy Intervention:  12    Tegan Marie, PT                 I CERTIFY THE NEED FOR THESE SERVICES FURNISHED UNDER        THIS PLAN OF TREATMENT AND WHILE UNDER MY CARE .             Physician Signature               Date    X_____________________________________________________                         Certification Date From:  09/29/22   Certification Date To:  12/27/22    Referring Provider:  Anuja PETIT  Helder    Initial Assessment        See Epic Evaluation SOC Date: 09/29/22

## 2022-09-29 NOTE — PROGRESS NOTES
[facility] Initial Evaluation -- Upper Extremity    Evaluation Date: September 29, 2022  Yoan Baltazar is a 71 year old male with a R shoulder condition.   Referral: Anuja Pendleton NP  Work mechanical stresses: computer work, driving, Lifting/carrying, prolonged sitting  Employment status:  Working full-time- /president   Leisure mechanical stresses: tennis - 2-3 times a week - competitive  Functional disability score (SPADI): 14.65  VAS score (0-10): 1/10   Handedness (R/L):  R   Patient goals/expectations:  Alleviate pain for the R shoulder, instruct in HEP to allow every day activity and tennis painfree. Prevent surgery    HISTORY    Present symptoms: Patient notes that he will feel intermittent R shoulder symptoms.  He may feel symptoms with deceleration of the arm from overhead, reaching out when taking a shot -the stabilization portion of the shoulder.  R shoulder will fatigue with serving.  Shoulder feels stronger since his cortisone injection for the shoulder (9/19/2022).    Symptoms may be felt at the top of the shoulder, posterior shoulder into the proximal arm, feeling lateral/anterior symptoms (Bicep region)  Pain quality (sharp/shooting/stabbing/aching/burning/cramping):  Ache     Present since (onset date):  August 2021    Symptoms (improving/unchanging/worsening):  Improving since the injection; prior to the injection he had good/bad days.    Symptoms commenced as a result of: fell during a tennis tournament   Condition occurred in the following environment: tennis court     Symptoms at onset: unable to raise his arm - top/latera/posterior shoulder  Paresthesia (yes/no):  no  Spinal history: had one incident of trauma to the neck after a dismount from gymnastic rings and landed on the back of his neck/shoulders - no lasting symptoms      Cough/Sneeze (pos/neg):  no    Constant symptoms: none  Intermittent symptoms: lateral shoulder, posterior shoulder and arm, bicep  region    Symptoms are worse with the following: Sometimes Reaching, Sometimes On the move, playing tennis (will be sore for a few hours after playing), time of day does not affect his symptoms.  Laying on the R shoulder (better since injection   Symptoms are better with the following: not performing the activities that produce     Continued use makes the pain (better/worse/no effect): worse    Disturbed night (yes/no):  No - can feel it if he wakes, but does not think the shoulder wakes him    Pain at rest (yes/no): no  Site (neck/shoulder/elbow/wrist/hand): neck, shoulder    Other questions (swelling/catching/clicking/locking/subluxing):  Intermittent clicking, quick small pain then goes right away - bringing arm down from overhead    Previous episodes: none  Previous treatments: cortisone injection 9/19/2022.  Ice has helped after playing tennis    Specific Questions:  General health (excellent/good/fair/poor):  excellent  Pertinent medical history includes: High blood pressure  Medications (nil/NSAIDS/analg/steroids/anticoag/other):  Other - High blood pressure  Medical allergies:  ragweed  Imaging (None/Xray/MRI/Other): MRI - RCT; X-rays unremarkable  Recent or major surgery (yes/no): no  Night pain (yes/no): no  Accidents (yes/no): no  Unexplained weight loss (yes/no): no  Barriers at home: no  Other red flags: no    Sites for physical examination (neck/shoulder/elbow/wrist/hand): shoulder, neck    EXAMINATION    Posture:  Sitting (good/fair/poor): poor  Correction of posture (better/worse/no effect/NA): no effect  Standing (good/fair/poor): fair  Other observations:  Forward head posture and increased thoracic kyphosis    Neurological (NA/motor/sensory/reflexes/dural): not assessed    Baselines (pain or functional activity): pain with resisted shoulder ER, IR    Extremities (Shoulder/Elbow/Wrist/Hand): R shoulder    Movement Loss Filiberto Mod Min Nil Pain   Flexion    x    Extension    x    Abduction    x     Internal Rotation   x x Strain anterior shoulder   External Rotation    x    Supination        Pronation        Radial Deviation        Ulnar Deviation           Passive Movement (+/- overpressure)/(PDM/ERP):  Full with strain at end range  Resisted Test Response (pain): 5/5 throughout shoulder - resisted ER is most painful, mild pain with resisted abduction and IR  Other Tests: Hypomobility lower cervical area and upper thoracic   Pain with lift off and with resisted shoulder abduction with thumb down  Spine:  Movement Loss: major loss retraction and extension  Effect of repeated movements: no effect on MMT with rep retraction, or extension.    Effect of static positioning: not assessed  Spine testing (not relevant/relevant/secondary problem): inconclusive    Baseline Symptoms: not assessed  Repeated Tests Symptom Response Mechanical Response   Active/Passive movement, resisted test, functional test During - Produce, Abolish, Increase, Decrease, NE After -   Better, Worse, NB, NW, NE Effect -   ? or ? ROM, strength or key functional test No Effect                               Effect of static positioning                  Provisional Classification (Extremity/Spine): Extremity - Inconclusive/Other - Structurally Compromised (RCT)  Hypo mobility of the lower cx and upper thx spine  Principle of Management:  Education:  Discussed proper sitting posture and had pt sit with rolled towel to support his thoracic spine; this will help with spine posture and allow shoulders to be in a more neutral position.  Avoid slouched sitting.  With strengthening, only work as hard as able without pain.    Equipment provided:  none  Exercise and dosage:  Neck retraction 10 reps, 4-6 times a day.  Isometric shoulder strengthening 10 reps, gentle hold, 1-2 times a day    ASSESSMENT/PLAN:    Patient is a 71 year old male with right side shoulder complaints.    Patient has the following significant findings with corresponding treatment  plan.                Diagnosis 1:  R shoulder pain, RCT    Pain -  manual therapy, self management, education, directional preference exercise and home program  Decreased ROM/flexibility - manual therapy, therapeutic exercise and home program  Decreased joint mobility - manual therapy, therapeutic exercise and home program  Impaired muscle performance - neuro re-education and home program  Decreased function - therapeutic activities and home program  Impaired posture - neuro re-education, therapeutic activities and home program    Therapy Evaluation Codes:   1) History comprised of:   Personal factors that impact the plan of care:      None.    Comorbidity factors that impact the plan of care are:      High blood pressure.     Medications impacting care: High blood pressure.  2) Examination of Body Systems comprised of:   Body structures and functions that impact the plan of care:      Cervical spine, Shoulder and Thoracic Spine.   Activity limitations that impact the plan of care are:      Dressing, Lifting, Sports and reaching.  3) Clinical presentation characteristics are:   Stable/Uncomplicated.  4) Decision-Making    Low complexity using standardized patient assessment instrument and/or measureable assessment of functional outcome.  Cumulative Therapy Evaluation is: Low complexity.    Previous and current functional limitations:  (See Goal Flow Sheet for this information)    Short term and Long term goals: (See Goal Flow Sheet for this information)     Communication ability:  Patient appears to be able to clearly communicate and understand verbal and written communication and follow directions correctly.  Treatment Explanation - The following has been discussed with the patient:   RX ordered/plan of care  Anticipated outcomes  Possible risks and side effects  This patient would benefit from PT intervention to resume normal activities.   Rehab potential is excellent.    Frequency:  1 X week, once  daily  Duration:  for 6 weeks tapering to every other week over 6 weeks  Discharge Plan:  Achieve all LTG.  Independent in home treatment program.  Reach maximal therapeutic benefit.    Please refer to the daily flowsheet for treatment today, total treatment time and time spent performing 1:1 timed codes.   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

## 2022-10-06 ENCOUNTER — THERAPY VISIT (OUTPATIENT)
Dept: PHYSICAL THERAPY | Facility: CLINIC | Age: 72
End: 2022-10-06
Payer: COMMERCIAL

## 2022-10-06 DIAGNOSIS — M25.511 CHRONIC RIGHT SHOULDER PAIN: Primary | ICD-10-CM

## 2022-10-06 DIAGNOSIS — S46.011D TRAUMATIC INCOMPLETE TEAR OF RIGHT ROTATOR CUFF, SUBSEQUENT ENCOUNTER: ICD-10-CM

## 2022-10-06 DIAGNOSIS — G89.29 CHRONIC RIGHT SHOULDER PAIN: Primary | ICD-10-CM

## 2022-10-06 PROCEDURE — 97140 MANUAL THERAPY 1/> REGIONS: CPT | Mod: GP | Performed by: PHYSICAL THERAPIST

## 2022-10-06 PROCEDURE — 97530 THERAPEUTIC ACTIVITIES: CPT | Mod: GP | Performed by: PHYSICAL THERAPIST

## 2022-10-06 PROCEDURE — 97110 THERAPEUTIC EXERCISES: CPT | Mod: GP | Performed by: PHYSICAL THERAPIST

## 2022-10-07 ENCOUNTER — TRANSFERRED RECORDS (OUTPATIENT)
Dept: HEALTH INFORMATION MANAGEMENT | Facility: CLINIC | Age: 72
End: 2022-10-07

## 2022-10-13 ENCOUNTER — THERAPY VISIT (OUTPATIENT)
Dept: PHYSICAL THERAPY | Facility: CLINIC | Age: 72
End: 2022-10-13
Payer: COMMERCIAL

## 2022-10-13 DIAGNOSIS — G89.29 CHRONIC RIGHT SHOULDER PAIN: Primary | ICD-10-CM

## 2022-10-13 DIAGNOSIS — M25.511 CHRONIC RIGHT SHOULDER PAIN: Primary | ICD-10-CM

## 2022-10-13 DIAGNOSIS — S46.011D TRAUMATIC INCOMPLETE TEAR OF RIGHT ROTATOR CUFF, SUBSEQUENT ENCOUNTER: ICD-10-CM

## 2022-10-13 PROCEDURE — 97110 THERAPEUTIC EXERCISES: CPT | Mod: GP | Performed by: PHYSICAL THERAPIST

## 2022-10-13 PROCEDURE — 97140 MANUAL THERAPY 1/> REGIONS: CPT | Mod: GP | Performed by: PHYSICAL THERAPIST

## 2022-10-21 ENCOUNTER — THERAPY VISIT (OUTPATIENT)
Dept: PHYSICAL THERAPY | Facility: CLINIC | Age: 72
End: 2022-10-21
Payer: COMMERCIAL

## 2022-10-21 DIAGNOSIS — S46.011D TRAUMATIC INCOMPLETE TEAR OF RIGHT ROTATOR CUFF, SUBSEQUENT ENCOUNTER: ICD-10-CM

## 2022-10-21 DIAGNOSIS — M25.511 CHRONIC RIGHT SHOULDER PAIN: Primary | ICD-10-CM

## 2022-10-21 DIAGNOSIS — G89.29 CHRONIC RIGHT SHOULDER PAIN: Primary | ICD-10-CM

## 2022-10-21 PROCEDURE — 97110 THERAPEUTIC EXERCISES: CPT | Mod: GP | Performed by: PHYSICAL THERAPIST

## 2022-10-21 PROCEDURE — 97140 MANUAL THERAPY 1/> REGIONS: CPT | Mod: GP | Performed by: PHYSICAL THERAPIST

## 2022-10-23 ENCOUNTER — HEALTH MAINTENANCE LETTER (OUTPATIENT)
Age: 72
End: 2022-10-23

## 2022-11-03 ENCOUNTER — THERAPY VISIT (OUTPATIENT)
Dept: PHYSICAL THERAPY | Facility: CLINIC | Age: 72
End: 2022-11-03
Payer: COMMERCIAL

## 2022-11-03 DIAGNOSIS — S46.011D TRAUMATIC INCOMPLETE TEAR OF RIGHT ROTATOR CUFF, SUBSEQUENT ENCOUNTER: ICD-10-CM

## 2022-11-03 DIAGNOSIS — G89.29 CHRONIC RIGHT SHOULDER PAIN: Primary | ICD-10-CM

## 2022-11-03 DIAGNOSIS — M25.511 CHRONIC RIGHT SHOULDER PAIN: Primary | ICD-10-CM

## 2022-11-03 PROBLEM — S46.011A TRAUMATIC INCOMPLETE TEAR OF RIGHT ROTATOR CUFF: Status: RESOLVED | Noted: 2022-09-29 | Resolved: 2022-11-03

## 2022-11-03 PROCEDURE — 97110 THERAPEUTIC EXERCISES: CPT | Mod: GP | Performed by: PHYSICAL THERAPIST

## 2022-11-03 NOTE — PROGRESS NOTES
DISCHARGE REPORT    Progress reporting period is from 9/29/2022 to 11/3/2022.       SUBJECTIVE  Patient relates that his R shoulder is 50-60% improved.  He is now able to play tennis 3 days a week without shoulder pain.  He will not have symptoms after playing or the following day.  He can feel the should if he sleeps on it, but the symptoms resolve when he gets up and moves around.  He does have intermittent symptoms when washing his hair in the morning, but again, no lasting symptoms  He does feel that his shoulder is doing well and feels that he can continue with his home program at this time.    Current Pain level: 0/10.     Previous pain level was  1/10  .   Changes in function:  Yes (See Goal flowsheet attached for changes in current functional level)  Adverse reaction to treatment or activity: None    OBJECTIVE  Changes noted in objective findings:  Yes, improving posture, Improved ROM, improved strength and improving function.  Independent with HEP  Objective:   Patient has limited IR behind his back, 1 thumb breath less than the L UE with symptoms 1-2/10, otherwise full and painfree R shoulder ROM.   IR ROM improved after pec stretching and shoulder flexion with thoracic extension by 1/2 thumb length and less pain   He has full strength with MMT, (+/-) with abduction and ER with external rotation more uncomfortable than abduction.    Minimal change in SPADI; was 14.62 and improved to 13.08     ASSESSMENT/PLAN  Updated problem list and treatment plan: Diagnosis 1:  R shoulder RCT    Pain -  home program  Decreased ROM/flexibility - home program  Impaired muscle performance - home program  Decreased function - home program  Impaired posture - home program  STG/LTGs have been met or progress has been made towards goals:  Yes (See Goal flow sheet completed today.)  Assessment of Progress: The patient's condition is improving.  The patient's condition has potential to improve.  Patient is meeting short term  goals and is progressing towards long term goals.  Self Management Plans:  Patient is independent in a home treatment program.  Patient is independent in self management of symptoms.  I have re-evaluated this patient and find that the nature, scope, duration and intensity of the therapy is appropriate for the medical condition of the patient.  Yoan continues to require the following intervention to meet STG and LTG's:  PT intervention is no longer required to meet STG/LTG.    Recommendations:  This patient is ready to be discharged from therapy and continue their home treatment program.    Please refer to the daily flowsheet for treatment today, total treatment time and time spent performing 1:1 timed codes.

## 2022-12-03 NOTE — TELEPHONE ENCOUNTER
Nutrition Assessment   Reason for Consult/Assessment: Initial, Nursing nutrition screen (MST), Other (comments), Poor po intake, RD evaluation, Weight loss on initial chart, Malnutrition (admission diagnosis)MST = 5 (>/34 pound weight loss, poor appetite); MD assessment consult and calorie count consult.     Diagnosis and Hx: Reviewed    Pertinent Nutrition History: Weakness, failure to thrive at home. Lives with elderly wife. Supportive daughters; +POA. Very hard of hearing. [facility] patient. SOB, cough, poor appetite, anorexia, persistent nausea prior to admit, not eating or drinking for several days (last had 4 ounces milk 12/2). (Per note review.)    Pertinent Nutrition Information: Somnolent in emergency department. Admitted for falilure to thrive, RANDY/CKD, moderate protein calorie malnutrition, and right kidney mass per note review.                                 Diet Order: Regular (via Room Service.)                  Diet tolerance: Not documented   Food Allergies: No    Demographic/Anthropometrics Information  Gender: male   Patient Age: 89 year old  Height:   Ht Readings from Last 1 Encounters:   12/03/22 5' 8\" (1.727 m)      Weight:   Wt Readings from Last 1 Encounters:   12/03/22 56.7 kg      BMI:   BMI Readings from Last 1 Encounters:   12/03/22 19.01 kg/m²          % Weight Change: 5.8 kg (9.3%) loss x 1 month; 9.5-9.9 kg (14.4-15%) loss x 3-4 months per chart review.  Weight change significant: Yes  Reason for weight change: Decreased intake     Estimated Needs:  Calculated Energy Needs: 9866-7003  kcal               Calculated protein needs:   g    Calculated Fluid Needs: 1ml/kcal              NFPE  Nutrition Focused Physical Exam  Physical Exam Completed: No  Reason Not Completed: Remote coverage, Other (Chart reviewed.)     Muscle Mass  Overall Muscle Mass: Severe (significant muscle wasting per H&P.)  Skin Assessment/Wounds  Edema: No sign of fluid accumulation (per H&P.)             TREATMENT  Prescription approved per [facility] Refill Protocol.  Trinidad Koehler, RN  [facility] RN Triage Team     PLAN: Monitoring & Interventions   Intervention: Coordination of nutrition care by a nutrition professional, Meals and snacks, Nutrition supplement therapy   Coordination of nutrition care: RD following. Calorie consult also obtained. Addressed same with RN through weekend to completely document meal/supplement intake in I&Os.     Meals & snacks: Regular Diet via Room Service.                                                           Nutrition supplement therapy: Ensure Plus High Protein TID with meals (350 calories, 20 grams protein per 8 ounces).       Goal: Achieve normal electrolytes, Increase oral intake to >/equal 50% of meals and supplements, Maintain or improve weight, Meet >/equal 75% estimated needs, Tolerate oral diet   Intervention goal status: Initiated  Time frame to achieve goal: Ongoing     Dietitian will monitor: Anthropometric Measurements, Biochemical data, medical tests, procedures, Food, beverage, and nutrient intake, Nutrition-focused physical findings      Nutrition Diagnosis / PES  Nutrition Diagnosis: Malnutrition  Malnutrition in the context of chronic illness: Severe  Related to: Nausea, Decreased intake   As evidenced by: Consuming <50% estimated calorie needs, Consuming <50% estimated protein needs, Diet history/recall, Loss of muscle mass, Nausea, Weight loss over time   Malnutrition diagnosis acute illness/injury; severe: Energy intake of <50% of estimated energy requirement for >/equals 5 days  Malnutrition chronic; severe: Weight loss of > 5%/1 month, Weight loss of >7.5%/3 months, Weight loss of >10%/6 months, Severe depletion of muscle mass  Primary Nutrition Diagnosis status: New nutrition diagnosis

## 2023-01-31 ENCOUNTER — TRANSFERRED RECORDS (OUTPATIENT)
Dept: HEALTH INFORMATION MANAGEMENT | Facility: CLINIC | Age: 73
End: 2023-01-31

## 2023-02-14 ENCOUNTER — TRANSFERRED RECORDS (OUTPATIENT)
Dept: HEALTH INFORMATION MANAGEMENT | Facility: CLINIC | Age: 73
End: 2023-02-14

## 2023-03-20 DIAGNOSIS — I10 ESSENTIAL HYPERTENSION WITH GOAL BLOOD PRESSURE LESS THAN 140/90: ICD-10-CM

## 2023-03-20 DIAGNOSIS — E78.5 HYPERLIPIDEMIA LDL GOAL <130: ICD-10-CM

## 2023-03-21 RX ORDER — AMLODIPINE BESYLATE 10 MG/1
TABLET ORAL
Qty: 90 TABLET | Refills: 3 | Status: SHIPPED | OUTPATIENT
Start: 2023-03-21 | End: 2024-03-15

## 2023-03-21 RX ORDER — SIMVASTATIN 40 MG
TABLET ORAL
Qty: 90 TABLET | Refills: 0 | Status: SHIPPED | OUTPATIENT
Start: 2023-03-21 | End: 2023-06-20

## 2023-04-04 ENCOUNTER — MYC MEDICAL ADVICE (OUTPATIENT)
Dept: FAMILY MEDICINE | Facility: CLINIC | Age: 73
End: 2023-04-04
Payer: COMMERCIAL

## 2023-04-14 ENCOUNTER — TRANSFERRED RECORDS (OUTPATIENT)
Dept: HEALTH INFORMATION MANAGEMENT | Facility: CLINIC | Age: 73
End: 2023-04-14
Payer: COMMERCIAL

## 2023-05-07 DIAGNOSIS — I10 ESSENTIAL HYPERTENSION WITH GOAL BLOOD PRESSURE LESS THAN 140/90: ICD-10-CM

## 2023-05-07 DIAGNOSIS — I10 BENIGN ESSENTIAL HYPERTENSION: ICD-10-CM

## 2023-05-08 RX ORDER — TRIAMTERENE AND HYDROCHLOROTHIAZIDE 37.5; 25 MG/1; MG/1
CAPSULE ORAL
Qty: 90 CAPSULE | Refills: 3 | Status: SHIPPED | OUTPATIENT
Start: 2023-05-08 | End: 2024-05-03

## 2023-05-08 RX ORDER — LOSARTAN POTASSIUM 50 MG/1
TABLET ORAL
Qty: 90 TABLET | Refills: 3 | Status: SHIPPED | OUTPATIENT
Start: 2023-05-08 | End: 2024-05-03

## 2023-06-26 ASSESSMENT — ENCOUNTER SYMPTOMS
MYALGIAS: 0
COUGH: 0
EYE PAIN: 0
HEMATURIA: 0
HEADACHES: 0
SORE THROAT: 0
NERVOUS/ANXIOUS: 0
SHORTNESS OF BREATH: 0
FREQUENCY: 0
ARTHRALGIAS: 0
FEVER: 0
PALPITATIONS: 0
PARESTHESIAS: 0
HEARTBURN: 0
WEAKNESS: 0
DIZZINESS: 0
JOINT SWELLING: 0
CONSTIPATION: 0
NAUSEA: 0
HEMATOCHEZIA: 0
ABDOMINAL PAIN: 0
CHILLS: 0
DYSURIA: 0
DIARRHEA: 0

## 2023-06-26 ASSESSMENT — ACTIVITIES OF DAILY LIVING (ADL): CURRENT_FUNCTION: NO ASSISTANCE NEEDED

## 2023-06-26 NOTE — PROGRESS NOTES
"SUBJECTIVE:   Yoan is a 72 year old who presents for Preventive Visit.       No data to display              Are you in the first 12 months of your Medicare coverage?  No    Healthy Habits:     In general, how would you rate your overall health?  Excellent    Frequency of exercise:  4-5 days/week    Duration of exercise:  Greater than 60 minutes    Do you usually eat at least 4 servings of fruit and vegetables a day, include whole grains    & fiber and avoid regularly eating high fat or \"junk\" foods?  No    Taking medications regularly:  Yes    Medication side effects:  None    Ability to successfully perform activities of daily living:  No assistance needed    Home Safety:  No safety concerns identified    Hearing Impairment:  Difficulty following a conversation in a noisy restaurant or crowded room and difficulty understanding soft or whispered speech    In the past 6 months, have you been bothered by leaking of urine?  No    In general, how would you rate your overall mental or emotional health?  Excellent      PHQ-2 Total Score: 0    Additional concerns today:  No        Have you ever done Advance Care Planning? (For example, a Health Directive, POLST, or a discussion with a medical provider or your loved ones about your wishes): Yes, patient states has an Advance Care Planning document and will bring a copy to the clinic.      Fall risk  Fallen 2 or more times in the past year?: No  Any fall with injury in the past year?: No  click delete button to remove this line now  Cognitive Screening   1) Repeat 3 items (Leader, Season, Table)    2) Clock draw: NORMAL  3) 3 item recall: Recalls 3 objects  Results: NORMAL clock, 1-2 items recalled: COGNITIVE IMPAIRMENT LESS LIKELY    Mini-CogTM Copyright SAYDA Coulter. Licensed by the author for use in [facility]; reprinted with permission (zofia@.[facility]). All rights reserved.      Do you have sleep apnea, excessive snoring or daytime drowsiness?: no    Reviewed and " updated as needed this visit by clinical staff                  Reviewed and updated as needed this visit by Provider                 Social History     Tobacco Use     Smoking status: Never     Smokeless tobacco: Never   Substance Use Topics     Alcohol use: Yes     Alcohol/week: 0.0 standard drinks of alcohol     Comment: maybe 1 glass of wine a month           6/26/2023     9:53 AM   Alcohol Use   Prescreen: >3 drinks/day or >7 drinks/week? No          No data to display              Do you have a current opioid prescription? No  Do you use any other controlled substances or medications that are not prescribed by a provider? None      Current providers sharing in care for this patient include:   Patient Care Team:  Angus Llanes MD as PCP - General (Internal Medicine)  Yoan Oates MD as MD (Orthopedics)  Angus Llanes MD as Assigned PCP    The following health maintenance items are reviewed in Epic and correct as of today:  Health Maintenance   Topic Date Due     COVID-19 Vaccine (5 - Moderna series) 05/27/2022     DTAP/TDAP/TD IMMUNIZATION (3 - Td or Tdap) 08/29/2022     BMP  01/20/2023     LIPID  01/20/2023     MEDICARE ANNUAL WELLNESS VISIT  07/15/2023     ANNUAL REVIEW OF HM ORDERS  07/15/2023     CMP  07/20/2023     MICROALBUMIN  07/20/2023     CBC  07/20/2023     HEMOGLOBIN  07/20/2023     COLORECTAL CANCER SCREENING  12/12/2023     FALL RISK ASSESSMENT  06/27/2024     ADVANCE CARE PLANNING  07/15/2027     HEPATITIS C SCREENING  Completed     PHQ-2 (once per calendar year)  Completed     INFLUENZA VACCINE  Completed     Pneumococcal Vaccine: 65+ Years  Completed     URINALYSIS  Completed     ZOSTER IMMUNIZATION  Completed     IPV IMMUNIZATION  Aged Out     MENINGITIS IMMUNIZATION  Aged Out     AORTIC ANEURYSM SCREENING (SYSTEM ASSIGNED)  Discontinued     Lab work is in process  Labs reviewed in EPIC       Stage 3 chronic kidney disease, unspecified whether stage 3a or 3b CKD  "(H)  Hyperlipidemia LDL goal <130   Continues on simvastatin - no side effects  Boogie's esophagus without dysplasia   Doing well with omeprazole   Prediabetes   Has been exercising.    Primary hypertension   Blood pressure has been well controlled usually with dyazide, losartan and amlodipine.  Blood pressure is usually in 140-150/80.    Elevated prostate specific antigen (PSA)   Is due for recheck PSA and follow up in urologyh  Osteoarthritis thumb   Has had to ice thumbs to allow to play tennis.  Has been monitoring with orthopedics.  Has no other issues  Chest pain   Yoan H Theno has had occasional chest pain - noticed resting.  May be associated with esophageal spasm.  No exertional component.        Review of Systems   Constitutional: Negative for chills and fever.   HENT: Negative for congestion, ear pain, hearing loss and sore throat.    Eyes: Negative for pain and visual disturbance.   Respiratory: Negative for cough and shortness of breath.    Cardiovascular: Negative for chest pain, palpitations and peripheral edema.   Gastrointestinal: Negative for abdominal pain, constipation, diarrhea, heartburn, hematochezia and nausea.   Genitourinary: Negative for dysuria, frequency, genital sores, hematuria, impotence, penile discharge and urgency.   Musculoskeletal: Negative for arthralgias, joint swelling and myalgias.   Skin: Negative for rash.   Neurological: Negative for dizziness, weakness, headaches and paresthesias.   Psychiatric/Behavioral: Negative for mood changes. The patient is not nervous/anxious.        OBJECTIVE:   BP (!) 149/81 (BP Location: Right arm, Patient Position: Sitting, Cuff Size: Adult Regular)   Pulse 59   Temp 98  F (36.7  C) (Tympanic)   Resp 14   Ht 1.753 m (5' 9\")   Wt 88.8 kg (195 lb 12.8 oz)   SpO2 99%   BMI 28.91 kg/m   Estimated body mass index is 28.91 kg/m  as calculated from the following:    Height as of this encounter: 1.753 m (5' 9\").    Weight as of this encounter: " 88.8 kg (195 lb 12.8 oz).  Physical Exam  GENERAL: healthy, alert and no distress  EYES: Eyes grossly normal to inspection,   HENT: ear canals and TM's normal, nose and mouth without ulcers or lesions  NECK: no adenopathy, no asymmetry, masses,   RESP: lungs clear to auscultation - no rales, rhonchi or wheezes  CV: regular rate and rhythm, normal S1 S2, no S3 or S4, no murmur, click or rub, no peripheral edema  ABDOMEN: soft, nontender, no hepatosplenomegaly, no masses and bowel sounds normal  MS: no gross musculoskeletal defects noted, no edema  SKIN: no suspicious lesions or rashes  NEURO: Normal strength and tone, mentation intact and speech normal  PSYCH: mentation appears normal, affect normal/bright    Diagnostic Test Results:  Labs reviewed in Epic    ASSESSMENT / PLAN:     Patient Instructions   (Z00.00) Routine general medical examination at a health care facility  (primary encounter diagnosis)  Comment: For routine exam, we will draw labs as ordered, cholesterol, diabetes mellitus check, liver function, renal function, PSA and refer for colonoscopy.  We will also update vaccination history.  Plan: Lipid panel reflex to direct LDL Non-fasting,         COMPREHENSIVE METABOLIC PANEL, Albumin Random         Urine Quantitative with Creat Ratio, CBC with         Platelets, Hemoglobin A1c            (N18.30) Stage 3 chronic kidney disease, unspecified whether stage 3a or 3b CKD (H)  Comment: Doing well with hydration.  We will recheck labs   Plan: COMPREHENSIVE METABOLIC PANEL, Albumin Random         Urine Quantitative with Creat Ratio, CBC with         Platelets            (E78.5) Hyperlipidemia LDL goal <130  Comment: Continue simvastatin   Plan: Lipid panel reflex to direct LDL Non-fasting            (K22.70) Boogie's esophagus without dysplasia  Comment: continue omeprazole   Plan:     (R73.03) Glucose intolerance (pre-diabetes)  Comment:   Plan: Hemoglobin A1c            (I10) Primary hypertension  Comment:  blood pressure is a bit elevated.  We will continue current blood pressure medications and follow up via [facility] to review  Plan:     (R97.20) Elevated prostate specific antigen (PSA)  Comment:   Plan: PSA, tumor marker            (I10) Essential hypertension with goal blood pressure less than 140/90  Comment: blood pressure is elevated as above   Plan:           Patient has been advised of split billing requirements and indicates understanding: Yes      COUNSELING:  Reviewed preventive health counseling, as reflected in patient instructions        He reports that he has never smoked. He has never used smokeless tobacco.      Appropriate preventive services were discussed with this patient, including applicable screening as appropriate for cardiovascular disease, diabetes, osteopenia/osteoporosis, and glaucoma.  As appropriate for age/gender, discussed screening for colorectal cancer, prostate cancer, breast cancer, and cervical cancer. Checklist reviewing preventive services available has been given to the patient.    Reviewed patients plan of care and provided an AVS. The Basic Care Plan (routine screening as documented in Health Maintenance) for Yoan meets the Care Plan requirement. This Care Plan has been established and reviewed with the Patient.      Angus Llanes MD, MD  [facility]    Identified Health Risks:    I have reviewed Opioid Use Disorder and Substance Use Disorder risk factors and made any needed referrals.

## 2023-06-27 ENCOUNTER — OFFICE VISIT (OUTPATIENT)
Dept: FAMILY MEDICINE | Facility: CLINIC | Age: 73
End: 2023-06-27
Payer: COMMERCIAL

## 2023-06-27 VITALS
HEART RATE: 59 BPM | OXYGEN SATURATION: 99 % | DIASTOLIC BLOOD PRESSURE: 81 MMHG | BODY MASS INDEX: 29 KG/M2 | SYSTOLIC BLOOD PRESSURE: 149 MMHG | HEIGHT: 69 IN | WEIGHT: 195.8 LBS | RESPIRATION RATE: 14 BRPM | TEMPERATURE: 98 F

## 2023-06-27 DIAGNOSIS — N18.30 STAGE 3 CHRONIC KIDNEY DISEASE, UNSPECIFIED WHETHER STAGE 3A OR 3B CKD (H): ICD-10-CM

## 2023-06-27 DIAGNOSIS — Z00.00 ROUTINE GENERAL MEDICAL EXAMINATION AT A HEALTH CARE FACILITY: Primary | ICD-10-CM

## 2023-06-27 DIAGNOSIS — I10 PRIMARY HYPERTENSION: ICD-10-CM

## 2023-06-27 DIAGNOSIS — I10 ESSENTIAL HYPERTENSION WITH GOAL BLOOD PRESSURE LESS THAN 140/90: ICD-10-CM

## 2023-06-27 DIAGNOSIS — R97.20 ELEVATED PROSTATE SPECIFIC ANTIGEN (PSA): ICD-10-CM

## 2023-06-27 DIAGNOSIS — Z23 HIGH PRIORITY FOR 2019-NCOV VACCINE: ICD-10-CM

## 2023-06-27 DIAGNOSIS — E78.5 HYPERLIPIDEMIA LDL GOAL <130: ICD-10-CM

## 2023-06-27 DIAGNOSIS — R73.03 PREDIABETES: ICD-10-CM

## 2023-06-27 DIAGNOSIS — K22.70 BARRETT'S ESOPHAGUS WITHOUT DYSPLASIA: ICD-10-CM

## 2023-06-27 DIAGNOSIS — R07.89 OTHER CHEST PAIN: ICD-10-CM

## 2023-06-27 DIAGNOSIS — Z12.5 ENCOUNTER FOR SCREENING FOR MALIGNANT NEOPLASM OF PROSTATE: ICD-10-CM

## 2023-06-27 LAB
ALBUMIN SERPL BCG-MCNC: 4.8 G/DL (ref 3.5–5.2)
ALP SERPL-CCNC: 77 U/L (ref 40–129)
ALT SERPL W P-5'-P-CCNC: 17 U/L (ref 0–70)
ANION GAP SERPL CALCULATED.3IONS-SCNC: 13 MMOL/L (ref 7–15)
AST SERPL W P-5'-P-CCNC: 28 U/L (ref 0–45)
BILIRUB SERPL-MCNC: 0.5 MG/DL
BUN SERPL-MCNC: 17.4 MG/DL (ref 8–23)
CALCIUM SERPL-MCNC: 9.8 MG/DL (ref 8.8–10.2)
CHLORIDE SERPL-SCNC: 105 MMOL/L (ref 98–107)
CHOLEST SERPL-MCNC: 155 MG/DL
CREAT SERPL-MCNC: 1.18 MG/DL (ref 0.67–1.17)
CREAT UR-MCNC: 368 MG/DL
DEPRECATED HCO3 PLAS-SCNC: 26 MMOL/L (ref 22–29)
ERYTHROCYTE [DISTWIDTH] IN BLOOD BY AUTOMATED COUNT: 13.3 % (ref 10–15)
GFR SERPL CREATININE-BSD FRML MDRD: 66 ML/MIN/1.73M2
GLUCOSE SERPL-MCNC: 111 MG/DL (ref 70–99)
HBA1C MFR BLD: 6 % (ref 0–5.6)
HCT VFR BLD AUTO: 47.3 % (ref 40–53)
HDLC SERPL-MCNC: 37 MG/DL
HGB BLD-MCNC: 15.9 G/DL (ref 13.3–17.7)
LDLC SERPL CALC-MCNC: 95 MG/DL
MCH RBC QN AUTO: 29 PG (ref 26.5–33)
MCHC RBC AUTO-ENTMCNC: 33.6 G/DL (ref 31.5–36.5)
MCV RBC AUTO: 86 FL (ref 78–100)
MICROALBUMIN UR-MCNC: 12.2 MG/L
MICROALBUMIN/CREAT UR: 3.32 MG/G CR (ref 0–17)
NONHDLC SERPL-MCNC: 118 MG/DL
PLATELET # BLD AUTO: 222 10E3/UL (ref 150–450)
POTASSIUM SERPL-SCNC: 4.1 MMOL/L (ref 3.4–5.3)
PROT SERPL-MCNC: 7.7 G/DL (ref 6.4–8.3)
PSA SERPL DL<=0.01 NG/ML-MCNC: 3.3 NG/ML (ref 0–6.5)
PSA SERPL DL<=0.01 NG/ML-MCNC: 3.46 NG/ML (ref 0–6.5)
RBC # BLD AUTO: 5.48 10E6/UL (ref 4.4–5.9)
SODIUM SERPL-SCNC: 144 MMOL/L (ref 136–145)
TRIGL SERPL-MCNC: 114 MG/DL
WBC # BLD AUTO: 6.3 10E3/UL (ref 4–11)

## 2023-06-27 PROCEDURE — 99214 OFFICE O/P EST MOD 30 MIN: CPT | Mod: 25 | Performed by: INTERNAL MEDICINE

## 2023-06-27 PROCEDURE — 36415 COLL VENOUS BLD VENIPUNCTURE: CPT | Performed by: INTERNAL MEDICINE

## 2023-06-27 PROCEDURE — 85027 COMPLETE CBC AUTOMATED: CPT | Performed by: INTERNAL MEDICINE

## 2023-06-27 PROCEDURE — 82570 ASSAY OF URINE CREATININE: CPT | Performed by: INTERNAL MEDICINE

## 2023-06-27 PROCEDURE — 80053 COMPREHEN METABOLIC PANEL: CPT | Performed by: INTERNAL MEDICINE

## 2023-06-27 PROCEDURE — G0439 PPPS, SUBSEQ VISIT: HCPCS | Performed by: INTERNAL MEDICINE

## 2023-06-27 PROCEDURE — 83036 HEMOGLOBIN GLYCOSYLATED A1C: CPT | Performed by: INTERNAL MEDICINE

## 2023-06-27 PROCEDURE — G0103 PSA SCREENING: HCPCS | Performed by: INTERNAL MEDICINE

## 2023-06-27 PROCEDURE — 91313 COVID-19 BIVALENT 18+ (MODERNA): CPT | Performed by: INTERNAL MEDICINE

## 2023-06-27 PROCEDURE — 2894A PSA TUMOR MARKER: CPT | Performed by: INTERNAL MEDICINE

## 2023-06-27 PROCEDURE — 0134A COVID-19 BIVALENT 18+ (MODERNA): CPT | Performed by: INTERNAL MEDICINE

## 2023-06-27 PROCEDURE — 82043 UR ALBUMIN QUANTITATIVE: CPT | Performed by: INTERNAL MEDICINE

## 2023-06-27 PROCEDURE — 80061 LIPID PANEL: CPT | Performed by: INTERNAL MEDICINE

## 2023-06-27 ASSESSMENT — PAIN SCALES - GENERAL: PAINLEVEL: NO PAIN (0)

## 2023-06-27 NOTE — PATIENT INSTRUCTIONS
(Z00.00) Routine general medical examination at a health care facility  (primary encounter diagnosis)  Comment: For routine exam, we will draw labs as ordered, cholesterol, diabetes mellitus check, liver function, renal function, PSA and refer for colonoscopy.  We will also update vaccination history.  Plan: Lipid panel reflex to direct LDL Non-fasting,         COMPREHENSIVE METABOLIC PANEL, Albumin Random         Urine Quantitative with Creat Ratio, CBC with         Platelets, Hemoglobin A1c            (N18.30) Stage 3 chronic kidney disease, unspecified whether stage 3a or 3b CKD (H)  Comment: Doing well with hydration.  We will recheck labs   Plan: COMPREHENSIVE METABOLIC PANEL, Albumin Random         Urine Quantitative with Creat Ratio, CBC with         Platelets            (E78.5) Hyperlipidemia LDL goal <130  Comment: Continue simvastatin   Plan: Lipid panel reflex to direct LDL Non-fasting            (K22.70) Boogie's esophagus without dysplasia  Comment: continue omeprazole   Plan:     (R73.03) Glucose intolerance (pre-diabetes)  Comment:   Plan: Hemoglobin A1c            (I10) Primary hypertension  Comment: blood pressure is a bit elevated.  We will continue current blood pressure medications and follow up via [facility] to review  Plan:     (R97.20) Elevated prostate specific antigen (PSA)  Comment:   Plan: PSA, tumor marker            (I10) Essential hypertension with goal blood pressure less than 140/90  Comment: blood pressure is elevated as above   Plan:      Chest pain  Comment; Recommend exercise stress echocardiogram [facility] - [phone]

## 2023-06-29 NOTE — RESULT ENCOUNTER NOTE
Conor Milton,    I had the opportunity to review your recent labs and a summary of your labs reads as follows:    Your complete blood counts show no sign of anemia, normal white blood cell count and platelets.  Your comprehensive metabolic panel showed stable renal function, normal liver function,   Your hemoglobin A1c is slightly higher than it was last check, but stable.  Continue healthy diet   Your fasting lipid panel show  - stable HDL (good) cholesterol -as your goal is greater than 40  - low LDL (bad) cholesterol as your goal is less than 100  - normal triglyceride levels  Your PSA level is also stable iand follow up in urology would be recommended        Sincerely,  Angus Llanes MD

## 2023-07-20 ENCOUNTER — HOSPITAL ENCOUNTER (OUTPATIENT)
Dept: CARDIOLOGY | Facility: CLINIC | Age: 73
Discharge: HOME | End: 2023-07-20
Attending: INTERNAL MEDICINE | Admitting: INTERNAL MEDICINE
Payer: COMMERCIAL

## 2023-07-20 DIAGNOSIS — R07.89 OTHER CHEST PAIN: ICD-10-CM

## 2023-07-20 PROCEDURE — 255N000002 HC RX 255 OP 636: Performed by: INTERNAL MEDICINE

## 2023-07-20 PROCEDURE — 93018 CV STRESS TEST I&R ONLY: CPT | Performed by: INTERNAL MEDICINE

## 2023-07-20 PROCEDURE — 93016 CV STRESS TEST SUPVJ ONLY: CPT | Performed by: INTERNAL MEDICINE

## 2023-07-20 PROCEDURE — 93017 CV STRESS TEST TRACING ONLY: CPT

## 2023-07-20 PROCEDURE — 999N000208 ECHO STRESS ECHOCARDIOGRAM

## 2023-07-20 PROCEDURE — 93325 DOPPLER ECHO COLOR FLOW MAPG: CPT | Mod: 26 | Performed by: INTERNAL MEDICINE

## 2023-07-20 PROCEDURE — 93350 STRESS TTE ONLY: CPT | Mod: 26 | Performed by: INTERNAL MEDICINE

## 2023-07-20 PROCEDURE — 93321 DOPPLER ECHO F-UP/LMTD STD: CPT | Mod: 26 | Performed by: INTERNAL MEDICINE

## 2023-07-20 RX ADMIN — HUMAN ALBUMIN MICROSPHERES AND PERFLUTREN 9 ML: 10; .22 INJECTION, SOLUTION INTRAVENOUS at 14:12

## 2023-07-22 NOTE — RESULT ENCOUNTER NOTE
Conor Milton,    I have had the opportunity to review your recent results and an interpretation is as follows:  Congratulations on your fantastic results  Your exercise stress echocardiogram showed excellent heart rate response and no evidence of any ischemic changes on echocardiogram.    Sincerely,  Angus Llanes MD

## 2023-08-29 ENCOUNTER — TRANSFERRED RECORDS (OUTPATIENT)
Dept: HEALTH INFORMATION MANAGEMENT | Facility: CLINIC | Age: 73
End: 2023-08-29
Payer: COMMERCIAL

## 2023-09-18 ENCOUNTER — TRANSFERRED RECORDS (OUTPATIENT)
Dept: HEALTH INFORMATION MANAGEMENT | Facility: CLINIC | Age: 73
End: 2023-09-18
Payer: COMMERCIAL

## 2023-10-26 ENCOUNTER — TRANSFERRED RECORDS (OUTPATIENT)
Dept: HEALTH INFORMATION MANAGEMENT | Facility: CLINIC | Age: 73
End: 2023-10-26
Payer: COMMERCIAL

## 2023-12-13 ENCOUNTER — TRANSFERRED RECORDS (OUTPATIENT)
Dept: HEALTH INFORMATION MANAGEMENT | Facility: CLINIC | Age: 73
End: 2023-12-13
Payer: COMMERCIAL

## 2024-01-22 ENCOUNTER — TRANSFERRED RECORDS (OUTPATIENT)
Dept: HEALTH INFORMATION MANAGEMENT | Facility: CLINIC | Age: 74
End: 2024-01-22
Payer: COMMERCIAL

## 2024-02-08 ENCOUNTER — TRANSFERRED RECORDS (OUTPATIENT)
Dept: HEALTH INFORMATION MANAGEMENT | Facility: CLINIC | Age: 74
End: 2024-02-08
Payer: COMMERCIAL

## 2024-02-12 ENCOUNTER — TRANSFERRED RECORDS (OUTPATIENT)
Dept: HEALTH INFORMATION MANAGEMENT | Facility: CLINIC | Age: 74
End: 2024-02-12
Payer: COMMERCIAL

## 2024-03-14 DIAGNOSIS — I10 ESSENTIAL HYPERTENSION WITH GOAL BLOOD PRESSURE LESS THAN 140/90: ICD-10-CM

## 2024-03-15 RX ORDER — AMLODIPINE BESYLATE 10 MG/1
TABLET ORAL
Qty: 90 TABLET | Refills: 3 | Status: SHIPPED | OUTPATIENT
Start: 2024-03-15

## 2024-04-03 ENCOUNTER — TELEPHONE (OUTPATIENT)
Dept: FAMILY MEDICINE | Facility: CLINIC | Age: 74
End: 2024-04-03
Payer: COMMERCIAL

## 2024-04-03 NOTE — TELEPHONE ENCOUNTER
Reason for Call:  Appointment Request - Pt was contacted by [facility] to R/S his annual Px which was 7/1/24.     Patient requesting this type of appt:  Preventive     Requested provider: Angus Llanes    Reason patient unable to be scheduled:   Needs to be scheduled by clinic    When does patient want to be seen/preferred time:   Within a couple weeks of initial July 1st appt.     Comments: Patient would like to keep appointment fairly consistent to the same time of year.  Normal time frame is summer for his Px's.      Could we send this information to you in [employer] or would you prefer to receive a phone call?:   Patient would prefer a phone call     Okay to leave a detailed message?: Yes at Cell number on file:    Telephone Information:   Mobile [phone]     Call taken on 4/3/2024 at 1:50 PM by Kym Arreguin

## 2024-04-05 NOTE — TELEPHONE ENCOUNTER
PCP- please see mychart    Regarding patient's cough - does patient need further follow up?     Please respond back to patient or send to triage to follow up. If patient needs to be scheduled, please route to team coordinators.    Arleth Harden RN  [facility]        
Joshua Chiu(Attending)

## 2024-05-02 DIAGNOSIS — I10 BENIGN ESSENTIAL HYPERTENSION: ICD-10-CM

## 2024-05-02 DIAGNOSIS — I10 ESSENTIAL HYPERTENSION WITH GOAL BLOOD PRESSURE LESS THAN 140/90: ICD-10-CM

## 2024-05-03 RX ORDER — LOSARTAN POTASSIUM 50 MG/1
TABLET ORAL
Qty: 90 TABLET | Refills: 3 | Status: SHIPPED | OUTPATIENT
Start: 2024-05-03 | End: 2024-07-16

## 2024-05-03 RX ORDER — TRIAMTERENE AND HYDROCHLOROTHIAZIDE 37.5; 25 MG/1; MG/1
CAPSULE ORAL
Qty: 90 CAPSULE | Refills: 3 | Status: SHIPPED | OUTPATIENT
Start: 2024-05-03

## 2024-06-10 ENCOUNTER — TRANSFERRED RECORDS (OUTPATIENT)
Dept: HEALTH INFORMATION MANAGEMENT | Facility: CLINIC | Age: 74
End: 2024-06-10
Payer: COMMERCIAL

## 2024-06-23 DIAGNOSIS — E78.5 HYPERLIPIDEMIA LDL GOAL <130: ICD-10-CM

## 2024-06-24 RX ORDER — SIMVASTATIN 40 MG
TABLET ORAL
Qty: 90 TABLET | Refills: 0 | Status: SHIPPED | OUTPATIENT
Start: 2024-06-24 | End: 2024-09-25

## 2024-07-02 ENCOUNTER — MYC MEDICAL ADVICE (OUTPATIENT)
Dept: FAMILY MEDICINE | Facility: CLINIC | Age: 74
End: 2024-07-02

## 2024-07-02 ENCOUNTER — OFFICE VISIT (OUTPATIENT)
Dept: FAMILY MEDICINE | Facility: CLINIC | Age: 74
End: 2024-07-02
Payer: COMMERCIAL

## 2024-07-02 VITALS
WEIGHT: 193.9 LBS | OXYGEN SATURATION: 100 % | RESPIRATION RATE: 15 BRPM | HEIGHT: 67 IN | DIASTOLIC BLOOD PRESSURE: 80 MMHG | SYSTOLIC BLOOD PRESSURE: 156 MMHG | TEMPERATURE: 97.7 F | BODY MASS INDEX: 30.43 KG/M2 | HEART RATE: 60 BPM

## 2024-07-02 DIAGNOSIS — Z23 NEED FOR TDAP VACCINATION: ICD-10-CM

## 2024-07-02 DIAGNOSIS — Z12.5 ENCOUNTER FOR SCREENING FOR MALIGNANT NEOPLASM OF PROSTATE: ICD-10-CM

## 2024-07-02 DIAGNOSIS — K22.70 BARRETT'S ESOPHAGUS WITHOUT DYSPLASIA: ICD-10-CM

## 2024-07-02 DIAGNOSIS — N52.9 ERECTILE DYSFUNCTION, UNSPECIFIED ERECTILE DYSFUNCTION TYPE: ICD-10-CM

## 2024-07-02 DIAGNOSIS — I10 BENIGN ESSENTIAL HYPERTENSION: ICD-10-CM

## 2024-07-02 DIAGNOSIS — N18.31 STAGE 3A CHRONIC KIDNEY DISEASE (H): ICD-10-CM

## 2024-07-02 DIAGNOSIS — Z00.00 ROUTINE GENERAL MEDICAL EXAMINATION AT A HEALTH CARE FACILITY: Primary | ICD-10-CM

## 2024-07-02 DIAGNOSIS — R73.03 PREDIABETES: ICD-10-CM

## 2024-07-02 DIAGNOSIS — E78.5 HYPERLIPIDEMIA LDL GOAL <130: ICD-10-CM

## 2024-07-02 DIAGNOSIS — R07.9 CHEST PAIN, UNSPECIFIED TYPE: ICD-10-CM

## 2024-07-02 DIAGNOSIS — Z29.11 NEED FOR VACCINATION AGAINST RESPIRATORY SYNCYTIAL VIRUS: ICD-10-CM

## 2024-07-02 LAB
ALBUMIN SERPL BCG-MCNC: 4.3 G/DL (ref 3.5–5.2)
ALP SERPL-CCNC: 88 U/L (ref 40–150)
ALT SERPL W P-5'-P-CCNC: 19 U/L (ref 0–70)
ANION GAP SERPL CALCULATED.3IONS-SCNC: 11 MMOL/L (ref 7–15)
AST SERPL W P-5'-P-CCNC: 28 U/L (ref 0–45)
BILIRUB SERPL-MCNC: 0.3 MG/DL
BUN SERPL-MCNC: 17.6 MG/DL (ref 8–23)
CALCIUM SERPL-MCNC: 9.7 MG/DL (ref 8.8–10.2)
CHLORIDE SERPL-SCNC: 104 MMOL/L (ref 98–107)
CHOLEST SERPL-MCNC: 122 MG/DL
CREAT SERPL-MCNC: 1.26 MG/DL (ref 0.67–1.17)
CREAT UR-MCNC: 184 MG/DL
DEPRECATED HCO3 PLAS-SCNC: 27 MMOL/L (ref 22–29)
EGFRCR SERPLBLD CKD-EPI 2021: 61 ML/MIN/1.73M2
ERYTHROCYTE [DISTWIDTH] IN BLOOD BY AUTOMATED COUNT: 12.9 % (ref 10–15)
FASTING STATUS PATIENT QL REPORTED: NO
FASTING STATUS PATIENT QL REPORTED: NO
GLUCOSE SERPL-MCNC: 84 MG/DL (ref 70–99)
HBA1C MFR BLD: 6.2 % (ref 0–5.6)
HCT VFR BLD AUTO: 42.3 % (ref 40–53)
HDLC SERPL-MCNC: 35 MG/DL
HGB BLD-MCNC: 14.3 G/DL (ref 13.3–17.7)
LDLC SERPL CALC-MCNC: 65 MG/DL
MCH RBC QN AUTO: 29 PG (ref 26.5–33)
MCHC RBC AUTO-ENTMCNC: 33.8 G/DL (ref 31.5–36.5)
MCV RBC AUTO: 86 FL (ref 78–100)
MICROALBUMIN UR-MCNC: <12 MG/L
MICROALBUMIN/CREAT UR: NORMAL MG/G{CREAT}
NONHDLC SERPL-MCNC: 87 MG/DL
PLATELET # BLD AUTO: 302 10E3/UL (ref 150–450)
POTASSIUM SERPL-SCNC: 4 MMOL/L (ref 3.4–5.3)
PROT SERPL-MCNC: 7.4 G/DL (ref 6.4–8.3)
PSA SERPL DL<=0.01 NG/ML-MCNC: 6.94 NG/ML (ref 0–6.5)
RBC # BLD AUTO: 4.93 10E6/UL (ref 4.4–5.9)
SODIUM SERPL-SCNC: 142 MMOL/L (ref 135–145)
TRIGL SERPL-MCNC: 111 MG/DL
WBC # BLD AUTO: 8.5 10E3/UL (ref 4–11)

## 2024-07-02 PROCEDURE — G0103 PSA SCREENING: HCPCS | Performed by: INTERNAL MEDICINE

## 2024-07-02 PROCEDURE — 91320 SARSCV2 VAC 30MCG TRS-SUC IM: CPT | Performed by: INTERNAL MEDICINE

## 2024-07-02 PROCEDURE — 80061 LIPID PANEL: CPT | Performed by: INTERNAL MEDICINE

## 2024-07-02 PROCEDURE — 99214 OFFICE O/P EST MOD 30 MIN: CPT | Mod: 25 | Performed by: INTERNAL MEDICINE

## 2024-07-02 PROCEDURE — 80053 COMPREHEN METABOLIC PANEL: CPT | Performed by: INTERNAL MEDICINE

## 2024-07-02 PROCEDURE — 90480 ADMN SARSCOV2 VAC 1/ONLY CMP: CPT | Performed by: INTERNAL MEDICINE

## 2024-07-02 PROCEDURE — G0439 PPPS, SUBSEQ VISIT: HCPCS | Performed by: INTERNAL MEDICINE

## 2024-07-02 PROCEDURE — 82570 ASSAY OF URINE CREATININE: CPT | Performed by: INTERNAL MEDICINE

## 2024-07-02 PROCEDURE — 93000 ELECTROCARDIOGRAM COMPLETE: CPT | Performed by: INTERNAL MEDICINE

## 2024-07-02 PROCEDURE — 36415 COLL VENOUS BLD VENIPUNCTURE: CPT | Performed by: INTERNAL MEDICINE

## 2024-07-02 PROCEDURE — 85027 COMPLETE CBC AUTOMATED: CPT | Performed by: INTERNAL MEDICINE

## 2024-07-02 PROCEDURE — 82043 UR ALBUMIN QUANTITATIVE: CPT | Performed by: INTERNAL MEDICINE

## 2024-07-02 PROCEDURE — 83036 HEMOGLOBIN GLYCOSYLATED A1C: CPT | Performed by: INTERNAL MEDICINE

## 2024-07-02 RX ORDER — SILDENAFIL 100 MG/1
TABLET, FILM COATED ORAL
Qty: 30 TABLET | Refills: 11 | Status: SHIPPED | OUTPATIENT
Start: 2024-07-02

## 2024-07-02 RX ORDER — RESPIRATORY SYNCYTIAL VIRUS VACCINE 120MCG/0.5
0.5 KIT INTRAMUSCULAR ONCE
Qty: 1 EACH | Refills: 0 | Status: CANCELLED | OUTPATIENT
Start: 2024-07-02 | End: 2024-07-02

## 2024-07-02 SDOH — HEALTH STABILITY: PHYSICAL HEALTH: ON AVERAGE, HOW MANY MINUTES DO YOU ENGAGE IN EXERCISE AT THIS LEVEL?: 120 MIN

## 2024-07-02 SDOH — HEALTH STABILITY: PHYSICAL HEALTH: ON AVERAGE, HOW MANY DAYS PER WEEK DO YOU ENGAGE IN MODERATE TO STRENUOUS EXERCISE (LIKE A BRISK WALK)?: 5 DAYS

## 2024-07-02 ASSESSMENT — SOCIAL DETERMINANTS OF HEALTH (SDOH): HOW OFTEN DO YOU GET TOGETHER WITH FRIENDS OR RELATIVES?: TWICE A WEEK

## 2024-07-02 ASSESSMENT — PAIN SCALES - GENERAL: PAINLEVEL: NO PAIN (0)

## 2024-07-02 NOTE — PROGRESS NOTES
Preventive Care Visit  [facility] [place]  Angus Llanes MD, MD, Internal Medicine  Jul 2, 2024        Santana Milton is a 73 year old, presenting for the following:  Physical          Health Care Directive  Patient does not have a Health Care Directive or Living Will: Patient states has Advance Directive and will bring in a copy to clinic.    HPI        7/2/2024   General Health   How would you rate your overall physical health? Excellent   Feel stress (tense, anxious, or unable to sleep) Not at all            7/2/2024   Nutrition   Diet: Regular (no restrictions)            7/2/2024   Exercise   Days per week of moderate/strenous exercise 5 days   Average minutes spent exercising at this level 120 min            7/2/2024   Social Factors   Frequency of gathering with friends or relatives Twice a week   Worry food won't last until get money to buy more No   Food not last or not have enough money for food? No   Do you have housing? (Housing is defined as stable permanent housing and does not include staying ouside in a car, in a tent, in an abandoned building, in an overnight shelter, or couch-surfing.) Yes   Are you worried about losing your housing? No   Lack of transportation? No   Unable to get utilities (heat,electricity)? No            7/2/2024   Fall Risk   Fallen 2 or more times in the past year? No    No   Trouble with walking or balance? No    No       Multiple values from one day are sorted in reverse-chronological order          7/2/2024   Activities of Daily Living- Home Safety   Needs help with the following daily activites None of the above   Safety concerns in the home None of the above            7/2/2024   Dental   Dentist two times every year? Yes            7/2/2024   Hearing Screening   Hearing concerns? None of the above            7/2/2024   Driving Risk Screening   Patient/family members have concerns about driving No            7/2/2024   General Alertness/Fatigue  Screening   Have you been more tired than usual lately? No            7/2/2024   Urinary Incontinence Screening   Bothered by leaking urine in past 6 months No            7/2/2024   TB Screening   Were you born outside of the [place]? No            Today's PHQ-2 Score:       7/2/2024    10:07 AM   PHQ-2 ( 1999 Pfizer)   Q1: Little interest or pleasure in doing things 0   Q2: Feeling down, depressed or hopeless 0   PHQ-2 Score 0   Q1: Little interest or pleasure in doing things Not at all   Q2: Feeling down, depressed or hopeless Not at all   PHQ-2 Score 0           7/2/2024   Substance Use   Alcohol more than 3/day or more than 7/wk No   Do you have a current opioid prescription? No   How severe/bad is pain from 1 to 10? 1/10   Do you use any other substances recreationally? No    (!) PRESCRIPTION DRUGS       Multiple values from one day are sorted in reverse-chronological order     Social History     Tobacco Use    Smoking status: Never    Smokeless tobacco: Never   Vaping Use    Vaping status: Never Used   Substance Use Topics    Alcohol use: Yes     Alcohol/week: 0.0 standard drinks of alcohol     Comment: maybe 1 glass of wine a month    Drug use: No           7/2/2024   AAA Screening   Family history of Abdominal Aortic Aneurysm (AAA)? No      ASCVD Risk   The 10-year ASCVD risk score (Rina RIVERA, et al., 2019) is: 33.2%    Values used to calculate the score:      Age: 73 years      Sex: Male      Is Non- : No      Diabetic: No      Tobacco smoker: No      Systolic Blood Pressure: 159 mmHg      Is BP treated: Yes      HDL Cholesterol: 37 mg/dL      Total Cholesterol: 155 mg/dL          Reviewed and updated as needed this visit by Provider                    Past Medical History:   Diagnosis Date    Glucose intolerance (pre-diabetes)     HTN (hypertension)     Hyperlipidaemia     Overweight (BMI 25.0-29.9)     Seasonal allergies     s/p allergy shots     Current providers sharing in  care for this patient include:  Patient Care Team:  Angus Llanes MD as PCP - General (Internal Medicine)  Yoan Oates MD as MD (Orthopedics)  Angus Llanes MD as Assigned PCP    The following health maintenance items are reviewed in Epic and correct as of today:  Health Maintenance   Topic Date Due    RSV VACCINE (Pregnancy & 60+) (1 - 1-dose 60+ series) Never done    DTAP/TDAP/TD IMMUNIZATION (3 - Td or Tdap) 08/29/2022    BMP  12/27/2023    LIPID  12/27/2023    COVID-19 Vaccine (7 - 2023-24 season) 03/29/2024    CMP  06/27/2024    MICROALBUMIN  06/27/2024    ANNUAL REVIEW OF HM ORDERS  06/27/2024    CBC  06/27/2024    MEDICARE ANNUAL WELLNESS VISIT  06/27/2024    HEMOGLOBIN  06/27/2024    INFLUENZA VACCINE (1) 09/01/2024    FALL RISK ASSESSMENT  07/02/2025    GLUCOSE  06/27/2026    ADVANCE CARE PLANNING  06/27/2028    COLORECTAL CANCER SCREENING  12/13/2028    HEPATITIS C SCREENING  Completed    PHQ-2 (once per calendar year)  Completed    Pneumococcal Vaccine: 65+ Years  Completed    URINALYSIS  Completed    ZOSTER IMMUNIZATION  Completed    IPV IMMUNIZATION  Aged Out    HPV IMMUNIZATION  Aged Out    MENINGITIS IMMUNIZATION  Aged Out    RSV MONOCLONAL ANTIBODY  Aged Out        Stage 3a chronic kidney disease (H)   He has been drinking plenty of fluids and avoiding NSAIDs  Hyperlipidemia LDL goal <130   Continues on   Chest congestion   Yoan H Theno has been having some upper chest tighntess and congestion that comes and goes and may be related to eating.  Also, may be triggered by playing tennis.  Had stress echocardiogram last year for similar symptoms showing no concerning findings.  He is taking omeprazole for Boogie's esophagus and is due for recheck upper endoscopy in October  Erectile dysfunction   He has been taking sildenafil from an outside source.  He wishes to get a prescription from our office today.    Review of Systems  Constitutional, HEENT, cardiovascular, pulmonary,  "GI, , musculoskeletal, neuro, skin, endocrine and psych systems are negative, except as otherwise noted.     Objective    Exam  BP (!) 156/80 (BP Location: Right arm, Patient Position: Sitting, Cuff Size: Adult Regular)   Pulse 60   Temp 97.7  F (36.5  C) (Temporal)   Resp 15   Ht 1.702 m (5' 7\")   Wt 88 kg (193 lb 14.4 oz)   SpO2 100%   BMI 30.37 kg/m     Estimated body mass index is 30.37 kg/m  as calculated from the following:    Height as of this encounter: 1.702 m (5' 7\").    Weight as of this encounter: 88 kg (193 lb 14.4 oz).    Physical Exam  GENERAL: alert and no distress  EYES: Eyes grossly normal to inspection   HENT: ear canals and TM's normal, nose and mouth without ulcers or lesions  NECK: no adenopathy, no asymmetry, masses, or scars  RESP: lungs clear to auscultation - no rales, rhonchi or wheezes  CV: regular rate and rhythm, normal S1 S2, no S3 or S4, no murmur,    ABDOMEN: soft, nontender, no hepatosplenomegaly, no masses and bowel sounds normal  MS: no gross musculoskeletal defects noted, no edema  SKIN: no suspicious lesions or rashes  NEURO: Normal strength and tone, mentation intact and speech normal  PSYCH: mentation appears normal, affect normal/bright    EKG - Normal sinus rhythm  - no dynamic changes concerning for ischemia          7/2/2024   Mini Cog   Clock Draw Score 2 Normal   3 Item Recall 3 objects recalled   Mini Cog Total Score 5               Patient Instructions   (Z00.00) Routine general medical examination at a health care facility  (primary encounter diagnosis)  Comment: For routine exam, we will draw labs as ordered, cholesterol, diabetes mellitus check, liver function, renal function, PSA and refer for colonoscopy.  We will also update vaccination history. RSV and TDAP recommended   Plan:     (Z23) Need for Tdap vaccination  Comment: TDAP   Plan:     (Z29.11) Need for vaccination against respiratory syncytial virus  Comment: RSV recommended   Plan:     (N18.31) " Stage 3a chronic kidney disease (H)  Comment: Check labs today and continue to say hydrated   Plan: COMPREHENSIVE METABOLIC PANEL, Albumin Random         Urine Quantitative with Creat Ratio, CBC with         Platelets            (E78.5) Hyperlipidemia LDL goal <130  Comment: check fasting lipid panel and we will consider adjusing to more potent statin - Atorvastatin depending on results  Plan: Lipid panel reflex to direct LDL Non-fasting            (R07.9) Chest pain, unspecified type  Comment: Check EKG today   Plan: EKG 12-lead complete w/read - Clinics            (K22.70) Boogie's esophagus without dysplasia  Comment: cotinue omeprazole daily and follow up upper endoscopy in November   Plan:     (R73.03) Glucose intolerance (pre-diabetes)  Comment: check hemoglobin A1c today   Plan: Hemoglobin A1c                Signed Electronically by: Angus Llanes MD, MD

## 2024-07-02 NOTE — PATIENT INSTRUCTIONS
(Z00.00) Routine general medical examination at a health care facility  (primary encounter diagnosis)  Comment: For routine exam, we will draw labs as ordered, cholesterol, diabetes mellitus check, liver function, renal function, PSA and refer for colonoscopy.  We will also update vaccination history. RSV and TDAP recommended   Plan:     (Z23) Need for Tdap vaccination  Comment: TDAP   Plan:     (Z29.11) Need for vaccination against respiratory syncytial virus  Comment: RSV recommended   Plan:     (N18.31) Stage 3a chronic kidney disease (H)  Comment: Check labs today and continue to say hydrated   Plan: COMPREHENSIVE METABOLIC PANEL, Albumin Random         Urine Quantitative with Creat Ratio, CBC with         Platelets            (E78.5) Hyperlipidemia LDL goal <130  Comment: check fasting lipid panel and we will consider adjusing to more potent statin - Atorvastatin depending on results  Plan: Lipid panel reflex to direct LDL Non-fasting            (R07.9) Chest pain, unspecified type  Comment: Check EKG today   Plan: EKG 12-lead complete w/read - Clinics            (K22.70) Boogie's esophagus without dysplasia  Comment: cotinue omeprazole daily and follow up upper endoscopy in November   Plan:     (R73.03) Glucose intolerance (pre-diabetes)  Comment: check hemoglobin A1c today   Plan: Hemoglobin A1c

## 2024-07-03 ENCOUNTER — TELEPHONE (OUTPATIENT)
Dept: FAMILY MEDICINE | Facility: CLINIC | Age: 74
End: 2024-07-03
Payer: COMMERCIAL

## 2024-07-03 DIAGNOSIS — R97.20 ELEVATED PROSTATE SPECIFIC ANTIGEN (PSA): Primary | ICD-10-CM

## 2024-07-03 NOTE — RESULT ENCOUNTER NOTE
Conor Milton,    I had the opportunity to review your recent labs and a summary of your labs reads as follows:    Your complete blood counts show no sign of anemia, normal white blood cell count and platelets.  Your comprehensive metabolic panel showed stable renal function, normal liver function,  Your A1c is a bit elevated, but still in the prediabetic range.  Continue to work on diet and exercise  Your fasting lipid panel show  - normal HDL (good) cholesterol -as your goal is greater than 40  - low LDL (bad) cholesterol as your goal is less than 130  - normal triglyceride levels    Your PSA has doubled as compared to last year, and I would recommend we refer you to urology to further investigate this    [facility] [address]  [address]  [facility] [address]  Phone: [phone]  Fax: [phone]          Sincerely,  Angus Llanes MD

## 2024-07-03 NOTE — TELEPHONE ENCOUNTER
Patient Contact    Attempt # 1    Was call answered?  No.  Left message on answering machine for patient to call back.     Per epic, patient has viewed lab results and result notes via .        Martina DEJESUS RN,BSN    July 3, 2024  11:32 AM     Patient here for evaluation of a productive cough with yellow mucus, nasal congestion and drainage, and bilateral ear pain/pressure x 3 days. States she was in [place] when symptoms started. She has received COVID vaccines, last dose of Pfizer was 5/1/21.

## 2024-07-03 NOTE — TELEPHONE ENCOUNTER
Can we call Yoan Baltazar and let him know that     Hi Yoan,    I had the opportunity to review your recent labs and a summary of your labs reads as follows:    Your complete blood counts show no sign of anemia, normal white blood cell count and platelets.  Your comprehensive metabolic panel showed stable renal function, normal liver function,  Your A1c is a bit elevated, but still in the prediabetic range.  Continue to work on diet and exercise  Your fasting lipid panel show  - normal HDL (good) cholesterol -as your goal is greater than 40  - low LDL (bad) cholesterol as your goal is less than 130  - normal triglyceride levels    Your PSA has doubled as compared to last year, and I would recommend we refer you to urology to further investigate this    [facility] [address]   [address] [place] [address]   Phone: [phone]   Fax: [phone]          Sincerely,  Angus Llanes MD

## 2024-07-03 NOTE — TELEPHONE ENCOUNTER
Patient called the clinic back and provider's message was relayed to him. He stated understanding and was given the number to call for urology. Patient had no further questions.    Annalise FOSTER RN  [facility] Triage Team

## 2024-07-11 ENCOUNTER — OFFICE VISIT (OUTPATIENT)
Dept: UROLOGY | Facility: CLINIC | Age: 74
End: 2024-07-11
Attending: INTERNAL MEDICINE
Payer: COMMERCIAL

## 2024-07-11 DIAGNOSIS — R97.20 ELEVATED PROSTATE SPECIFIC ANTIGEN (PSA): Primary | ICD-10-CM

## 2024-07-11 PROCEDURE — 99214 OFFICE O/P EST MOD 30 MIN: CPT | Performed by: UROLOGY

## 2024-07-11 NOTE — PROGRESS NOTES
[place]  CHIEF COMPLAINT   It was my pleasure to see Yoan Baltazar who is a 73 year old male for follow-up of Elevated PSA.      HPI   Yoan Baltazar is a very pleasant 73 year old male     Prior patient of Dr. Richards - 6/22/21:  History: Follow-up video consultation with this very pleasant 70-year-old gentleman.  He has a history of nocturia and mild slowing of the urine stream and did have a urinary tract infection in 2018 with a significant growth of E. coli.  More recently we have noticed some significant rise in the PSA i.e. the PSA velocity although the most recent PSA in February of this year was only 3.56.  Because of this we did a T3 MRI of the prostate and in addition to that the patient indicated his father did have prostate surgery about 4 years ago although he was not sure the nature of the surgery and did not know whether this was for cancer.    12/22/21:  Follow-up today to review his PSA  He has had no issues or changes in symptoms since June    Father had prostate removed - unclear the details if this was for cancer    TODAY 7/11/2024:  Follow-up today to review his recent PSA  He has been otherwise doing well with no changes or bothersome urinary symptoms  He denies any recent urinary tract infections  He denies any recent saddle sports and has been playing a lot of tennis    PHYSICAL EXAM  Patient is a 73 year old  male   Vitals: There were no vitals taken for this visit.  There is no height or weight on file to calculate BMI.  General Appearance Adult:   Alert, no acute distress, oriented  HENT: throat/mouth:normal, good dentition  Lungs: no respiratory distress, or pursed lip breathing  Heart: No obvious jugular venous distension present  Musculoskeltal: extremities normal, no peripheral edema  Neuro: Alert, oriented, speech and mentation normal  Psych: affect and mood normal  Gait: Normal       PSA PSA Screen Urologic Phys PSA Tumor Marker   Latest Ref Rng 0.00 - 4.00 ug/L 0.00 - 4.00 ng/mL  0.00 - 6.50 ng/mL   6/4/2002 0.71 (E)     3/19/2004 1.0 (E)     8/14/2006 0.95 (E)     1/22/2009 1.25 (E)     1/27/2010 1.29 (E)     5/2/2011 1.38 (E)     2/19/2019 3.46      2/26/2020 3.56      11/9/2020  2.90     3/15/2021 3.46      12/22/2021 3.60      7/20/2022 4.01 (H)      6/27/2023   3.30       3.46    7/2/2024   6.94 (H)      Creatinine   Date Value Ref Range Status   07/02/2024 1.26 (H) 0.67 - 1.17 mg/dL Final   05/19/2021 1.17 0.66 - 1.25 mg/dL Final     IMAGING:  All pertinent imaging reviewed:    All imaging studies reviewed by me.  I personally reviewed these imaging films.  A formal report from radiology will follow.    MRI PROSTATE 6/18/21:  FINDINGS:  Size: 3.6 x 4.2 x 4 cm  Volume: 31.5  Hemorrhage: Absent  Peripheral zone: Heterogeneous on T2-weighted images. Regions of  mildly decreased signal on ADC or DWI which are best characterized as  PI-RADS 2 without highly suspicious lesion.  Transition zone: Enlarged with BPH changes. Transition zone nodules  which are circumscribed or mostly encapsulated without diffusion  restriction.  PI-RADS 2.  No highly suspicious nodules.     Lesion(s) in rank order of severity (highest score- to lowest score,  then by size)      Lesion 1:  Location: Left base peripheral zone at the 4-6 o'clock position  relative to the urethra. Series 4 image 50.   Additional prostate regions involved: None   Size: 21 mm  T2 description: Wedge shaped heterogeneous hypointense  T2 numerical assessment: 2  DWI description: Wedge shaped mild hyperintense on DWI  DWI numerical assessment: 2  DCE assessment: Negative    Prostate margin: Capsular abutment>15 mm with smooth contour    Lesion overall PI-RADS category: 2     Neurovascular bundles: No neurovascular bundle involvement by  malignancy.   Seminal vesicles: No seminal vesicle involvement by malignancy.   Lymph nodes: No lymph node involvement   Bones: No suspicious lesions. Heterotopic ossification along the  superior posterior  aspect of the left greater trochanter.  Other pelvic organs: No additional findings.                                                               IMPRESSION:  1. Based on the most suspicious abnormality, this exam is  characterized as PIRADS 3 - The presence of clinically significant  cancer is equivocal. The most suspicious abnormality is located at the  4-6 o'clock of left prostate base. There is minimal capsular abutment  with no convincing evidence of extraprostatic extension. As also  stated on prior exam, it is possible that this could reflect focal  prostatitis.     2. No suspicious adenopathy or evidence of pelvic metastases.      ASSESSMENT and PLAN  73-year-old man with previously elevated PSA and questionable family history of prostate cancer    Elevated PSA  -We again discussed the use of PSA as a screening test for prostate cancer  - I reviewed his PSA history and trend  - His most recent PSA is elevated at 6.94 from 3.46 last year  - I reviewed his prior MRI from 2021 with a prostate volume of 31.5 and an equivocal PI-RADS 3 lesion  - We discussed the need for an updated MRI in order for this placed  - I reviewed his serum creatinine which is stable and normal and will allow for IV contrast dye  - This is a chronic problem with possible progression  - Virtual visit follow-up after the MRI to discuss the results      Time spent: 15 minutes spent on the date of the encounter doing chart review, history and exam, documentation and further activities as noted above.    Note copy attestation: the elements have been reviewed, edited as needed, and remain pertinent to today's visit.     Dwayne Tomlinson MD   Urology  [facility]  [facility] Phone: [phone]  [facility] Phone: [phone]

## 2024-07-11 NOTE — NURSING NOTE
Yoan Baltazar's goals for this visit include:   Chief Complaint   Patient presents with    New Patient     Elevated prostate specific antigen (PSA) 6.94       He requests these members of his care team be copied on today's visit information:       PCP: Angus Llanes    Referring Provider:  Angus Llanes MD  [address] [facility] [address]  [place] [address]    There were no vitals taken for this visit.    Do you need any medication refills at today's visit?     Melisa Power LPN on 7/11/2024 at 1:41 PM

## 2024-07-16 RX ORDER — LOSARTAN POTASSIUM 100 MG/1
100 TABLET ORAL DAILY
Qty: 90 TABLET | Refills: 3 | Status: SHIPPED | OUTPATIENT
Start: 2024-07-16

## 2024-07-24 ENCOUNTER — TELEPHONE (OUTPATIENT)
Dept: UROLOGY | Facility: CLINIC | Age: 74
End: 2024-07-24
Payer: COMMERCIAL

## 2024-07-24 NOTE — TELEPHONE ENCOUNTER
Patient scheduled for an upcoming appointment with  on 8/20/24 at 8:45 am.    Patient is to complete prostate MRI on 8/13/24 at 7:30 am. Order already in chart.    Pending encounter to make sure patient's MRI is completed prior to visit.     Nakia Asif MA on 7/24/2024 at 11:17 AM        Future Appointments 7/24/2024 - 1/20/2025        Date Visit Type Length Department Provider     8/13/2024  8:00 AM MR PROSTATE  WWO 45 min UU MRI UUMR2    Location Instructions:     [facility] [address] [place] [address]  Parking  parking is available on a limited basis during COVID. The  station is located at the main hospital entrance off [address]. Both  and self-parking are the same rates. Current parking options for our patients/visitors are at the Patient & Visitor Parking Ramp, located on [facility] and it is connected to the hospital via a tunnel. Reduced rates for parking in the ramps are in effect during Covid. Ticket validation is no longer needed to receive the reduced rate. Metered street parking is not available.  Entrance and check-in location Enter through the main hospital entrance off [address] or through the tunnel from the patient visitor ramp to the hospital level.&nbsp; You will be entering on Lobby Level which is 2nd floor.&nbsp; A security and information desk is located inside the entrance to provide you with a visitor pass and can direct you to your appointment location.  Check-in with the registration staff in the following imaging center areas:   GOLD WAITING AREA: Cardiac MRI, CT, Interventional radiology, Nuclear Medicine, Ultrasound and X-Ray. The Gold waiting room is located on the lobby entrance level (2nd floor) past the elevators.   PET/MRI WAITING AREA: PET scans, MRI scans that are non-cardiac. Located on the 1st floor. Take the elevator to the first floor, follow the signs to  PET/MRI  This appointment is in a hospital-based location.&nbsp; Before your visit, you may want to check with your insurance company for coverage and referral options, including cost differences between services provided in different clinic settings.&nbsp; For more information visit this link on the [employer] Website:&nbsp; [email or web address]              8/20/2024  8:45 AM RETURN PATIENT 15 min [facility] Dwayne Tomlinson MD

## 2024-08-13 ENCOUNTER — HOSPITAL ENCOUNTER (OUTPATIENT)
Dept: MRI IMAGING | Facility: CLINIC | Age: 74
Discharge: HOME | End: 2024-08-13
Attending: UROLOGY | Admitting: UROLOGY
Payer: COMMERCIAL

## 2024-08-13 DIAGNOSIS — R97.20 ELEVATED PROSTATE SPECIFIC ANTIGEN (PSA): ICD-10-CM

## 2024-08-13 PROCEDURE — 72197 MRI PELVIS W/O & W/DYE: CPT | Mod: 26 | Performed by: RADIOLOGY

## 2024-08-13 PROCEDURE — 255N000002 HC RX 255 OP 636: Performed by: UROLOGY

## 2024-08-13 PROCEDURE — 72197 MRI PELVIS W/O & W/DYE: CPT

## 2024-08-13 PROCEDURE — A9585 GADOBUTROL INJECTION: HCPCS | Performed by: UROLOGY

## 2024-08-13 RX ORDER — GADOBUTROL 604.72 MG/ML
10 INJECTION INTRAVENOUS ONCE
Status: COMPLETED | OUTPATIENT
Start: 2024-08-13 | End: 2024-08-13

## 2024-08-13 RX ADMIN — GADOBUTROL 9 ML: 604.72 INJECTION INTRAVENOUS at 09:03

## 2024-08-20 ENCOUNTER — MYC MEDICAL ADVICE (OUTPATIENT)
Dept: UROLOGY | Facility: CLINIC | Age: 74
End: 2024-08-20

## 2024-08-20 ENCOUNTER — VIRTUAL VISIT (OUTPATIENT)
Dept: UROLOGY | Facility: CLINIC | Age: 74
End: 2024-08-20
Payer: COMMERCIAL

## 2024-08-20 ENCOUNTER — TELEPHONE (OUTPATIENT)
Dept: UROLOGY | Facility: CLINIC | Age: 74
End: 2024-08-20

## 2024-08-20 DIAGNOSIS — R97.20 ELEVATED PROSTATE SPECIFIC ANTIGEN (PSA): Primary | ICD-10-CM

## 2024-08-20 PROCEDURE — 99214 OFFICE O/P EST MOD 30 MIN: CPT | Mod: 95 | Performed by: UROLOGY

## 2024-08-20 NOTE — PROGRESS NOTES
AMAYA  CHIEF COMPLAINT   It was my pleasure to see Yoan Baltazar who is a 73 year old male for follow-up of Elevated PSA.      HPI   Yoan Baltazar is a very pleasant 73 year old male     Prior patient of Dr. Richards - 6/22/21:  History: Follow-up video consultation with this very pleasant 70-year-old gentleman.  He has a history of nocturia and mild slowing of the urine stream and did have a urinary tract infection in 2018 with a significant growth of E. coli.  More recently we have noticed some significant rise in the PSA i.e. the PSA velocity although the most recent PSA in February of this year was only 3.56.  Because of this we did a T3 MRI of the prostate and in addition to that the patient indicated his father did have prostate surgery about 4 years ago although he was not sure the nature of the surgery and did not know whether this was for cancer.    12/22/21:  Follow-up today to review his PSA  He has had no issues or changes in symptoms since June    Father had prostate removed - unclear the details if this was for cancer    7/11/2024:  Follow-up today to review his recent PSA  He has been otherwise doing well with no changes or bothersome urinary symptoms  He denies any recent urinary tract infections  He denies any recent saddle sports and has been playing a lot of tennis    TODAY 8/20/2024:  Follow-up today to review his updated MRI    PHYSICAL EXAM  Patient is a 73 year old  male   Vitals: There were no vitals taken for this visit.  There is no height or weight on file to calculate BMI.  General Appearance Adult:   Alert, no acute distress, oriented  Neuro: Alert, oriented, speech and mentation normal  Psych: affect and mood normal          PSA PSA Screen Urologic Phys PSA Tumor Marker   Latest Ref Rng 0.00 - 4.00 ug/L 0.00 - 4.00 ng/mL 0.00 - 6.50 ng/mL   6/4/2002 0.71 (E)     3/19/2004 1.0 (E)     8/14/2006 0.95 (E)     1/22/2009 1.25 (E)     1/27/2010 1.29 (E)     5/2/2011 1.38 (E)     2/19/2019  3.46      2/26/2020 3.56      11/9/2020  2.90     3/15/2021 3.46      12/22/2021 3.60      7/20/2022 4.01 (H)      6/27/2023   3.30       3.46    7/2/2024   6.94 (H)      Creatinine   Date Value Ref Range Status   07/02/2024 1.26 (H) 0.67 - 1.17 mg/dL Final   05/19/2021 1.17 0.66 - 1.25 mg/dL Final     IMAGING:  All pertinent imaging reviewed:    All imaging studies reviewed by me.  I personally reviewed these imaging films.  A formal report from radiology will follow.    MRI PROSTATE 6/18/21:  FINDINGS:  Size: 3.6 x 4.2 x 4 cm  Volume: 31.5  Hemorrhage: Absent  Peripheral zone: Heterogeneous on T2-weighted images. Regions of  mildly decreased signal on ADC or DWI which are best characterized as  PI-RADS 2 without highly suspicious lesion.  Transition zone: Enlarged with BPH changes. Transition zone nodules  which are circumscribed or mostly encapsulated without diffusion  restriction.  PI-RADS 2.  No highly suspicious nodules.     Lesion(s) in rank order of severity (highest score- to lowest score,  then by size)      Lesion 1:  Location: Left base peripheral zone at the 4-6 o'clock position  relative to the urethra. Series 4 image 50.   Additional prostate regions involved: None   Size: 21 mm  T2 description: Wedge shaped heterogeneous hypointense  T2 numerical assessment: 2  DWI description: Wedge shaped mild hyperintense on DWI  DWI numerical assessment: 2  DCE assessment: Negative    Prostate margin: Capsular abutment>15 mm with smooth contour    Lesion overall PI-RADS category: 2     Neurovascular bundles: No neurovascular bundle involvement by  malignancy.   Seminal vesicles: No seminal vesicle involvement by malignancy.   Lymph nodes: No lymph node involvement   Bones: No suspicious lesions. Heterotopic ossification along the  superior posterior aspect of the left greater trochanter.  Other pelvic organs: No additional findings.                                                               IMPRESSION:  1.  Based on the most suspicious abnormality, this exam is  characterized as PIRADS 3 - The presence of clinically significant  cancer is equivocal. The most suspicious abnormality is located at the  4-6 o'clock of left prostate base. There is minimal capsular abutment  with no convincing evidence of extraprostatic extension. As also  stated on prior exam, it is possible that this could reflect focal  prostatitis.     2. No suspicious adenopathy or evidence of pelvic metastases.    MRI PROSTATE 8/13/2024:  FINDINGS: The diffusion-weighted and ADC sequences are nondiagnostic  in the majority of the peripheral zone secondary to artifact related  to rectal gas.     Size: 3.2 x 4.8 x 3.9 cm- 31 grams  Hemorrhage: Absent  Peripheral zone: Heterogeneous on T2-weighted images. Suspicious  lesions as detailed below.  Transition zone: Enlarged with BPH changes. Transition zone nodules  which are circumscribed or mostly encapsulated without diffusion  restriction.  PI-RADS 2.  No highly suspicious nodules.     Lesion(s) in rank order of severity (highest score- to lowest score,  then by size)      Lesion 1:  Focal 7mm T2 hypointensity in the right base peripheral zone(Series 7  image 11, 8/13) is favored to represent extruded BPH nodule since it  appears contiguous with the transition zone or coronal images.  Lesion overall PI-RADS category: 2     Lesion 2:  Previously described PIRADS 3 lesion in the left base peripheral zone  is less conspicuous and somewhat wedge-shaped on the current exam and  would be categorized as PIRADS-2     Neurovascular bundles: No neurovascular bundle involvement by  malignancy.  Seminal vesicles: No seminal vesicle involvement by malignancy.  Lymph nodes: No lymph node involvement  Bones: No suspicious lesions  Other pelvic organs: No additional findings.                                                                   IMPRESSION: The diffusion-weighted and ADC sequences are nondiagnostic  secondary  to artifact.  1. Based on the most suspicious abnormality, this exam is  characterized as PIRADS 2 - Clinically significant cancer is unlikely  to be present.    2. No suspicious adenopathy or evidence of pelvic metastases.      ASSESSMENT and PLAN  73-year-old man with previously elevated PSA and questionable family history of prostate cancer    Elevated PSA  -We again discussed the use of PSA as a screening test for prostate cancer  - I reviewed his PSA history and trend  - His most recent PSA is elevated at 6.94 from 3.46 last year  -I reviewed his updated MRI and reviewed these images personally.  I agree with radiologist interpretation.  We discussed that it is reassuring there are no PI-RADS 3 or greater lesions, and that the previous PI-RADS 3 lesions have been downgraded to PI-RADS 2.  His prostate has remained very stable size at 31 g  - We did discuss that this makes his PSA density concerning at greater than 0.2  - Will plan for PSA recheck and I will send results on [place]  - If his PSA is of a similar value or higher, we will then plan to proceed with a prostate biopsy.  If his PSA has dropped, the amount of drop will determine the timing of his next PSA  - TRUS/Bx - we discussed the risks and benefits of the prostate biopsy including the normal intermittent hematuria, blood per rectum, and blood with ejaculation as well as a 1-2% risk of post biopsy sepsis requiring hospitalization and IV antibiotics  - I will send a prescription for antibiotics after his PSA rechecked as a biopsy is warranted      Time spent: 14 minutes spent on the date of the encounter doing chart review, history and exam, documentation and further activities as noted above.    Note copy attestation: the elements have been reviewed, edited as needed, and remain pertinent to today's visit.     Dwayne Tomlinson MD   Urology  [facility]  [facility] Phone: [phone]  [facility]  [facility] Phone: [phone]

## 2024-08-20 NOTE — TELEPHONE ENCOUNTER
Pt did reach out at 8:56. VF messaged Dr. Tomlinson, but he had already moved on to next 9:00 appt. Dr. Tomlinson stated to VF that he could see this pt later today    LVM x 3, straight to , unable to reach pt.     Patient needs to be rescheduled for their virtual visit due to Reason for Reschedule: No-Show    Scheduling team, please refer to service line late cancellation/no-show policies and reach out to patient at a later date for rescheduling.    Appointment mode: Video  Provider: Dwayne Tomlinson MD

## 2024-08-20 NOTE — NURSING NOTE
Current patient location: [address] [place] [id number]    Is the patient currently in the state of [place]? YES    Visit mode:VIDEO    If the visit is dropped, the patient can be reconnected by: VIDEO VISIT: Text to cell phone:   Telephone Information:   Mobile [phone]       Will anyone else be joining the visit? NO  (If patient encounters technical issues they should call [phone] :[phone])    How would you like to obtain your AVS? MyChart    Are changes needed to the allergy or medication list? Yes pt has medications listed that he is not longer using and requested removal     Are refills needed on medications prescribed by this physician? NO    Rooming Documentation:  Not applicable      Reason for visit: RECHECK    Michaela ESPARZA

## 2024-08-20 NOTE — TELEPHONE ENCOUNTER
8/20 Left generic voicemail for patient to return call to clinic at [phone], also informed patient on the voicemail that a [employer] message will be sent as well.     When patient returns call to clinic can reschedule appointment to 1:45pm today for a virtual visit with Dr.Hinck Nakia Asif MA on 8/20/2024 at 9:38 AM

## 2024-08-21 ENCOUNTER — TELEPHONE (OUTPATIENT)
Dept: UROLOGY | Facility: CLINIC | Age: 74
End: 2024-08-21
Payer: COMMERCIAL

## 2024-08-21 NOTE — TELEPHONE ENCOUNTER
Left Voicemail (1st Attempt) for the patient to call back and schedule the following:    Appointment type: Lab  Provider: Dr. Tomlinson  Return date: Next available  Specialty phone number: [phone]  Additonal Notes: PSA lab     [facility]   Dermatology, Surgery, Urology  [facility]

## 2024-09-04 ENCOUNTER — LAB (OUTPATIENT)
Dept: LAB | Facility: CLINIC | Age: 74
End: 2024-09-04
Payer: COMMERCIAL

## 2024-09-04 DIAGNOSIS — R97.20 ELEVATED PROSTATE SPECIFIC ANTIGEN (PSA): ICD-10-CM

## 2024-09-04 LAB — PSA SERPL DL<=0.01 NG/ML-MCNC: 3.13 NG/ML (ref 0–6.5)

## 2024-09-04 PROCEDURE — 84153 ASSAY OF PSA TOTAL: CPT

## 2024-09-04 PROCEDURE — 36415 COLL VENOUS BLD VENIPUNCTURE: CPT

## 2024-09-24 DIAGNOSIS — E78.5 HYPERLIPIDEMIA LDL GOAL <130: ICD-10-CM

## 2024-09-25 ENCOUNTER — MYC REFILL (OUTPATIENT)
Dept: FAMILY MEDICINE | Facility: CLINIC | Age: 74
End: 2024-09-25
Payer: COMMERCIAL

## 2024-09-25 DIAGNOSIS — E78.5 HYPERLIPIDEMIA LDL GOAL <130: ICD-10-CM

## 2024-09-25 RX ORDER — SIMVASTATIN 40 MG
TABLET ORAL
Qty: 90 TABLET | Refills: 2 | Status: SHIPPED | OUTPATIENT
Start: 2024-09-25

## 2024-09-25 RX ORDER — SIMVASTATIN 40 MG
TABLET ORAL
Qty: 90 TABLET | Refills: 3 | OUTPATIENT
Start: 2024-09-25

## 2024-09-25 NOTE — TELEPHONE ENCOUNTER
Medication passed protocol, however, refill RN could not approve because of the medication warning/interaction. Provider, please approve or deny the prescription.

## 2024-11-11 ENCOUNTER — TRANSFERRED RECORDS (OUTPATIENT)
Dept: HEALTH INFORMATION MANAGEMENT | Facility: CLINIC | Age: 74
End: 2024-11-11
Payer: COMMERCIAL

## 2024-11-12 ENCOUNTER — TRANSFERRED RECORDS (OUTPATIENT)
Dept: HEALTH INFORMATION MANAGEMENT | Facility: CLINIC | Age: 74
End: 2024-11-12

## 2025-01-10 ENCOUNTER — TRANSFERRED RECORDS (OUTPATIENT)
Dept: HEALTH INFORMATION MANAGEMENT | Facility: CLINIC | Age: 75
End: 2025-01-10
Payer: COMMERCIAL

## 2025-01-12 ENCOUNTER — MYC MEDICAL ADVICE (OUTPATIENT)
Dept: FAMILY MEDICINE | Facility: CLINIC | Age: 75
End: 2025-01-12
Payer: COMMERCIAL

## 2025-01-12 DIAGNOSIS — I10 BENIGN ESSENTIAL HYPERTENSION: Primary | ICD-10-CM

## 2025-01-14 ENCOUNTER — LAB (OUTPATIENT)
Dept: LAB | Facility: CLINIC | Age: 75
End: 2025-01-14
Payer: COMMERCIAL

## 2025-01-14 DIAGNOSIS — I10 BENIGN ESSENTIAL HYPERTENSION: ICD-10-CM

## 2025-01-14 PROCEDURE — 36415 COLL VENOUS BLD VENIPUNCTURE: CPT

## 2025-01-14 PROCEDURE — 84403 ASSAY OF TOTAL TESTOSTERONE: CPT

## 2025-01-15 LAB — TESTOST SERPL-MCNC: 668 NG/DL (ref 240–950)

## 2025-01-16 NOTE — RESULT ENCOUNTER NOTE
Conor Milton,    I have had the opportunity to review your recent results and an interpretation is as follows:  Your follow up testosterone levels did return stable and within normal limits      Sincerely,  Angus Llanes MD VM to pt with request to contact office.    C/s for 1/24/25 placed in recall.

## 2025-01-27 ENCOUNTER — TRANSFERRED RECORDS (OUTPATIENT)
Dept: HEALTH INFORMATION MANAGEMENT | Facility: CLINIC | Age: 75
End: 2025-01-27
Payer: COMMERCIAL

## 2025-03-19 DIAGNOSIS — I10 ESSENTIAL HYPERTENSION WITH GOAL BLOOD PRESSURE LESS THAN 140/90: ICD-10-CM

## 2025-03-19 RX ORDER — AMLODIPINE BESYLATE 10 MG/1
TABLET ORAL
Qty: 90 TABLET | Refills: 3 | Status: SHIPPED | OUTPATIENT
Start: 2025-03-19

## 2025-03-23 ENCOUNTER — HEALTH MAINTENANCE LETTER (OUTPATIENT)
Age: 75
End: 2025-03-23

## 2025-04-28 ENCOUNTER — ALLIED HEALTH/NURSE VISIT (OUTPATIENT)
Dept: FAMILY MEDICINE | Facility: CLINIC | Age: 75
End: 2025-04-28
Payer: COMMERCIAL

## 2025-04-28 DIAGNOSIS — Z23 ENCOUNTER FOR IMMUNIZATION: Primary | ICD-10-CM

## 2025-04-28 PROCEDURE — 99207 PR NO CHARGE NURSE ONLY: CPT

## 2025-04-28 PROCEDURE — 90480 ADMN SARSCOV2 VAC 1/ONLY CMP: CPT

## 2025-04-28 PROCEDURE — 91320 SARSCV2 VAC 30MCG TRS-SUC IM: CPT

## 2025-04-28 NOTE — PROGRESS NOTES
Prior to immunization administration, verified patients identity using patient s name and date of birth. Please see Immunization Activity for additional information.     Is the patient's temperature normal (100.5 or less)? Yes     Patient MEETS CRITERIA. PROCEED with vaccine administration.      Patient instructed to remain in clinic for 15 minutes afterwards, and to report any adverse reactions.      Link to Ancillary Visit Immunization Standing Orders SmartSet     Screening performed by Jose Santos CMA on 4/28/2025 at 11:51 AM.

## 2025-04-29 DIAGNOSIS — I10 ESSENTIAL HYPERTENSION WITH GOAL BLOOD PRESSURE LESS THAN 140/90: ICD-10-CM

## 2025-04-29 RX ORDER — TRIAMTERENE AND HYDROCHLOROTHIAZIDE 37.5; 25 MG/1; MG/1
CAPSULE ORAL
Qty: 90 CAPSULE | Refills: 3 | Status: SHIPPED | OUTPATIENT
Start: 2025-04-29

## 2025-06-25 DIAGNOSIS — I10 BENIGN ESSENTIAL HYPERTENSION: ICD-10-CM

## 2025-06-25 DIAGNOSIS — E78.5 HYPERLIPIDEMIA LDL GOAL <130: ICD-10-CM

## 2025-06-26 RX ORDER — SIMVASTATIN 40 MG
40 TABLET ORAL AT BEDTIME
Qty: 30 TABLET | Refills: 0 | Status: SHIPPED | OUTPATIENT
Start: 2025-06-26

## 2025-06-26 RX ORDER — LOSARTAN POTASSIUM 100 MG/1
TABLET ORAL
Qty: 90 TABLET | Refills: 3 | Status: SHIPPED | OUTPATIENT
Start: 2025-06-26

## 2025-06-27 SDOH — HEALTH STABILITY: PHYSICAL HEALTH: ON AVERAGE, HOW MANY DAYS PER WEEK DO YOU ENGAGE IN MODERATE TO STRENUOUS EXERCISE (LIKE A BRISK WALK)?: 5 DAYS

## 2025-06-27 SDOH — HEALTH STABILITY: PHYSICAL HEALTH: ON AVERAGE, HOW MANY MINUTES DO YOU ENGAGE IN EXERCISE AT THIS LEVEL?: 120 MIN

## 2025-06-27 ASSESSMENT — SOCIAL DETERMINANTS OF HEALTH (SDOH): HOW OFTEN DO YOU GET TOGETHER WITH FRIENDS OR RELATIVES?: ONCE A WEEK

## 2025-07-02 ENCOUNTER — OFFICE VISIT (OUTPATIENT)
Dept: FAMILY MEDICINE | Facility: CLINIC | Age: 75
End: 2025-07-02
Payer: COMMERCIAL

## 2025-07-02 VITALS
DIASTOLIC BLOOD PRESSURE: 82 MMHG | WEIGHT: 188.3 LBS | SYSTOLIC BLOOD PRESSURE: 161 MMHG | BODY MASS INDEX: 29.55 KG/M2 | TEMPERATURE: 97.1 F | RESPIRATION RATE: 17 BRPM | OXYGEN SATURATION: 97 % | HEIGHT: 67 IN | HEART RATE: 67 BPM

## 2025-07-02 DIAGNOSIS — Z00.00 ROUTINE GENERAL MEDICAL EXAMINATION AT A HEALTH CARE FACILITY: Primary | ICD-10-CM

## 2025-07-02 DIAGNOSIS — R97.20 ELEVATED PROSTATE SPECIFIC ANTIGEN (PSA): ICD-10-CM

## 2025-07-02 DIAGNOSIS — Z13.6 SCREENING FOR CARDIOVASCULAR CONDITION: ICD-10-CM

## 2025-07-02 DIAGNOSIS — N52.9 ERECTILE DYSFUNCTION, UNSPECIFIED ERECTILE DYSFUNCTION TYPE: ICD-10-CM

## 2025-07-02 DIAGNOSIS — E78.5 HYPERLIPIDEMIA LDL GOAL <130: ICD-10-CM

## 2025-07-02 DIAGNOSIS — R41.3 MEMORY LOSS: ICD-10-CM

## 2025-07-02 DIAGNOSIS — E66.3 OVERWEIGHT (BMI 25.0-29.9): ICD-10-CM

## 2025-07-02 DIAGNOSIS — R73.01 IFG (IMPAIRED FASTING GLUCOSE): ICD-10-CM

## 2025-07-02 DIAGNOSIS — K22.70 BARRETT'S ESOPHAGUS WITHOUT DYSPLASIA: ICD-10-CM

## 2025-07-02 DIAGNOSIS — N18.31 STAGE 3A CHRONIC KIDNEY DISEASE (H): ICD-10-CM

## 2025-07-02 DIAGNOSIS — I10 ESSENTIAL HYPERTENSION WITH GOAL BLOOD PRESSURE LESS THAN 140/90: ICD-10-CM

## 2025-07-02 DIAGNOSIS — Z12.5 SCREENING FOR PROSTATE CANCER: ICD-10-CM

## 2025-07-02 DIAGNOSIS — I10 BENIGN ESSENTIAL HYPERTENSION: ICD-10-CM

## 2025-07-02 LAB
ERYTHROCYTE [DISTWIDTH] IN BLOOD BY AUTOMATED COUNT: 13 % (ref 10–15)
EST. AVERAGE GLUCOSE BLD GHB EST-MCNC: 123 MG/DL
HBA1C MFR BLD: 5.9 % (ref 0–5.6)
HCT VFR BLD AUTO: 46.6 % (ref 40–53)
HGB BLD-MCNC: 15.7 G/DL (ref 13.3–17.7)
MCH RBC QN AUTO: 29.3 PG (ref 26.5–33)
MCHC RBC AUTO-ENTMCNC: 33.7 G/DL (ref 31.5–36.5)
MCV RBC AUTO: 87 FL (ref 78–100)
PLATELET # BLD AUTO: 218 10E3/UL (ref 150–450)
RBC # BLD AUTO: 5.35 10E6/UL (ref 4.4–5.9)
WBC # BLD AUTO: 5.5 10E3/UL (ref 4–11)

## 2025-07-02 PROCEDURE — 36415 COLL VENOUS BLD VENIPUNCTURE: CPT | Performed by: INTERNAL MEDICINE

## 2025-07-02 PROCEDURE — 83036 HEMOGLOBIN GLYCOSYLATED A1C: CPT | Performed by: INTERNAL MEDICINE

## 2025-07-02 PROCEDURE — 99214 OFFICE O/P EST MOD 30 MIN: CPT | Mod: 25 | Performed by: INTERNAL MEDICINE

## 2025-07-02 PROCEDURE — G0103 PSA SCREENING: HCPCS | Performed by: INTERNAL MEDICINE

## 2025-07-02 PROCEDURE — 3044F HG A1C LEVEL LT 7.0%: CPT | Performed by: INTERNAL MEDICINE

## 2025-07-02 PROCEDURE — 80061 LIPID PANEL: CPT | Performed by: INTERNAL MEDICINE

## 2025-07-02 PROCEDURE — 1126F AMNT PAIN NOTED NONE PRSNT: CPT | Performed by: INTERNAL MEDICINE

## 2025-07-02 PROCEDURE — 85027 COMPLETE CBC AUTOMATED: CPT | Performed by: INTERNAL MEDICINE

## 2025-07-02 PROCEDURE — G0439 PPPS, SUBSEQ VISIT: HCPCS | Performed by: INTERNAL MEDICINE

## 2025-07-02 PROCEDURE — 3079F DIAST BP 80-89 MM HG: CPT | Performed by: INTERNAL MEDICINE

## 2025-07-02 PROCEDURE — 3048F LDL-C <100 MG/DL: CPT | Performed by: INTERNAL MEDICINE

## 2025-07-02 PROCEDURE — 80053 COMPREHEN METABOLIC PANEL: CPT | Performed by: INTERNAL MEDICINE

## 2025-07-02 PROCEDURE — 82043 UR ALBUMIN QUANTITATIVE: CPT | Performed by: INTERNAL MEDICINE

## 2025-07-02 PROCEDURE — 3077F SYST BP >= 140 MM HG: CPT | Performed by: INTERNAL MEDICINE

## 2025-07-02 PROCEDURE — 82570 ASSAY OF URINE CREATININE: CPT | Performed by: INTERNAL MEDICINE

## 2025-07-02 RX ORDER — TRIAMTERENE AND HYDROCHLOROTHIAZIDE 37.5; 25 MG/1; MG/1
1 CAPSULE ORAL DAILY
Qty: 90 CAPSULE | Refills: 3 | Status: SHIPPED | OUTPATIENT
Start: 2025-07-02

## 2025-07-02 RX ORDER — SIMVASTATIN 40 MG
40 TABLET ORAL AT BEDTIME
Qty: 30 TABLET | Refills: 0 | Status: SHIPPED | OUTPATIENT
Start: 2025-07-02

## 2025-07-02 RX ORDER — SILDENAFIL 100 MG/1
TABLET, FILM COATED ORAL
Qty: 30 TABLET | Refills: 11 | Status: SHIPPED | OUTPATIENT
Start: 2025-07-02

## 2025-07-02 RX ORDER — AMLODIPINE BESYLATE 10 MG/1
10 TABLET ORAL DAILY
Qty: 90 TABLET | Refills: 3 | Status: SHIPPED | OUTPATIENT
Start: 2025-07-02

## 2025-07-02 RX ORDER — TADALAFIL 20 MG/1
20 TABLET ORAL DAILY PRN
Qty: 30 TABLET | Refills: 11 | Status: SHIPPED | OUTPATIENT
Start: 2025-07-02

## 2025-07-02 RX ORDER — LOSARTAN POTASSIUM 100 MG/1
100 TABLET ORAL DAILY
Qty: 90 TABLET | Refills: 3 | Status: SHIPPED | OUTPATIENT
Start: 2025-07-02

## 2025-07-02 ASSESSMENT — PAIN SCALES - GENERAL: PAINLEVEL_OUTOF10: NO PAIN (0)

## 2025-07-02 NOTE — PATIENT INSTRUCTIONS
(Z00.00) Routine general medical examination at a health care facility  (primary encounter diagnosis)  Comment: For routine exam, we will draw labs as ordered, cholesterol, diabetes mellitus check, liver function, renal function, PSA. We will also update vaccination history.  Plan:     (N18.31) Stage 3a chronic kidney disease (H)  Comment: Check labs today and continue renal diet   Plan: COMPREHENSIVE METABOLIC PANEL, Albumin Random         Urine Quantitative with Creat Ratio, CBC with         Platelets            (Z13.6) Screening for cardiovascular condition  Comment: check fasting lipid panel today   Plan:     (E78.5) Hyperlipidemia LDL goal <130  Comment: as above - continue simvastatin   Plan: Lipid panel reflex to direct LDL Non-fasting,         simvastatin (ZOCOR) 40 MG tablet            (E66.3) Overweight (BMI 25.0-29.9)  Comment: Continue weight trend   Plan:     (R97.20) Elevated prostate specific antigen (PSA)  Comment: Will check PSA   Plan: Prostate Specific Antigen Screen            (Z12.5) Screening for prostate cancer  Comment: PSA  Plan: Prostate Specific Antigen Screen            (R41.3) Memory loss  Comment: Referral to neuropsychology   Plan: Adult Neuropsychology  Referral            (K22.70) Boogie's esophagus without dysplasia  Comment: Plan to upper endoscopy   Plan:     (R73.01) IFG (impaired fasting glucose)  Comment: check hemoglobin A1c   Plan: Hemoglobin A1c            (I10) Essential hypertension with goal blood pressure less than 140/90  Comment: We will continue amlodipine and triamterene hydrochlorothiazide and follow up in 2 weeks via Mychart   Plan: amLODIPine (NORVASC) 10 MG tablet,         triamterene-HCTZ (DYAZIDE) 37.5-25 MG capsule            (I10) Benign essential hypertension  Comment:   Plan: losartan (COZAAR) 100 MG tablet            (N52.9) Erectile dysfunction, unspecified erectile dysfunction type  Comment: OK for use of tadalafil and sildenafil.  Note not to  take medications at the same time and will refer to urology to discuss other options  [facility] [place]   [address] [facility] [address]   [facility] [address]   Phone: [phone]   Fax: [phone]     Plan: tadalafil (CIALIS) 20 MG tablet, sildenafil         (VIAGRA) 100 MG tablet

## 2025-07-02 NOTE — PROGRESS NOTES
Preventive Care Visit  [facility] TANYA Llanes MD, MD, Internal Medicine  Jul 2, 2025  {Provider  Link to SmartSet :[id number]}    Santana Milton is a 74 year old, presenting for the following:  Physical        7/2/2025     9:27 AM   Additional Questions   Roomed by Arthur/Lilian   Accompanied by Self          HPI       Advance Care Planning  {The storyboard will display whether the patient has ACP docs on file. Hover over the Code section in the storyboard to access the ACP documents. :219236}          6/27/2025   General Health   How would you rate your overall physical health? Excellent   Feel stress (tense, anxious, or unable to sleep) Not at all         6/27/2025   Nutrition   Diet: Regular (no restrictions)         6/27/2025   Exercise   Days per week of moderate/strenous exercise 5 days   Average minutes spent exercising at this level 120 min         6/27/2025   Social Factors   Frequency of gathering with friends or relatives Once a week   Worry food won't last until get money to buy more No   Food not last or not have enough money for food? No   Do you have housing? (Housing is defined as stable permanent housing and does not include staying outside in a car, in a tent, in an abandoned building, in an overnight shelter, or couch-surfing.) Yes   Are you worried about losing your housing? No   Lack of transportation? No   Unable to get utilities (heat,electricity)? No         6/27/2025   Fall Risk   Fallen 2 or more times in the past year? No    No   Trouble with walking or balance? No    No       Multiple values from one day are sorted in reverse-chronological order          6/27/2025   Activities of Daily Living- Home Safety   Needs help with the following daily activites None of the above   Safety concerns in the home None of the above         6/27/2025   Dental   Dentist two times every year? Yes         6/27/2025   Hearing Screening   Hearing concerns? None of the above          6/27/2025   Driving Risk Screening   Patient/family members have concerns about driving No         6/27/2025   General Alertness/Fatigue Screening   Have you been more tired than usual lately? No         6/27/2025   Urinary Incontinence Screening   Bothered by leaking urine in past 6 months No         Today's PHQ-2 Score:       7/2/2025     9:19 AM   PHQ-2 ( 1999 [employer])   Q1: Little interest or pleasure in doing things 0   Q2: Feeling down, depressed or hopeless 0   PHQ-2 Score 0    Q1: Little interest or pleasure in doing things Not at all   Q2: Feeling down, depressed or hopeless Not at all   PHQ-2 Score 0       Patient-reported           6/27/2025   Substance Use   Alcohol more than 3/day or more than 7/wk No   Do you have a current opioid prescription? No   How severe/bad is pain from 1 to 10? 0/10 (No Pain)   Do you use any other substances recreationally? No     Social History     Tobacco Use     Smoking status: Never     Smokeless tobacco: Never   Vaping Use     Vaping status: Never Used   Substance Use Topics     Alcohol use: Not Currently     Comment: maybe 1 glass of wine a month     Drug use: No     {Provider  If there are gaps in the social history shown above, please follow the link to update and then refresh the note Link to Social and Substance History :[id number]}      6/27/2025   AAA Screening   Family history of Abdominal Aortic Aneurysm (AAA)? No   ASCVD Risk   The ASCVD Risk score (Rina RIVERA, et al., 2019) failed to calculate for the following reasons:    The valid total cholesterol range is 130 to 320 mg/dL        {Provider  REQUIRED FOR AWV Use the storyboard to review patient history, after sections have been marked as reviewed, refresh note to capture documentation:137760}  {Provider   REQUIRED AWV use this link to review and update sexual activity history  after section has been marked as reviewed, refresh note to capture documentation:216704}  Reviewed and updated as needed  this visit by Provider                    Past Medical History:   Diagnosis Date     Arthritis      Glucose intolerance (pre-diabetes)      HTN (hypertension)      Hyperlipidaemia      Overweight (BMI 25.0-29.9)      Seasonal allergies     s/p allergy shots     Surgical complication 3/6/15    Complications in back of right knee     Current providers sharing in care for this patient include:  Patient Care Team:  Angus Llanes MD as PCP - General (Internal Medicine)  Yoan Oates MD as MD (Orthopedics)  Angus Llanes MD as Assigned PCP  Dwayne Tomlinson MD as MD (Urology)  Dwayne Tomlinson MD as Assigned Surgical Provider    The following health maintenance items are reviewed in Epic and correct as of today:  Health Maintenance   Topic Date Due     BMP  01/02/2025     LIPID  01/02/2025     MEDICARE ANNUAL WELLNESS VISIT  07/02/2025     CMP  07/02/2025     MICROALBUMIN  07/02/2025     ANNUAL REVIEW OF HM ORDERS  07/02/2025     CBC  07/02/2025     HEMOGLOBIN  07/02/2025     INFLUENZA VACCINE (1) 09/01/2025     FALL RISK ASSESSMENT  07/02/2026     RSV VACCINE (1 - 1-dose 75+ series) 12/03/2026     DIABETES SCREENING  07/02/2027     COLORECTAL CANCER SCREENING  12/13/2028     ADVANCE CARE PLANNING  07/02/2029     DTAP/TDAP/TD VACCINE (4 - Td or Tdap) 07/11/2034     HEPATITIS C SCREENING  Completed     PHQ-2 (once per calendar year)  Completed     PNEUMOCOCCAL VACCINE 50+ YEARS  Completed     URINALYSIS  Completed     ZOSTER VACCINE  Completed     COVID-19 VACCINE  Completed     HPV VACCINE  Aged Out     MENINGITIS VACCINE  Aged Out     Right shoulder rotator cuff sprain   Yoan ALEC Baltazar has had some difficulty with right shoulder and right wrist tendonitis.  Stage 3a chronic kidney disease (H)   Following low sodium and renal diet and staying hydrated  Hyperlipidemia LDL goal <130   Continues on statin     Memory loss   Wife has noticed some mild memory concerns.  Difficulty recalling events from 2  "weeks ago, but not interfering with day to day activity  Boogie's esophagus without dysplasia   Continues on daily proton pump inhibitor and planning for upper endoscopy this year  IFG (impaired fasting glucose)    Weight has been stable.     Review of Systems  Constitutional, HEENT, cardiovascular, pulmonary, GI, , musculoskeletal, neuro, skin, endocrine and psych systems are negative, except as otherwise noted.     Objective    Exam  BP (!) 161/82 (BP Location: Right arm, Patient Position: Sitting, Cuff Size: Adult Regular)   Pulse 67   Temp 97.1  F (36.2  C) (Temporal)   Resp 17   Ht 1.71 m (5' 7.32\")   Wt 85.4 kg (188 lb 4.8 oz)   SpO2 97%   BMI 29.21 kg/m     Estimated body mass index is 29.21 kg/m  as calculated from the following:    Height as of this encounter: 1.71 m (5' 7.32\").    Weight as of this encounter: 85.4 kg (188 lb 4.8 oz).    Physical Exam  GENERAL: alert and no distress  EYES: Eyes grossly normal to inspection, PERRL and conjunctivae and sclerae normal  HENT: ear canals and TM's normal, nose and mouth without ulcers or lesions  NECK: no adenopathy, no asymmetry, masses, or scars  RESP: lungs clear to auscultation - no rales, rhonchi or wheezes  CV: regular rate and rhythm, normal S1 S2, no S3 or S4, no murmur, click or rub, no peripheral edema  ABDOMEN: soft, nontender, no hepatosplenomegaly, no masses and bowel sounds normal  MS: no gross musculoskeletal defects noted, no edema  SKIN: no suspicious lesions or rashes  NEURO: Normal strength and tone, mentation intact and speech normal  PSYCH: mentation appears normal, affect normal/bright      {Provider  The Mini-Cog is incomplete, use link to complete and refresh note Link to Mini-Cog :588281}      7/2/2024   Mini Cog   Clock Draw Score 2 Normal   3 Item Recall 3 objects recalled   Mini Cog Total Score 5     {A Mini-Cog total score of 0-2 suggests the possibility of dementia, score of 3-5 suggests no dementia:727483}    Patient " Instructions   (Z00.00) Routine general medical examination at a health care facility  (primary encounter diagnosis)  Comment: For routine exam, we will draw labs as ordered, cholesterol, diabetes mellitus check, liver function, renal function, PSA. We will also update vaccination history.  Plan:     (N18.31) Stage 3a chronic kidney disease (H)  Comment: Check labs today and continue renal diet   Plan: COMPREHENSIVE METABOLIC PANEL, Albumin Random         Urine Quantitative with Creat Ratio, CBC with         Platelets            (Z13.6) Screening for cardiovascular condition  Comment: check fasting lipid panel today   Plan:     (E78.5) Hyperlipidemia LDL goal <130  Comment: as above - continue simvastatin   Plan: Lipid panel reflex to direct LDL Non-fasting,         simvastatin (ZOCOR) 40 MG tablet            (E66.3) Overweight (BMI 25.0-29.9)  Comment: Continue weight trend   Plan:     (R97.20) Elevated prostate specific antigen (PSA)  Comment: Will check PSA   Plan: Prostate Specific Antigen Screen            (Z12.5) Screening for prostate cancer  Comment: PSA  Plan: Prostate Specific Antigen Screen            (R41.3) Memory loss  Comment: Referral to neuropsychology   Plan: Adult Neuropsychology  Referral            (K22.70) Boogie's esophagus without dysplasia  Comment: Plan to upper endoscopy   Plan:     (R73.01) IFG (impaired fasting glucose)  Comment: check hemoglobin A1c   Plan: Hemoglobin A1c            (I10) Essential hypertension with goal blood pressure less than 140/90  Comment: We will continue amlodipine and triamterene hydrochlorothiazide and follow up in 2 weeks via Mychart   Plan: amLODIPine (NORVASC) 10 MG tablet,         triamterene-HCTZ (DYAZIDE) 37.5-25 MG capsule            (I10) Benign essential hypertension  Comment:   Plan: losartan (COZAAR) 100 MG tablet            (N52.9) Erectile dysfunction, unspecified erectile dysfunction type  Comment: OK for use of tadalafil and  sildenafil.  Note not to take medications at the same time and will refer to urology to discuss other options  [facility] [address]   [address]   [facility] [address]   Phone: [phone]   Fax: [phone]     Plan: tadalafil (CIALIS) 20 MG tablet, sildenafil         (VIAGRA) 100 MG tablet                   Signed Electronically by: Angus Llanes MD, MD  {Email feedback regarding this note to [email or web address]   :[id number]}

## 2025-07-03 ENCOUNTER — RESULTS FOLLOW-UP (OUTPATIENT)
Dept: FAMILY MEDICINE | Facility: CLINIC | Age: 75
End: 2025-07-03

## 2025-07-03 DIAGNOSIS — N18.31 STAGE 3A CHRONIC KIDNEY DISEASE (H): Primary | ICD-10-CM

## 2025-07-03 LAB
ALBUMIN SERPL BCG-MCNC: 4.6 G/DL (ref 3.5–5.2)
ALP SERPL-CCNC: 82 U/L (ref 40–150)
ALT SERPL W P-5'-P-CCNC: 25 U/L (ref 0–70)
ANION GAP SERPL CALCULATED.3IONS-SCNC: 9 MMOL/L (ref 7–15)
AST SERPL W P-5'-P-CCNC: 31 U/L (ref 0–45)
BILIRUB SERPL-MCNC: 0.5 MG/DL
BUN SERPL-MCNC: 22 MG/DL (ref 8–23)
CALCIUM SERPL-MCNC: 10 MG/DL (ref 8.8–10.4)
CHLORIDE SERPL-SCNC: 105 MMOL/L (ref 98–107)
CHOLEST SERPL-MCNC: 150 MG/DL
CREAT SERPL-MCNC: 1.38 MG/DL (ref 0.67–1.17)
CREAT UR-MCNC: 302 MG/DL
EGFRCR SERPLBLD CKD-EPI 2021: 54 ML/MIN/1.73M2
FASTING STATUS PATIENT QL REPORTED: NO
FASTING STATUS PATIENT QL REPORTED: NO
GLUCOSE SERPL-MCNC: 113 MG/DL (ref 70–99)
HCO3 SERPL-SCNC: 28 MMOL/L (ref 22–29)
HDLC SERPL-MCNC: 38 MG/DL
LDLC SERPL CALC-MCNC: 94 MG/DL
MICROALBUMIN UR-MCNC: <12 MG/L
MICROALBUMIN/CREAT UR: NORMAL MG/G{CREAT}
NONHDLC SERPL-MCNC: 112 MG/DL
POTASSIUM SERPL-SCNC: 4.1 MMOL/L (ref 3.4–5.3)
PROT SERPL-MCNC: 7.3 G/DL (ref 6.4–8.3)
PSA SERPL DL<=0.01 NG/ML-MCNC: 4.21 NG/ML (ref 0–6.5)
SODIUM SERPL-SCNC: 142 MMOL/L (ref 135–145)
TRIGL SERPL-MCNC: 88 MG/DL

## 2025-08-05 DIAGNOSIS — E78.5 HYPERLIPIDEMIA LDL GOAL <130: ICD-10-CM

## 2025-08-05 RX ORDER — SIMVASTATIN 40 MG
40 TABLET ORAL AT BEDTIME
Qty: 90 TABLET | Refills: 3 | Status: SHIPPED | OUTPATIENT
Start: 2025-08-05